# Patient Record
Sex: FEMALE | Race: WHITE | URBAN - METROPOLITAN AREA
[De-identification: names, ages, dates, MRNs, and addresses within clinical notes are randomized per-mention and may not be internally consistent; named-entity substitution may affect disease eponyms.]

---

## 2017-01-16 ENCOUNTER — OP HISTORICAL/CONVERTED ENCOUNTER (OUTPATIENT)
Dept: OTHER | Age: 82
End: 2017-01-16

## 2017-02-13 ENCOUNTER — OP HISTORICAL/CONVERTED ENCOUNTER (OUTPATIENT)
Dept: OTHER | Age: 82
End: 2017-02-13

## 2017-02-20 ENCOUNTER — OP HISTORICAL/CONVERTED ENCOUNTER (OUTPATIENT)
Dept: OTHER | Age: 82
End: 2017-02-20

## 2018-03-14 ENCOUNTER — OP HISTORICAL/CONVERTED ENCOUNTER (OUTPATIENT)
Dept: OTHER | Age: 83
End: 2018-03-14

## 2019-06-18 ENCOUNTER — ED HISTORICAL/CONVERTED ENCOUNTER (OUTPATIENT)
Dept: OTHER | Age: 84
End: 2019-06-18

## 2019-08-07 ENCOUNTER — OP HISTORICAL/CONVERTED ENCOUNTER (OUTPATIENT)
Dept: OTHER | Age: 84
End: 2019-08-07

## 2019-08-22 ENCOUNTER — OP HISTORICAL/CONVERTED ENCOUNTER (OUTPATIENT)
Dept: OTHER | Age: 84
End: 2019-08-22

## 2019-12-11 ENCOUNTER — OP HISTORICAL/CONVERTED ENCOUNTER (OUTPATIENT)
Dept: OTHER | Age: 84
End: 2019-12-11

## 2020-02-14 ENCOUNTER — OP HISTORICAL/CONVERTED ENCOUNTER (OUTPATIENT)
Dept: OTHER | Age: 85
End: 2020-02-14

## 2022-06-08 LAB
CREATININE, EXTERNAL: 1.29
HBA1C MFR BLD HPLC: 6.1 %

## 2022-11-04 ENCOUNTER — TELEPHONE (OUTPATIENT)
Dept: INTERNAL MEDICINE CLINIC | Age: 87
End: 2022-11-04

## 2022-12-01 ENCOUNTER — TELEPHONE (OUTPATIENT)
Dept: INTERNAL MEDICINE CLINIC | Age: 87
End: 2022-12-01

## 2022-12-01 DIAGNOSIS — R82.90 ABNORMAL URINALYSIS: ICD-10-CM

## 2022-12-01 DIAGNOSIS — R73.01 ELEVATED FASTING BLOOD SUGAR: ICD-10-CM

## 2022-12-01 DIAGNOSIS — E78.00 HYPERCHOLESTEREMIA: Primary | ICD-10-CM

## 2022-12-01 NOTE — TELEPHONE ENCOUNTER
12/1/22 called pt lmvm cell to call back to r/s 12/12/22 2pm appt due to provider out of office. Also called home number s/w son-in-law he will have his wife call to r/s.

## 2022-12-02 RX ORDER — VERAPAMIL HYDROCHLORIDE 120 MG/1
120 TABLET, FILM COATED ORAL 2 TIMES DAILY
Qty: 180 TABLET | Refills: 1 | Status: SHIPPED | OUTPATIENT
Start: 2022-12-02

## 2022-12-02 RX ORDER — VERAPAMIL HYDROCHLORIDE 120 MG/1
120 TABLET, FILM COATED ORAL 2 TIMES DAILY
COMMUNITY
End: 2022-12-02 | Stop reason: SDUPTHER

## 2022-12-28 PROBLEM — R73.01 ELEVATED FASTING BLOOD SUGAR: Status: ACTIVE | Noted: 2022-12-28

## 2022-12-28 PROBLEM — I10 BENIGN ESSENTIAL HTN: Status: ACTIVE | Noted: 2022-12-28

## 2022-12-28 PROBLEM — J44.9 COPD, MODERATE (HCC): Status: ACTIVE | Noted: 2022-12-28

## 2022-12-28 PROBLEM — E83.52 HYPERCALCEMIA: Status: ACTIVE | Noted: 2022-12-28

## 2022-12-28 PROBLEM — F32.A DEPRESSION, CONTROLLED: Status: ACTIVE | Noted: 2022-12-28

## 2022-12-28 PROBLEM — H91.93 BILATERAL HEARING LOSS, UNSPECIFIED HEARING LOSS TYPE: Status: ACTIVE | Noted: 2022-12-28

## 2022-12-28 PROBLEM — R79.89 ELEVATED SERUM CREATININE: Status: ACTIVE | Noted: 2022-12-28

## 2022-12-28 PROBLEM — E78.00 HYPERCHOLESTEREMIA: Status: ACTIVE | Noted: 2022-12-28

## 2023-01-03 ENCOUNTER — OFFICE VISIT (OUTPATIENT)
Dept: INTERNAL MEDICINE CLINIC | Age: 88
End: 2023-01-03
Payer: MEDICARE

## 2023-01-03 VITALS
HEIGHT: 61 IN | DIASTOLIC BLOOD PRESSURE: 60 MMHG | BODY MASS INDEX: 29.07 KG/M2 | OXYGEN SATURATION: 95 % | TEMPERATURE: 97 F | SYSTOLIC BLOOD PRESSURE: 114 MMHG | WEIGHT: 154 LBS | HEART RATE: 68 BPM | RESPIRATION RATE: 18 BRPM

## 2023-01-03 DIAGNOSIS — R73.01 ELEVATED FASTING BLOOD SUGAR: ICD-10-CM

## 2023-01-03 DIAGNOSIS — I10 BENIGN ESSENTIAL HTN: Primary | ICD-10-CM

## 2023-01-03 DIAGNOSIS — R20.0 NUMBNESS OF RIGHT HAND: ICD-10-CM

## 2023-01-03 DIAGNOSIS — F32.A DEPRESSION, CONTROLLED: ICD-10-CM

## 2023-01-03 DIAGNOSIS — R79.89 ELEVATED SERUM CREATININE: ICD-10-CM

## 2023-01-03 DIAGNOSIS — E78.00 HYPERCHOLESTEREMIA: ICD-10-CM

## 2023-01-03 DIAGNOSIS — J44.9 COPD, MODERATE (HCC): ICD-10-CM

## 2023-01-03 PROBLEM — N18.30 CHRONIC RENAL DISEASE, STAGE III (HCC): Status: ACTIVE | Noted: 2023-01-03

## 2023-01-03 PROCEDURE — 1123F ACP DISCUSS/DSCN MKR DOCD: CPT | Performed by: INTERNAL MEDICINE

## 2023-01-03 PROCEDURE — 99214 OFFICE O/P EST MOD 30 MIN: CPT | Performed by: INTERNAL MEDICINE

## 2023-01-03 RX ORDER — GABAPENTIN 100 MG/1
100 CAPSULE ORAL
Qty: 30 CAPSULE | Refills: 1 | Status: SHIPPED | OUTPATIENT
Start: 2023-01-03

## 2023-01-03 RX ORDER — SIMVASTATIN 10 MG/1
10 TABLET, FILM COATED ORAL
Qty: 90 TABLET | Refills: 1 | Status: SHIPPED | OUTPATIENT
Start: 2023-01-03

## 2023-01-03 RX ORDER — GABAPENTIN 100 MG/1
100 CAPSULE ORAL 3 TIMES DAILY
Qty: 30 CAPSULE | Refills: 1 | Status: SHIPPED | OUTPATIENT
Start: 2023-01-03 | End: 2023-01-03 | Stop reason: ALTCHOICE

## 2023-01-03 NOTE — PROGRESS NOTES
1. Have you been to the ER, urgent care clinic since your last visit? Hospitalized since your last visit? No    2. Have you seen or consulted any other health care providers outside of the [address] [place] [address] since your last visit? Include any pap smears or colon screening. No    [address] [facility]  [address]  [place], [address]  Phone: [phone]      Arlinda Mcburney (: 1932) is a 80 y.o. female, established patient, here for evaluation of the following chief complaint(s):  Follow-up, Hypertension, COPD, and Cholesterol Problem         SUBJECTIVE/OBJECTIVE:  HPI:  Pt states that she is here for a follow up on HTN, COPD, and Cholesterol. She has an appointment in 2023 with her pulmonologist, Dr Denton Client for a follow up. She has an appointment to see her eye doctor in May 23. Pt denies any pain at this time. She needs a refill on Simvastatin. Patient is with her daughter David Jeffrey and she stated mom got better from [facility] with the medications in 2022. Cindi informed mom takes Linzess  2 times a week and it helps with her constipation. Stated she has some numbness in her right hand and has to shake her hand very often. Prior to Admission medications    Medication Sig Start Date End Date Taking? Authorizing Provider   gabapentin (NEURONTIN) 100 mg capsule Take 1 Capsule by mouth nightly. Max Daily Amount: 100 mg. 1/3/23  Yes Selena Crain MD   simvastatin (ZOCOR) 10 mg tablet Take 1 Tablet by mouth nightly. 1/3/23  Yes Selena Crain MD   albuterol-ipratropium (DUO-NEB) 2.5 mg-0.5 mg/3 ml nebu 3 mL by Nebulization route every six (6) hours as needed for Wheezing. Yes Provider, Historical   clotrimazole-betamethasone (LOTRISONE) topical cream Apply 1 Each to affected area as needed for Skin Irritation. Yes Provider, Historical   mirtazapine (REMERON) 7.5 mg tablet Take 7.5 mg by mouth nightly.    Yes Provider, Historical linaCLOtide (LINZESS) 72 mcg cap capsule Take 72 mcg by mouth daily as needed. Yes Provider, Historical   albuterol (PROVENTIL HFA, VENTOLIN HFA, PROAIR HFA) 90 mcg/actuation inhaler Take 2 Puffs by inhalation as needed for Wheezing. Yes Provider, Historical   aspirin delayed-release 81 mg tablet Take  by mouth daily. Yes Provider, Historical   cyanocobalamin 1,000 mcg tablet Take 1,000 mcg by mouth daily. Yes Provider, Historical   vit C/E/Zn/coppr/lutein/zeaxan (PRESERVISION AREDS-2 PO) Take 1 Tablet by mouth daily. Yes Provider, Historical   cranberry 500 mg capsule Take 500 mg by mouth daily. Yes Provider, Historical   TURMERIC PO Take 500 mg by mouth daily. Yes Provider, Historical   fluticasone-umeclidinium-vilanterol (Trelegy Ellipta) 100-62.5-25 mcg inhaler Take 1 Puff by inhalation daily. Yes Provider, Historical   verapamiL (CALAN) 120 mg tablet Take 1 Tablet by mouth two (2) times a day. Indications: high blood pressure 12/2/22  Yes Brenda Rosado MD        Allergies   Allergen Reactions    Penicillins Unknown (comments)        Past Medical History:   Diagnosis Date    Benign essential HTN     Bilateral hearing loss, unspecified hearing loss type     COPD, moderate (HCC)     Depression, controlled     Elevated fasting blood sugar     Elevated serum creatinine     Hypercalcemia     Hypercholesteremia         Family History   Problem Relation Age of Onset    Stroke Mother     Heart Failure Mother     Heart Failure Father         Past Surgical History:   Procedure Laterality Date    HX CHOLECYSTECTOMY  03/2016    Dr. Lorie Lion TUBAL LIGATION         Review of Systems  Constitutional:  Negative for chills and fever. HENT:  Negative for congestion, ear pain, nosebleeds, sinus pain, sore throat and tinnitus. Eyes:  Negative for redness. Respiratory:  Negative for cough and shortness of breath. Cardiovascular:  Negative for chest pain and palpitations.    Gastrointestinal: Negative for abdominal pain, diarrhea, nausea and vomiting. Endocrine: Negative for cold intolerance and polyuria. Genitourinary:  Negative for dysuria and hematuria. Musculoskeletal:  Negative for back pain and neck pain. Skin:  Negative for rash. Neurological:  Negative for dizziness and headaches. Numbness in her right hand  Psychiatric/Behavioral: Negative. /60 (BP 1 Location: Left upper arm, BP Patient Position: Sitting, BP Cuff Size: Adult)   Pulse 68   Temp 97 °F (36.1 °C) (Temporal)   Resp 18   Ht 5' 1\" (1.549 m)   Wt 154 lb (69.9 kg)   SpO2 95%   BMI 29.10 kg/m²      Physical Exam  Constitutional:       Appearance: Elderly patient with her daughter Brenda Vega. appearance. HENT:      Head: Normocephalic and atraumatic. Right Ear: External ear normal.  Is hard of hearing     Left Ear: External ear normal.      Nose: Nose normal.      Mouth/Throat:      Mouth: Mucous membranes are moist.   Eyes:      Extraocular Movements: Extraocular movements intact. Pupils: Pupils are equal, round, and reactive to light. Cardiovascular:      Rate and Rhythm: Normal rate and regular rhythm. Pulmonary:      Effort: Pulmonary effort is normal.      Breath sounds: Normal breath sounds. Abdominal:      Palpations: Abdomen is soft. Tenderness: There is no abdominal tenderness. Musculoskeletal:      Cervical back: Normal range of motion and neck supple. Right lower leg: No edema. Left lower leg: No edema. Tinel's negative, no weakness of the hand muscles  Skin:     General: Skin is warm and dry. Neurological:      General: No focal deficit present. Mental Status: She is alert and oriented to person, place, and time. Psychiatric:         Mood and Affect: Mood normal.    ASSESSMENT/PLAN:  Below is the assessment and plan developed based on review of pertinent history, physical exam, labs, studies, and medications.     1. Benign essential HTN  Comments:  Blood pressure is well controlled, advised to continue low-sodium diet and verapamil  Orders:  -     METABOLIC PANEL, COMPREHENSIVE; Future  2. Elevated fasting blood sugar  Comments:  Glucose 104, A1c 5.9, sodium 143, potassium 4.7, LFTs normal.  Advised to continue low-carb diet  Orders:  -     HEMOGLOBIN A1C WITH EAG; Future  3. Elevated serum creatinine  Comments:  Tendon is slightly high at 1.27 from previous 1.29 in June 2022, advised to continue fluid intake and to avoid Aleve, naproxen, Advil. 4. COPD, moderate ([address]  Comments:  COPD stable, advised to continue Trelegy,albuterol inhaler and nebulizer and to keep follow-up with   5. Hypercholesteremia  Comments:  Cholesterol is controlled with a total cholesterol of 181, HDL of 80, LDL of 83. Advised to continue low-fat diet and simvastatin. Orders:  -     METABOLIC PANEL, COMPREHENSIVE; Future  -     LIPID PANEL; Future  -     TSH 3RD GENERATION; Future  6. Depression, controlled  Comments:  Stable, advised to continue Remeron. 7. Numbness of right hand  Comments:  Most probably carpal tunnel, will try gabapentin, offered Ortho consult patient prefers to wait  Orders:  -     gabapentin (NEURONTIN) 100 mg capsule; Take 1 Capsule by mouth nightly. Max Daily Amount: 100 mg., Normal, Disp-30 Capsule, R-1    Return in about 6 months (around 7/3/2023), or if symptoms worsen or fail to improve. There are no Patient Instructions on file for this visit. Health Maintenance Due   Topic Date Due    Depression Monitoring  Never done    DTaP/Tdap/Td series (1 - Tdap) Never done    Shingles Vaccine (1 of 2) Never done    Bone Densitometry (Dexa) Screening  Never done    COVID-19 Vaccine (3 - Booster for Moderna series) 05/10/2021    Flu Vaccine (1) 08/01/2022    Medicare Yearly Exam  Never done        Aspects of this note may have been generated using voice recognition software. Despite editing, there may be unrecognized errors.     An electronic signature was used to authenticate this note.   -- Juanpablo Carvalho MD

## 2023-05-26 RX ORDER — MIRTAZAPINE 7.5 MG/1
7.5 TABLET, FILM COATED ORAL NIGHTLY
COMMUNITY

## 2023-05-26 RX ORDER — ALBUTEROL SULFATE 90 UG/1
2 AEROSOL, METERED RESPIRATORY (INHALATION) PRN
COMMUNITY

## 2023-05-26 RX ORDER — SIMVASTATIN 10 MG
1 TABLET ORAL NIGHTLY
COMMUNITY
Start: 2023-01-03 | End: 2023-07-14 | Stop reason: SDUPTHER

## 2023-05-26 RX ORDER — VERAPAMIL HYDROCHLORIDE 120 MG/1
120 TABLET, FILM COATED ORAL 2 TIMES DAILY
COMMUNITY
Start: 2022-12-02 | End: 2023-06-04

## 2023-05-26 RX ORDER — GABAPENTIN 100 MG/1
100 CAPSULE ORAL DAILY PRN
COMMUNITY
Start: 2023-01-03

## 2023-05-26 RX ORDER — ASPIRIN 81 MG/1
TABLET ORAL DAILY
COMMUNITY

## 2023-05-26 RX ORDER — FLUTICASONE FUROATE, UMECLIDINIUM BROMIDE AND VILANTEROL TRIFENATATE 100; 62.5; 25 UG/1; UG/1; UG/1
1 POWDER RESPIRATORY (INHALATION) DAILY
COMMUNITY

## 2023-05-26 RX ORDER — IPRATROPIUM BROMIDE AND ALBUTEROL SULFATE 2.5; .5 MG/3ML; MG/3ML
3 SOLUTION RESPIRATORY (INHALATION) EVERY 6 HOURS PRN
COMMUNITY

## 2023-05-26 RX ORDER — CLOTRIMAZOLE AND BETAMETHASONE DIPROPIONATE 10; .64 MG/G; MG/G
1 CREAM TOPICAL PRN
COMMUNITY

## 2023-06-04 RX ORDER — VERAPAMIL HYDROCHLORIDE 120 MG/1
TABLET, FILM COATED ORAL
Qty: 60 TABLET | Refills: 0 | Status: SHIPPED | OUTPATIENT
Start: 2023-06-04

## 2023-06-26 LAB
CREATININE, EXTERNAL: 1.29
HBA1C MFR BLD HPLC: 6.4 %
LDL CHOLESTEROL, EXTERNAL: 75
TOTAL CHOLESTEROL, EXTERNAL: 159

## 2023-06-27 LAB
ALBUMIN SERPL-MCNC: 4 G/DL (ref 3.5–4.6)
ALBUMIN/GLOB SERPL: 1.7 {RATIO} (ref 1.2–2.2)
ALP SERPL-CCNC: 94 IU/L (ref 44–121)
ALT SERPL-CCNC: 12 IU/L (ref 0–32)
AST SERPL-CCNC: 15 IU/L (ref 0–40)
BILIRUB SERPL-MCNC: 0.4 MG/DL (ref 0–1.2)
BUN SERPL-MCNC: 22 MG/DL (ref 10–36)
BUN/CREAT SERPL: 17 (ref 12–28)
CALCIUM SERPL-MCNC: 10.3 MG/DL (ref 8.7–10.3)
CHLORIDE SERPL-SCNC: 102 MMOL/L (ref 96–106)
CHOLEST SERPL-MCNC: 159 MG/DL (ref 100–199)
CO2 SERPL-SCNC: 25 MMOL/L (ref 20–29)
CREAT SERPL-MCNC: 1.29 MG/DL (ref 0.57–1)
EGFRCR SERPLBLD CKD-EPI 2021: 39 ML/MIN/1.73
EST. AVERAGE GLUCOSE BLD GHB EST-MCNC: 137 MG/DL
GLOBULIN SER CALC-MCNC: 2.3 G/DL (ref 1.5–4.5)
GLUCOSE SERPL-MCNC: 93 MG/DL (ref 70–99)
HBA1C MFR BLD: 6.4 % (ref 4.8–5.6)
HDLC SERPL-MCNC: 64 MG/DL
LDLC SERPL CALC-MCNC: 75 MG/DL (ref 0–99)
POTASSIUM SERPL-SCNC: 4.8 MMOL/L (ref 3.5–5.2)
PROT SERPL-MCNC: 6.3 G/DL (ref 6–8.5)
SODIUM SERPL-SCNC: 142 MMOL/L (ref 134–144)
TRIGL SERPL-MCNC: 114 MG/DL (ref 0–149)
TSH SERPL DL<=0.005 MIU/L-ACNC: 3.06 UIU/ML (ref 0.45–4.5)
VLDLC SERPL CALC-MCNC: 20 MG/DL (ref 5–40)

## 2023-07-05 ENCOUNTER — OFFICE VISIT (OUTPATIENT)
Facility: CLINIC | Age: 88
End: 2023-07-05
Payer: MEDICARE

## 2023-07-05 VITALS
DIASTOLIC BLOOD PRESSURE: 70 MMHG | HEIGHT: 62 IN | WEIGHT: 148.8 LBS | BODY MASS INDEX: 27.38 KG/M2 | SYSTOLIC BLOOD PRESSURE: 156 MMHG | TEMPERATURE: 97.8 F | HEART RATE: 97 BPM

## 2023-07-05 DIAGNOSIS — E78.00 HYPERCHOLESTEROLEMIA: ICD-10-CM

## 2023-07-05 DIAGNOSIS — R73.01 ELEVATED FASTING BLOOD SUGAR: ICD-10-CM

## 2023-07-05 DIAGNOSIS — J44.9 COPD, MODERATE (HCC): ICD-10-CM

## 2023-07-05 DIAGNOSIS — I10 ESSENTIAL HYPERTENSION, BENIGN: Primary | ICD-10-CM

## 2023-07-05 DIAGNOSIS — N18.32 STAGE 3B CHRONIC KIDNEY DISEASE (HCC): ICD-10-CM

## 2023-07-05 PROCEDURE — 1123F ACP DISCUSS/DSCN MKR DOCD: CPT | Performed by: INTERNAL MEDICINE

## 2023-07-05 PROCEDURE — 3023F SPIROM DOC REV: CPT | Performed by: INTERNAL MEDICINE

## 2023-07-05 PROCEDURE — 99214 OFFICE O/P EST MOD 30 MIN: CPT | Performed by: INTERNAL MEDICINE

## 2023-07-05 PROCEDURE — G8419 CALC BMI OUT NRM PARAM NOF/U: HCPCS | Performed by: INTERNAL MEDICINE

## 2023-07-05 PROCEDURE — 1090F PRES/ABSN URINE INCON ASSESS: CPT | Performed by: INTERNAL MEDICINE

## 2023-07-05 PROCEDURE — 1036F TOBACCO NON-USER: CPT | Performed by: INTERNAL MEDICINE

## 2023-07-05 PROCEDURE — G8427 DOCREV CUR MEDS BY ELIG CLIN: HCPCS | Performed by: INTERNAL MEDICINE

## 2023-07-05 RX ORDER — ZOSTER VACCINE RECOMBINANT, ADJUVANTED 50 MCG/0.5
0.5 KIT INTRAMUSCULAR SEE ADMIN INSTRUCTIONS
Qty: 0.5 ML | Refills: 0 | Status: SHIPPED | OUTPATIENT
Start: 2023-07-05 | End: 2024-01-01

## 2023-07-05 SDOH — ECONOMIC STABILITY: FOOD INSECURITY: WITHIN THE PAST 12 MONTHS, YOU WORRIED THAT YOUR FOOD WOULD RUN OUT BEFORE YOU GOT MONEY TO BUY MORE.: NEVER TRUE

## 2023-07-05 SDOH — ECONOMIC STABILITY: INCOME INSECURITY: HOW HARD IS IT FOR YOU TO PAY FOR THE VERY BASICS LIKE FOOD, HOUSING, MEDICAL CARE, AND HEATING?: NOT HARD AT ALL

## 2023-07-05 SDOH — ECONOMIC STABILITY: HOUSING INSECURITY
IN THE LAST 12 MONTHS, WAS THERE A TIME WHEN YOU DID NOT HAVE A STEADY PLACE TO SLEEP OR SLEPT IN A SHELTER (INCLUDING NOW)?: NO

## 2023-07-05 SDOH — ECONOMIC STABILITY: FOOD INSECURITY: WITHIN THE PAST 12 MONTHS, THE FOOD YOU BOUGHT JUST DIDN'T LAST AND YOU DIDN'T HAVE MONEY TO GET MORE.: NEVER TRUE

## 2023-07-05 NOTE — PROGRESS NOTES
[address] [facility]  [address] [facility], [address] [facility]  Phone: [phone]      Kandace Colmenares (: 1932) is a 80 y.o. female, established patient, here for evaluation of the following chief complaint(s):  Follow-up, Hypertension, and Discuss Labs      SUBJECTIVE/OBJECTIVE:  HPI:  Patient is being seen today for a 6 month follow up and she recently had lab work done in . Glu: 93, BUN: 22, Creatin: 1.29, Sod: 142, Potassium: 4.8, Alk: 94, AST:15, ALT: 12, Chol, total: 159, HDL: 64, HDL: 64, LDL: 75, Hemo A1C: 6.4. She is here today with her daughter Em Ospina. Patient has  history of hypertension and COPD. She states that she does check BP at home occasionally and does not have any high BP readings. Patient states that she gets a little light headed sometimes but it does not last long. She states that overall she feels okay and appetite is low and worries about weight. She states that her energy level is low. Has tingling and numbness in her hands at times,especially after she uses her Tablet for a while. Patient does not need any refills at this time.   Informed mom had follow-up with  and advised to continue current medications  Current Outpatient Medications   Medication Sig    zoster recombinant adjuvanted vaccine (SHINGRIX) 50 MCG/0.5ML SUSR injection Inject 0.5 mLs into the muscle See Admin Instructions 1 dose now and repeat in 2-6 months    verapamil (CALAN) 120 MG tablet TAKE 1 TABLET BY MOUTH TWICE DAILY FOR HIGH BLOOD PRESSURE    TURMERIC PO Take 500 mg by mouth daily    albuterol sulfate HFA (PROVENTIL;VENTOLIN;PROAIR) 108 (90 Base) MCG/ACT inhaler Inhale 2 puffs into the lungs as needed    aspirin 81 MG EC tablet Take by mouth daily    clotrimazole-betamethasone (LOTRISONE) 1-0.05 % cream Apply 1 each topically as needed    cyanocobalamin 1000 MCG tablet Take 1 tablet by mouth daily    fluticasone-umeclidin-vilant (TRELEGY ELLIPTA)

## 2023-07-05 NOTE — PROGRESS NOTES
1. \"Have you been to the ER, urgent care clinic since your last visit? Hospitalized since your last visit? \" No    2. \"Have you seen or consulted any other health care providers outside of the [address] [place] [address] since your last visit? \" No     3. For patients aged 43-73: Has the patient had a colonoscopy / FIT/ Cologuard? NA - based on age      If the patient is female:    4. For patients aged 43-66: Has the patient had a mammogram within the past 2 years? NA - based on age or sex      11. For patients aged 21-65: Has the patient had a pap smear?  NA - based on age or sex

## 2023-07-14 RX ORDER — VERAPAMIL HYDROCHLORIDE 120 MG/1
TABLET, FILM COATED ORAL
Qty: 180 TABLET | Refills: 1 | Status: SHIPPED | OUTPATIENT
Start: 2023-07-14

## 2023-07-14 RX ORDER — SIMVASTATIN 10 MG
10 TABLET ORAL NIGHTLY
Qty: 90 TABLET | Refills: 1 | Status: SHIPPED | OUTPATIENT
Start: 2023-07-14

## 2023-09-16 RX ORDER — IPRATROPIUM BROMIDE AND ALBUTEROL SULFATE 2.5; .5 MG/3ML; MG/3ML
SOLUTION RESPIRATORY (INHALATION)
Qty: 180 ML | Refills: 0 | Status: SHIPPED | OUTPATIENT
Start: 2023-09-16

## 2023-10-09 ENCOUNTER — APPOINTMENT (OUTPATIENT)
Facility: HOSPITAL | Age: 88
End: 2023-10-09
Payer: MEDICARE

## 2023-10-09 ENCOUNTER — TELEPHONE (OUTPATIENT)
Facility: CLINIC | Age: 88
End: 2023-10-09

## 2023-10-09 ENCOUNTER — HOSPITAL ENCOUNTER (EMERGENCY)
Facility: HOSPITAL | Age: 88
Discharge: HOME | End: 2023-10-09
Attending: EMERGENCY MEDICINE
Payer: MEDICARE

## 2023-10-09 VITALS
TEMPERATURE: 97.6 F | DIASTOLIC BLOOD PRESSURE: 76 MMHG | HEART RATE: 76 BPM | BODY MASS INDEX: 26.5 KG/M2 | RESPIRATION RATE: 24 BRPM | HEIGHT: 62 IN | SYSTOLIC BLOOD PRESSURE: 193 MMHG | WEIGHT: 144 LBS | OXYGEN SATURATION: 95 %

## 2023-10-09 DIAGNOSIS — J44.1 COPD EXACERBATION (HCC): Primary | ICD-10-CM

## 2023-10-09 LAB
ALBUMIN SERPL-MCNC: 3.1 G/DL (ref 3.5–5)
ALBUMIN/GLOB SERPL: 0.8 (ref 1.1–2.2)
ALP SERPL-CCNC: 108 U/L (ref 45–117)
ALT SERPL-CCNC: 17 U/L (ref 12–78)
ANION GAP SERPL CALC-SCNC: 7 MMOL/L (ref 5–15)
AST SERPL W P-5'-P-CCNC: 16 U/L (ref 15–37)
BASOPHILS # BLD: 0 K/UL (ref 0–0.1)
BASOPHILS NFR BLD: 0 % (ref 0–1)
BILIRUB SERPL-MCNC: 0.3 MG/DL (ref 0.2–1)
BNP SERPL-MCNC: 534 PG/ML
BUN SERPL-MCNC: 27 MG/DL (ref 6–20)
BUN/CREAT SERPL: 20 (ref 12–20)
CA-I BLD-MCNC: 9.7 MG/DL (ref 8.5–10.1)
CHLORIDE SERPL-SCNC: 102 MMOL/L (ref 97–108)
CO2 SERPL-SCNC: 30 MMOL/L (ref 21–32)
CREAT SERPL-MCNC: 1.36 MG/DL (ref 0.55–1.02)
DIFFERENTIAL METHOD BLD: ABNORMAL
EOSINOPHIL # BLD: 0.3 K/UL (ref 0–0.4)
EOSINOPHIL NFR BLD: 3 % (ref 0–7)
ERYTHROCYTE [DISTWIDTH] IN BLOOD BY AUTOMATED COUNT: 13 % (ref 11.5–14.5)
GLOBULIN SER CALC-MCNC: 3.9 G/DL (ref 2–4)
GLUCOSE SERPL-MCNC: 117 MG/DL (ref 65–100)
HCT VFR BLD AUTO: 33.9 % (ref 35–47)
HGB BLD-MCNC: 10.6 G/DL (ref 11.5–16)
IMM GRANULOCYTES # BLD AUTO: 0 K/UL (ref 0–0.04)
IMM GRANULOCYTES NFR BLD AUTO: 0 % (ref 0–0.5)
LYMPHOCYTES # BLD: 1.4 K/UL (ref 0.8–3.5)
LYMPHOCYTES NFR BLD: 12 % (ref 12–49)
MCH RBC QN AUTO: 29.4 PG (ref 26–34)
MCHC RBC AUTO-ENTMCNC: 31.3 G/DL (ref 30–36.5)
MCV RBC AUTO: 93.9 FL (ref 80–99)
MONOCYTES # BLD: 1 K/UL (ref 0–1)
MONOCYTES NFR BLD: 9 % (ref 5–13)
NEUTS SEG # BLD: 9 K/UL (ref 1.8–8)
NEUTS SEG NFR BLD: 76 % (ref 32–75)
PLATELET # BLD AUTO: 270 K/UL (ref 150–400)
PMV BLD AUTO: 10.9 FL (ref 8.9–12.9)
POTASSIUM SERPL-SCNC: 4.7 MMOL/L (ref 3.5–5.1)
PROT SERPL-MCNC: 7 G/DL (ref 6.4–8.2)
RBC # BLD AUTO: 3.61 M/UL (ref 3.8–5.2)
SODIUM SERPL-SCNC: 139 MMOL/L (ref 136–145)
TROPONIN I SERPL HS-MCNC: 10 NG/L (ref 0–51)
WBC # BLD AUTO: 11.7 K/UL (ref 3.6–11)

## 2023-10-09 PROCEDURE — 96366 THER/PROPH/DIAG IV INF ADDON: CPT

## 2023-10-09 PROCEDURE — 93005 ELECTROCARDIOGRAM TRACING: CPT | Performed by: EMERGENCY MEDICINE

## 2023-10-09 PROCEDURE — 71045 X-RAY EXAM CHEST 1 VIEW: CPT

## 2023-10-09 PROCEDURE — 80053 COMPREHEN METABOLIC PANEL: CPT

## 2023-10-09 PROCEDURE — 96365 THER/PROPH/DIAG IV INF INIT: CPT

## 2023-10-09 PROCEDURE — 2580000003 HC RX 258: Performed by: EMERGENCY MEDICINE

## 2023-10-09 PROCEDURE — 84484 ASSAY OF TROPONIN QUANT: CPT

## 2023-10-09 PROCEDURE — 85025 COMPLETE CBC W/AUTO DIFF WBC: CPT

## 2023-10-09 PROCEDURE — 6360000002 HC RX W HCPCS: Performed by: EMERGENCY MEDICINE

## 2023-10-09 PROCEDURE — 6360000002 HC RX W HCPCS

## 2023-10-09 PROCEDURE — 99285 EMERGENCY DEPT VISIT HI MDM: CPT

## 2023-10-09 PROCEDURE — 96375 TX/PRO/DX INJ NEW DRUG ADDON: CPT

## 2023-10-09 PROCEDURE — 83880 ASSAY OF NATRIURETIC PEPTIDE: CPT

## 2023-10-09 PROCEDURE — 6370000000 HC RX 637 (ALT 250 FOR IP): Performed by: EMERGENCY MEDICINE

## 2023-10-09 RX ORDER — IPRATROPIUM BROMIDE AND ALBUTEROL SULFATE 2.5; .5 MG/3ML; MG/3ML
1 SOLUTION RESPIRATORY (INHALATION)
Status: COMPLETED | OUTPATIENT
Start: 2023-10-09 | End: 2023-10-09

## 2023-10-09 RX ORDER — IPRATROPIUM BROMIDE AND ALBUTEROL SULFATE 2.5; .5 MG/3ML; MG/3ML
1 SOLUTION RESPIRATORY (INHALATION) EVERY 6 HOURS PRN
Qty: 60 ML | Refills: 0 | Status: SHIPPED | OUTPATIENT
Start: 2023-10-09 | End: 2023-10-14

## 2023-10-09 RX ORDER — AZITHROMYCIN 250 MG/1
TABLET, FILM COATED ORAL
Qty: 1 PACKET | Refills: 0 | Status: SHIPPED | OUTPATIENT
Start: 2023-10-09

## 2023-10-09 RX ORDER — PREDNISONE 50 MG/1
50 TABLET ORAL DAILY
Qty: 5 TABLET | Refills: 0 | Status: SHIPPED | OUTPATIENT
Start: 2023-10-09 | End: 2023-10-14

## 2023-10-09 RX ADMIN — IPRATROPIUM BROMIDE AND ALBUTEROL SULFATE 1 DOSE: 2.5; .5 SOLUTION RESPIRATORY (INHALATION) at 18:05

## 2023-10-09 RX ADMIN — IPRATROPIUM BROMIDE AND ALBUTEROL SULFATE 1 DOSE: .5; 2.5 SOLUTION RESPIRATORY (INHALATION) at 17:11

## 2023-10-09 RX ADMIN — IPRATROPIUM BROMIDE AND ALBUTEROL SULFATE 1 DOSE: 2.5; .5 SOLUTION RESPIRATORY (INHALATION) at 16:47

## 2023-10-09 RX ADMIN — AZITHROMYCIN MONOHYDRATE 500 MG: 500 INJECTION, POWDER, LYOPHILIZED, FOR SOLUTION INTRAVENOUS at 18:07

## 2023-10-09 RX ADMIN — METHYLPREDNISOLONE SODIUM SUCCINATE 125 MG: 125 INJECTION, POWDER, FOR SOLUTION INTRAMUSCULAR; INTRAVENOUS at 16:52

## 2023-10-09 ASSESSMENT — PAIN - FUNCTIONAL ASSESSMENT: PAIN_FUNCTIONAL_ASSESSMENT: 0-10

## 2023-10-09 ASSESSMENT — PAIN SCALES - GENERAL: PAINLEVEL_OUTOF10: 4

## 2023-10-09 ASSESSMENT — PAIN DESCRIPTION - LOCATION: LOCATION: GENERALIZED

## 2023-10-09 NOTE — DISCHARGE INSTRUCTIONS
Thank you! Thank you for allowing me to care for you in the emergency department. It is my goal to provide you with excellent care. If you have not received excellent quality care, please ask to speak to the nurse manager. Please fill out the survey that will come to you by mail or email since we listen to your feedback! Below you will find a list of your tests from today's visit. Should you have any questions, please do not hesitate to call the emergency department.     Labs  Recent Results (from the past 12 hour(s))   Troponin    Collection Time: 10/09/23  4:30 PM   Result Value Ref Range    Troponin, High Sensitivity 10 0 - 51 ng/L   CBC with Auto Differential    Collection Time: 10/09/23  4:32 PM   Result Value Ref Range    WBC 11.7 (H) 3.6 - 11.0 K/uL    RBC 3.61 (L) 3.80 - 5.20 M/uL    Hemoglobin 10.6 (L) 11.5 - 16.0 g/dL    Hematocrit 33.9 (L) 35.0 - 47.0 %    MCV 93.9 80.0 - 99.0 FL    MCH 29.4 26.0 - 34.0 PG    MCHC 31.3 30.0 - 36.5 g/dL    RDW 13.0 11.5 - 14.5 %    Platelets 819 291 - 303 K/uL    MPV 10.9 8.9 - 12.9 FL    Neutrophils % 76 (H) 32 - 75 %    Lymphocytes % 12 12 - 49 %    Monocytes % 9 5 - 13 %    Eosinophils % 3 0 - 7 %    Basophils % 0 0 - 1 %    Immature Granulocytes 0 0.0 - 0.5 %    Neutrophils Absolute 9.0 (H) 1.8 - 8.0 K/UL    Lymphocytes Absolute 1.4 0.8 - 3.5 K/UL    Monocytes Absolute 1.0 0.0 - 1.0 K/UL    Eosinophils Absolute 0.3 0.0 - 0.4 K/UL    Basophils Absolute 0.0 0.0 - 0.1 K/UL    Absolute Immature Granulocyte 0.0 0.00 - 0.04 K/UL    Differential Type AUTOMATED     Comprehensive Metabolic Panel    Collection Time: 10/09/23  4:32 PM   Result Value Ref Range    Sodium 139 136 - 145 mmol/L    Potassium 4.7 3.5 - 5.1 mmol/L    Chloride 102 97 - 108 mmol/L    CO2 30 21 - 32 mmol/L    Anion Gap 7 5 - 15 mmol/L    Glucose 117 (H) 65 - 100 mg/dL    BUN 27 (H) 6 - 20 mg/dL    Creatinine 1.36 (H) 0.55 - 1.02 mg/dL    Bun/Cre Ratio 20 12 - 20      Est, Glom Filt Rate 37 (L) >60

## 2023-10-09 NOTE — ED PROVIDER NOTES
were completed with voice recognition software. Quite often unanticipated grammatical, syntax, homophones, and other interpretive errors are inadvertently transcribed by the computer software. Please disregards these errors.  Please excuse any errors that have escaped final proofreading.)       Madie Jane DO  10/09/23 1918

## 2023-10-09 NOTE — TELEPHONE ENCOUNTER
Patients daughter called back and I scheduled an appointment for Wednesday but also gave Dr. Evangelist Molina instruction about taking [facility] to [facility]. The daughter understood but only held off because she didn't want to take her to [facility].

## 2023-10-09 NOTE — TELEPHONE ENCOUNTER
Patients daughter called stated she is having some breathing issues and really congested. She tried to get her in with [facility] but he doesn't have anything until November. She wanted to see if Dr. Jermaine Melgar could see her but I advised she was not in the office today. Daughter was concerned and stated she was going to take her to be seen and would let us know where and what they find so Dr. Jermaine Melgar is aware.

## 2023-10-10 RX ORDER — IPRATROPIUM BROMIDE AND ALBUTEROL SULFATE 2.5; .5 MG/3ML; MG/3ML
SOLUTION RESPIRATORY (INHALATION)
Qty: 180 ML | Refills: 0 | OUTPATIENT
Start: 2023-10-10

## 2023-10-11 LAB
EKG ATRIAL RATE: 73 BPM
EKG DIAGNOSIS: NORMAL
EKG P AXIS: 24 DEGREES
EKG P-R INTERVAL: 108 MS
EKG Q-T INTERVAL: 408 MS
EKG QRS DURATION: 94 MS
EKG QTC CALCULATION (BAZETT): 449 MS
EKG R AXIS: 66 DEGREES
EKG T AXIS: 82 DEGREES
EKG VENTRICULAR RATE: 73 BPM

## 2023-10-19 ENCOUNTER — OFFICE VISIT (OUTPATIENT)
Facility: CLINIC | Age: 88
End: 2023-10-19
Payer: MEDICARE

## 2023-10-19 VITALS
OXYGEN SATURATION: 94 % | TEMPERATURE: 97 F | SYSTOLIC BLOOD PRESSURE: 142 MMHG | BODY MASS INDEX: 26.81 KG/M2 | WEIGHT: 142 LBS | HEART RATE: 102 BPM | DIASTOLIC BLOOD PRESSURE: 70 MMHG | HEIGHT: 61 IN

## 2023-10-19 DIAGNOSIS — N18.32 STAGE 3B CHRONIC KIDNEY DISEASE (HCC): ICD-10-CM

## 2023-10-19 DIAGNOSIS — D64.9 MILD ANEMIA: ICD-10-CM

## 2023-10-19 DIAGNOSIS — J44.1 COPD EXACERBATION (HCC): Primary | ICD-10-CM

## 2023-10-19 PROCEDURE — 1090F PRES/ABSN URINE INCON ASSESS: CPT | Performed by: INTERNAL MEDICINE

## 2023-10-19 PROCEDURE — 3023F SPIROM DOC REV: CPT | Performed by: INTERNAL MEDICINE

## 2023-10-19 PROCEDURE — G8419 CALC BMI OUT NRM PARAM NOF/U: HCPCS | Performed by: INTERNAL MEDICINE

## 2023-10-19 PROCEDURE — G8484 FLU IMMUNIZE NO ADMIN: HCPCS | Performed by: INTERNAL MEDICINE

## 2023-10-19 PROCEDURE — 1036F TOBACCO NON-USER: CPT | Performed by: INTERNAL MEDICINE

## 2023-10-19 PROCEDURE — 99213 OFFICE O/P EST LOW 20 MIN: CPT | Performed by: INTERNAL MEDICINE

## 2023-10-19 PROCEDURE — G8427 DOCREV CUR MEDS BY ELIG CLIN: HCPCS | Performed by: INTERNAL MEDICINE

## 2023-10-19 PROCEDURE — 1123F ACP DISCUSS/DSCN MKR DOCD: CPT | Performed by: INTERNAL MEDICINE

## 2023-10-19 RX ORDER — METHYLPREDNISOLONE 4 MG/1
TABLET ORAL
Qty: 1 KIT | Refills: 0 | Status: SHIPPED | OUTPATIENT
Start: 2023-10-19 | End: 2023-10-25

## 2023-10-19 RX ORDER — DOXYCYCLINE HYCLATE 100 MG
100 TABLET ORAL 2 TIMES DAILY
Qty: 20 TABLET | Refills: 0 | Status: SHIPPED | OUTPATIENT
Start: 2023-10-19 | End: 2023-10-29

## 2023-10-19 RX ORDER — OMEPRAZOLE 20 MG/1
20 CAPSULE, DELAYED RELEASE ORAL DAILY
COMMUNITY

## 2023-10-19 SDOH — ECONOMIC STABILITY: INCOME INSECURITY: HOW HARD IS IT FOR YOU TO PAY FOR THE VERY BASICS LIKE FOOD, HOUSING, MEDICAL CARE, AND HEATING?: NOT HARD AT ALL

## 2023-10-19 SDOH — ECONOMIC STABILITY: FOOD INSECURITY: WITHIN THE PAST 12 MONTHS, THE FOOD YOU BOUGHT JUST DIDN'T LAST AND YOU DIDN'T HAVE MONEY TO GET MORE.: NEVER TRUE

## 2023-10-19 SDOH — ECONOMIC STABILITY: FOOD INSECURITY: WITHIN THE PAST 12 MONTHS, YOU WORRIED THAT YOUR FOOD WOULD RUN OUT BEFORE YOU GOT MONEY TO BUY MORE.: NEVER TRUE

## 2023-10-19 NOTE — PROGRESS NOTES
No chief complaint on file. 1. \"Have you been to the ER, urgent care clinic since your last visit? Hospitalized since your last visit? \"  [facility]    2. \"Have you seen or consulted any other health care providers outside of the [address] [place] [address] since your last visit? \" No     3. For patients aged 43-73: Has the patient had a colonoscopy / FIT/ Cologuard? NA - based on age      If the patient is female:    4. For patients aged 43-66: Has the patient had a mammogram within the past 2 years? NA - based on age or sex      11. For patients aged 21-65: Has the patient had a pap smear?  NA - based on age or sex
hospital if she gets more short of breath  2. Stage 3b chronic kidney disease ([address])  Comments:  Creatinine slightly high at 1.36, advised to avoid Aleve, Motrin and will monitor  3. Mild anemia  Comments:  Hemoglobin 10, hematocrit 33, will monitor      No follow-ups on file. There are no Patient Instructions on file for this visit. Health Maintenance Due   Topic Date Due    DTaP/Tdap/Td vaccine (1 - Tdap) Never done    Shingles vaccine (1 of 2) Never done    Pneumococcal 65+ years Vaccine (2 - PCV) 10/07/2015    COVID-19 Vaccine (3 - Migdalia Sill series) 05/10/2021    Annual Wellness Visit (AWV)  Never done    Flu vaccine (1) 08/01/2023        Aspects of this note may have been generated using voice recognition software. Despite editing, there may be unrecognized errors. An electronic signature was used to authenticate this note.   -- Rodo Vogt MD

## 2023-11-14 RX ORDER — IPRATROPIUM BROMIDE AND ALBUTEROL SULFATE 2.5; .5 MG/3ML; MG/3ML
SOLUTION RESPIRATORY (INHALATION)
Qty: 180 ML | Refills: 0 | Status: SHIPPED | OUTPATIENT
Start: 2023-11-14

## 2023-12-30 LAB
ALBUMIN SERPL-MCNC: 3.8 G/DL (ref 3.6–4.6)
ALBUMIN/GLOB SERPL: 1.7 {RATIO} (ref 1.2–2.2)
ALP SERPL-CCNC: 91 IU/L (ref 44–121)
ALT SERPL-CCNC: 7 IU/L (ref 0–32)
AST SERPL-CCNC: 10 IU/L (ref 0–40)
BASOPHILS # BLD AUTO: 0.1 X10E3/UL (ref 0–0.2)
BASOPHILS NFR BLD AUTO: 1 %
BILIRUB SERPL-MCNC: 0.2 MG/DL (ref 0–1.2)
BUN SERPL-MCNC: 26 MG/DL (ref 10–36)
BUN/CREAT SERPL: 25 (ref 12–28)
CALCIUM SERPL-MCNC: 10.3 MG/DL (ref 8.7–10.3)
CHLORIDE SERPL-SCNC: 104 MMOL/L (ref 96–106)
CHOLEST SERPL-MCNC: 160 MG/DL (ref 100–199)
CO2 SERPL-SCNC: 28 MMOL/L (ref 20–29)
CREAT SERPL-MCNC: 1.05 MG/DL (ref 0.57–1)
EGFRCR SERPLBLD CKD-EPI 2021: 50 ML/MIN/1.73
EOSINOPHIL # BLD AUTO: 0.3 X10E3/UL (ref 0–0.4)
EOSINOPHIL NFR BLD AUTO: 3 %
ERYTHROCYTE [DISTWIDTH] IN BLOOD BY AUTOMATED COUNT: 13.4 % (ref 11.7–15.4)
GLOBULIN SER CALC-MCNC: 2.2 G/DL (ref 1.5–4.5)
GLUCOSE SERPL-MCNC: 96 MG/DL (ref 70–99)
HBA1C MFR BLD: 6.1 % (ref 4.8–5.6)
HCT VFR BLD AUTO: 31.7 % (ref 34–46.6)
HDLC SERPL-MCNC: 71 MG/DL
HGB BLD-MCNC: 10.1 G/DL (ref 11.1–15.9)
IMM GRANULOCYTES # BLD AUTO: 0 X10E3/UL (ref 0–0.1)
IMM GRANULOCYTES NFR BLD AUTO: 0 %
LDLC SERPL CALC-MCNC: 75 MG/DL (ref 0–99)
LYMPHOCYTES # BLD AUTO: 1.7 X10E3/UL (ref 0.7–3.1)
LYMPHOCYTES NFR BLD AUTO: 17 %
MCH RBC QN AUTO: 27.4 PG (ref 26.6–33)
MCHC RBC AUTO-ENTMCNC: 31.9 G/DL (ref 31.5–35.7)
MCV RBC AUTO: 86 FL (ref 79–97)
MONOCYTES # BLD AUTO: 0.8 X10E3/UL (ref 0.1–0.9)
MONOCYTES NFR BLD AUTO: 8 %
NEUTROPHILS # BLD AUTO: 7.2 X10E3/UL (ref 1.4–7)
NEUTROPHILS NFR BLD AUTO: 71 %
PLATELET # BLD AUTO: 364 X10E3/UL (ref 150–450)
POTASSIUM SERPL-SCNC: 5 MMOL/L (ref 3.5–5.2)
PROT SERPL-MCNC: 6 G/DL (ref 6–8.5)
RBC # BLD AUTO: 3.68 X10E6/UL (ref 3.77–5.28)
SODIUM SERPL-SCNC: 143 MMOL/L (ref 134–144)
TRIGL SERPL-MCNC: 73 MG/DL (ref 0–149)
VLDLC SERPL CALC-MCNC: 14 MG/DL (ref 5–40)
WBC # BLD AUTO: 10.2 X10E3/UL (ref 3.4–10.8)

## 2024-01-04 ENCOUNTER — OFFICE VISIT (OUTPATIENT)
Facility: CLINIC | Age: 89
End: 2024-01-04
Payer: MEDICARE

## 2024-01-04 VITALS
HEART RATE: 67 BPM | HEIGHT: 61 IN | DIASTOLIC BLOOD PRESSURE: 58 MMHG | SYSTOLIC BLOOD PRESSURE: 130 MMHG | BODY MASS INDEX: 25.86 KG/M2 | WEIGHT: 137 LBS | TEMPERATURE: 97.1 F | OXYGEN SATURATION: 89 %

## 2024-01-04 DIAGNOSIS — R63.4 WEIGHT LOSS: ICD-10-CM

## 2024-01-04 DIAGNOSIS — R73.09 ELEVATED HEMOGLOBIN A1C: ICD-10-CM

## 2024-01-04 DIAGNOSIS — D64.9 MILD ANEMIA: ICD-10-CM

## 2024-01-04 DIAGNOSIS — N18.31 STAGE 3A CHRONIC KIDNEY DISEASE (HCC): ICD-10-CM

## 2024-01-04 DIAGNOSIS — E78.00 HYPERCHOLESTEROLEMIA: ICD-10-CM

## 2024-01-04 DIAGNOSIS — J44.9 COPD, MODERATE (HCC): ICD-10-CM

## 2024-01-04 DIAGNOSIS — Z00.00 MEDICARE ANNUAL WELLNESS VISIT, SUBSEQUENT: Primary | ICD-10-CM

## 2024-01-04 PROCEDURE — G8427 DOCREV CUR MEDS BY ELIG CLIN: HCPCS | Performed by: INTERNAL MEDICINE

## 2024-01-04 PROCEDURE — 99214 OFFICE O/P EST MOD 30 MIN: CPT | Performed by: INTERNAL MEDICINE

## 2024-01-04 PROCEDURE — 1090F PRES/ABSN URINE INCON ASSESS: CPT | Performed by: INTERNAL MEDICINE

## 2024-01-04 PROCEDURE — G8419 CALC BMI OUT NRM PARAM NOF/U: HCPCS | Performed by: INTERNAL MEDICINE

## 2024-01-04 PROCEDURE — 1123F ACP DISCUSS/DSCN MKR DOCD: CPT | Performed by: INTERNAL MEDICINE

## 2024-01-04 PROCEDURE — 3023F SPIROM DOC REV: CPT | Performed by: INTERNAL MEDICINE

## 2024-01-04 PROCEDURE — 1036F TOBACCO NON-USER: CPT | Performed by: INTERNAL MEDICINE

## 2024-01-04 PROCEDURE — G8484 FLU IMMUNIZE NO ADMIN: HCPCS | Performed by: INTERNAL MEDICINE

## 2024-01-04 PROCEDURE — G0439 PPPS, SUBSEQ VISIT: HCPCS | Performed by: INTERNAL MEDICINE

## 2024-01-04 RX ORDER — CLOTRIMAZOLE AND BETAMETHASONE DIPROPIONATE 10; .64 MG/G; MG/G
1 CREAM TOPICAL PRN
Qty: 45 G | Refills: 1 | Status: SHIPPED | OUTPATIENT
Start: 2024-01-04

## 2024-01-04 RX ORDER — OMEPRAZOLE 20 MG/1
20 CAPSULE, DELAYED RELEASE ORAL DAILY
Qty: 90 CAPSULE | Refills: 1 | Status: SHIPPED | OUTPATIENT
Start: 2024-01-04

## 2024-01-04 RX ORDER — SIMVASTATIN 10 MG
10 TABLET ORAL NIGHTLY
Qty: 90 TABLET | Refills: 1 | Status: SHIPPED | OUTPATIENT
Start: 2024-01-04

## 2024-01-04 RX ORDER — IPRATROPIUM BROMIDE AND ALBUTEROL SULFATE 2.5; .5 MG/3ML; MG/3ML
SOLUTION RESPIRATORY (INHALATION)
Qty: 180 ML | Refills: 1 | Status: SHIPPED | OUTPATIENT
Start: 2024-01-04

## 2024-01-04 RX ORDER — VERAPAMIL HYDROCHLORIDE 120 MG/1
TABLET, FILM COATED ORAL
Qty: 180 TABLET | Refills: 1 | Status: SHIPPED | OUTPATIENT
Start: 2024-01-04

## 2024-01-04 ASSESSMENT — PATIENT HEALTH QUESTIONNAIRE - PHQ9
SUM OF ALL RESPONSES TO PHQ QUESTIONS 1-9: 1
SUM OF ALL RESPONSES TO PHQ9 QUESTIONS 1 & 2: 1
1. LITTLE INTEREST OR PLEASURE IN DOING THINGS: 1
9. THOUGHTS THAT YOU WOULD BE BETTER OFF DEAD, OR OF HURTING YOURSELF: 0
6. FEELING BAD ABOUT YOURSELF - OR THAT YOU ARE A FAILURE OR HAVE LET YOURSELF OR YOUR FAMILY DOWN: 0
2. FEELING DOWN, DEPRESSED OR HOPELESS: 0
4. FEELING TIRED OR HAVING LITTLE ENERGY: 0
3. TROUBLE FALLING OR STAYING ASLEEP: 0
5. POOR APPETITE OR OVEREATING: 0
7. TROUBLE CONCENTRATING ON THINGS, SUCH AS READING THE NEWSPAPER OR WATCHING TELEVISION: 0
SUM OF ALL RESPONSES TO PHQ QUESTIONS 1-9: 1
SUM OF ALL RESPONSES TO PHQ QUESTIONS 1-9: 1
10. IF YOU CHECKED OFF ANY PROBLEMS, HOW DIFFICULT HAVE THESE PROBLEMS MADE IT FOR YOU TO DO YOUR WORK, TAKE CARE OF THINGS AT HOME, OR GET ALONG WITH OTHER PEOPLE: 0
SUM OF ALL RESPONSES TO PHQ QUESTIONS 1-9: 1
8. MOVING OR SPEAKING SO SLOWLY THAT OTHER PEOPLE COULD HAVE NOTICED. OR THE OPPOSITE, BEING SO FIGETY OR RESTLESS THAT YOU HAVE BEEN MOVING AROUND A LOT MORE THAN USUAL: 0

## 2024-01-04 ASSESSMENT — LIFESTYLE VARIABLES
HOW OFTEN DO YOU HAVE A DRINK CONTAINING ALCOHOL: NEVER
HOW MANY STANDARD DRINKS CONTAINING ALCOHOL DO YOU HAVE ON A TYPICAL DAY: PATIENT DOES NOT DRINK

## 2024-01-04 NOTE — PATIENT INSTRUCTIONS
10,000 steps per day on a pedometer .  Order or download the FREE \"Exercise & Physical Activity: Your Everyday Guide\" from [employer]. Call 1-158.455.5105 or search [employer] online.  You need 4467-6702 mg of calcium and 5799-2875 IU of vitamin D per day. It is possible to meet your calcium requirement with diet alone, but a vitamin D supplement is usually necessary to meet this goal.  When exposed to the sun, use a sunscreen that protects against both UVA and UVB radiation with an SPF of 30 or greater. Reapply every 2 to 3 hours or after sweating, drying off with a towel, or swimming.  Always wear a seat belt when traveling in a car. Always wear a helmet when riding a bicycle or motorcycle.

## 2024-01-04 NOTE — PROGRESS NOTES
Chief Complaint   Patient presents with    Medicare AWV    Discuss Labs         1. \"Have you been to the ER, urgent care clinic since your last visit?  Hospitalized since your last visit?\" No    2. \"Have you seen or consulted any other health care providers outside of the [facility] since your last visit?\" No     3. For patients aged 45-75: Has the patient had a colonoscopy / FIT/ Cologuard? NA - based on age      If the patient is female:    4. For patients aged 40-74: Has the patient had a mammogram within the past 2 years? NA - based on age or sex      5. For patients aged 21-65: Has the patient had a pap smear? NA - based on age or sex

## 2024-01-04 NOTE — PROGRESS NOTES
Medicare Annual Wellness Visit    Lori Lambert is here for Medicare AWV and Discuss Labs    Assessment & Plan   Medicare annual wellness visit, subsequent  Comments:  Has living will, Aleida her daughter is the HCDM.  Patient prefers not to get any vaccines  Mild anemia  Comments:  HB 10.1,Hct 31.7,advised iron rich food,check Iron panel, advised iron rich food  Orders:  -     CBC with Auto Differential; Future  -     Iron and TIBC; Future  -     Ferritin; Future  Stage 3a chronic kidney disease (HCC)  Comments:  Creatinine is slightly better at 1.05, BUN 26, potassium 5.0, advised to keep fluid intake, avoid Aleve Motrin and try to have a BM daily  Orders:  -     Comprehensive Metabolic Panel; Future  Elevated hemoglobin A1c  Comments:  A1C better at 6.1 from previous 6.4, advised to continue low-carb diet  Orders:  -     Hemoglobin A1C; Future  Hypercholesterolemia  Comments:  Cholesterol 160, triglycerides 73, HDL 71, LDL 75, LFTs normal, advised to continue low-fat diet and simvastatin  Orders:  -     Lipid Panel; Future  -     TSH; Future  -     Comprehensive Metabolic Panel; Future  Weight loss  Comments:  TSH normal at 3 in 2023, hemoglobin 10.1 daughter informed the patient has not been eating all foods due to lack of her teeth and prefers no further workup at this time and would like to monitor.  COPD, moderate (HCC)  Comments:  Clinically stable to continue Trelegy, albuterol inhaler and nebulizer and to keep follow-up with Dr. Carrizales  Recommendations for Preventive Services Due: see orders and patient instructions/AVS.  Recommended screening schedule for the next 5-10 years is provided to the patient in written form: see Patient Instructions/AVS.     Return in about 6 months (around 7/4/2024), or if symptoms worsen or fail to improve, for follow up, labs.     Subjective   Patient is seen today for a Medicare wellness examination and follow-up of her labs done on December 12, 2023.  She is with her

## 2024-02-02 ENCOUNTER — TELEPHONE (OUTPATIENT)
Facility: CLINIC | Age: 89
End: 2024-02-02

## 2024-02-02 NOTE — TELEPHONE ENCOUNTER
Papito with [facility] called to notify that their  dropped off medication to patient but the medication left was for another person.  He states upon finding out the error, he contacted the patient and patient stated she already took one of the pills.  The medication was Januvia 100mg.  He states he advised the patient to monitor her BS and drink plenty of fluids.  He did not notice any drug interactions upon reviewing her medication list.      He advised the patient to seek medical attention if she notices anything out of the ordinary.  He states the  was spoken to and they will be making sure that this does not occur again.

## 2024-05-08 RX ORDER — OMEPRAZOLE 20 MG/1
20 CAPSULE, DELAYED RELEASE ORAL DAILY
Qty: 30 CAPSULE | OUTPATIENT
Start: 2024-05-08

## 2024-05-08 RX ORDER — OMEPRAZOLE 20 MG/1
20 CAPSULE, DELAYED RELEASE ORAL DAILY
Qty: 90 CAPSULE | Refills: 0 | Status: SHIPPED | OUTPATIENT
Start: 2024-05-08

## 2024-06-13 ENCOUNTER — OFFICE VISIT (OUTPATIENT)
Facility: CLINIC | Age: 89
End: 2024-06-13

## 2024-06-13 VITALS
OXYGEN SATURATION: 83 % | HEIGHT: 61 IN | BODY MASS INDEX: 24.73 KG/M2 | HEART RATE: 80 BPM | WEIGHT: 131 LBS | DIASTOLIC BLOOD PRESSURE: 80 MMHG | SYSTOLIC BLOOD PRESSURE: 132 MMHG

## 2024-06-13 DIAGNOSIS — J44.1 COPD EXACERBATION (HCC): Primary | ICD-10-CM

## 2024-06-13 DIAGNOSIS — K59.00 CONSTIPATION, UNSPECIFIED CONSTIPATION TYPE: ICD-10-CM

## 2024-06-13 RX ORDER — DOCUSATE SODIUM 100 MG/1
100 CAPSULE, LIQUID FILLED ORAL 2 TIMES DAILY
Qty: 60 CAPSULE | Refills: 0 | Status: SHIPPED | OUTPATIENT
Start: 2024-06-13 | End: 2024-06-14 | Stop reason: SDUPTHER

## 2024-06-13 RX ORDER — PREDNISONE 10 MG/1
TABLET ORAL
Qty: 15 TABLET | Refills: 0 | Status: SHIPPED | OUTPATIENT
Start: 2024-06-13 | End: 2024-06-14 | Stop reason: SDUPTHER

## 2024-06-13 RX ORDER — DOXYCYCLINE HYCLATE 100 MG/1
100 CAPSULE ORAL 2 TIMES DAILY
Qty: 20 CAPSULE | Refills: 0 | Status: SHIPPED | OUTPATIENT
Start: 2024-06-13 | End: 2024-06-14 | Stop reason: SDUPTHER

## 2024-06-13 ASSESSMENT — ENCOUNTER SYMPTOMS
COUGH: 1
CONSTIPATION: 1
VOMITING: 0
DIARRHEA: 0
ABDOMINAL PAIN: 0
NAUSEA: 1
SHORTNESS OF BREATH: 0

## 2024-06-13 NOTE — PROGRESS NOTES
.  Chief Complaint   Patient presents with    Chest Congestion     Chest congestion, nausea, indigestion and constipation for 4 weeks.  Tried Mucinex with some relief.      .  \"Have you been to the ER, urgent care clinic since your last visit?  Hospitalized since your last visit?\"    NO    “Have you seen or consulted any other health care providers outside of [facility] since your last visit?”    NO    ./80 (Site: Right Upper Arm, Position: Sitting, Cuff Size: Medium Adult)   Pulse 80   Ht 1.549 m (5' 1\")   Wt 59.4 kg (131 lb)   SpO2 (!) 83%   BMI 24.75 kg/m²           Click Here for Release of Records Request

## 2024-06-13 NOTE — PROGRESS NOTES
[facility]  [address], [place] [address]  Phone: [phone]      Lori Lambert (: 1932) is a 89 y.o. female, established patient, here for evaluation of the following chief complaint(s):  Chest Congestion (Chest congestion, nausea, indigestion and constipation for 4 weeks.  Tried Mucinex with some relief. )     SUBJECTIVE/OBJECTIVE:  History of Present Illness  89-year-old female with a past medical history of hypertension, hypercholesterolemia, COPD is seen today for evaluation of chest congestion, nausea, belching and constipation.  Patient is with her daughter Aleida.  Daughter informed mom has been having cough and chest congestion for the last few weeks, tried Mucinex to see whether it will help to bring up the mucus but did not help much.  Has thick mucus and congestion but she is unable to bring it up.  Has oxygen at home and uses as needed during the day and night oxygen, oxygen saturation was good at home in 94-96.  She did not have any fever and did not have a COVID test.  Daughter informed mom has indigestion and is belching a lot patient.  Patient denies any heartburn.  Daughter  Informed mom gets constipation and Linzess helps and she takes it maybe once a week.  Alda informed mom did not want to come to the clinic as she was scared Dr. Coronado might put her in the hospital.        Hemoglobin A1C   Date Value Ref Range Status   2023 6.1 (H) 4.8 - 5.6 % Final     Comment:                 Prediabetes: 5.7 - 6.4           Diabetes: >6.4           Glycemic control for adults with diabetes: <7.0        Hemoglobin   Date Value Ref Range Status   2023 10.1 (L) 11.1 - 15.9 g/dL Final   2022 12.8 NA Final     Hematocrit   Date Value Ref Range Status   2023 31.7 (L) 34.0 - 46.6 % Final     Creatinine   Date Value Ref Range Status   2023 1.05 (H) 0.57 - 1.00 mg/dL Final     Glucose   Date Value Ref Range Status

## 2024-06-14 ENCOUNTER — TELEPHONE (OUTPATIENT)
Facility: CLINIC | Age: 89
End: 2024-06-14

## 2024-06-14 RX ORDER — PREDNISONE 10 MG/1
TABLET ORAL
Qty: 15 TABLET | Refills: 0 | Status: SHIPPED | OUTPATIENT
Start: 2024-06-14

## 2024-06-14 RX ORDER — DOXYCYCLINE HYCLATE 100 MG/1
100 CAPSULE ORAL 2 TIMES DAILY
Qty: 20 CAPSULE | Refills: 0 | Status: SHIPPED | OUTPATIENT
Start: 2024-06-14 | End: 2024-06-24

## 2024-06-14 RX ORDER — DOCUSATE SODIUM 100 MG/1
100 CAPSULE, LIQUID FILLED ORAL 2 TIMES DAILY
Qty: 60 CAPSULE | Refills: 0 | Status: SHIPPED | OUTPATIENT
Start: 2024-06-14 | End: 2024-07-14

## 2024-07-02 LAB
ALBUMIN SERPL-MCNC: 3.8 G/DL (ref 3.6–4.6)
ALP SERPL-CCNC: 96 IU/L (ref 44–121)
ALT SERPL-CCNC: 9 IU/L (ref 0–32)
AST SERPL-CCNC: 16 IU/L (ref 0–40)
BASOPHILS # BLD AUTO: 0 X10E3/UL (ref 0–0.2)
BASOPHILS NFR BLD AUTO: 1 %
BILIRUB SERPL-MCNC: 0.4 MG/DL (ref 0–1.2)
BUN SERPL-MCNC: 17 MG/DL (ref 10–36)
BUN/CREAT SERPL: 14 (ref 12–28)
CALCIUM SERPL-MCNC: 10.1 MG/DL (ref 8.7–10.3)
CHLORIDE SERPL-SCNC: 105 MMOL/L (ref 96–106)
CHOLEST SERPL-MCNC: 172 MG/DL (ref 100–199)
CO2 SERPL-SCNC: 24 MMOL/L (ref 20–29)
CREAT SERPL-MCNC: 1.22 MG/DL (ref 0.57–1)
EGFRCR SERPLBLD CKD-EPI 2021: 42 ML/MIN/1.73
EOSINOPHIL # BLD AUTO: 0.3 X10E3/UL (ref 0–0.4)
EOSINOPHIL NFR BLD AUTO: 3 %
ERYTHROCYTE [DISTWIDTH] IN BLOOD BY AUTOMATED COUNT: 13.5 % (ref 11.7–15.4)
FERRITIN SERPL-MCNC: 78 NG/ML (ref 15–150)
GLOBULIN SER CALC-MCNC: 2 G/DL (ref 1.5–4.5)
GLUCOSE SERPL-MCNC: 100 MG/DL (ref 70–99)
HBA1C MFR BLD: 6.2 % (ref 4.8–5.6)
HCT VFR BLD AUTO: 32 % (ref 34–46.6)
HDLC SERPL-MCNC: 74 MG/DL
HGB BLD-MCNC: 10.4 G/DL (ref 11.1–15.9)
IMM GRANULOCYTES # BLD AUTO: 0 X10E3/UL (ref 0–0.1)
IMM GRANULOCYTES NFR BLD AUTO: 0 %
IRON SATN MFR SERPL: 39 % (ref 15–55)
IRON SERPL-MCNC: 91 UG/DL (ref 27–139)
LDLC SERPL CALC-MCNC: 83 MG/DL (ref 0–99)
LYMPHOCYTES # BLD AUTO: 1.8 X10E3/UL (ref 0.7–3.1)
LYMPHOCYTES NFR BLD AUTO: 21 %
MCH RBC QN AUTO: 28.6 PG (ref 26.6–33)
MCHC RBC AUTO-ENTMCNC: 32.5 G/DL (ref 31.5–35.7)
MCV RBC AUTO: 88 FL (ref 79–97)
MONOCYTES # BLD AUTO: 0.7 X10E3/UL (ref 0.1–0.9)
MONOCYTES NFR BLD AUTO: 9 %
NEUTROPHILS # BLD AUTO: 5.5 X10E3/UL (ref 1.4–7)
NEUTROPHILS NFR BLD AUTO: 66 %
PLATELET # BLD AUTO: 243 X10E3/UL (ref 150–450)
POTASSIUM SERPL-SCNC: 5.3 MMOL/L (ref 3.5–5.2)
PROT SERPL-MCNC: 5.8 G/DL (ref 6–8.5)
RBC # BLD AUTO: 3.64 X10E6/UL (ref 3.77–5.28)
SODIUM SERPL-SCNC: 142 MMOL/L (ref 134–144)
TIBC SERPL-MCNC: 233 UG/DL (ref 250–450)
TRIGL SERPL-MCNC: 83 MG/DL (ref 0–149)
TSH SERPL DL<=0.005 MIU/L-ACNC: 2.02 UIU/ML (ref 0.45–4.5)
UIBC SERPL-MCNC: 142 UG/DL (ref 118–369)
VLDLC SERPL CALC-MCNC: 15 MG/DL (ref 5–40)
WBC # BLD AUTO: 8.3 X10E3/UL (ref 3.4–10.8)

## 2024-07-11 ENCOUNTER — TELEMEDICINE (OUTPATIENT)
Facility: CLINIC | Age: 89
End: 2024-07-11
Payer: MEDICARE

## 2024-07-11 DIAGNOSIS — N18.32 STAGE 3B CHRONIC KIDNEY DISEASE (HCC): ICD-10-CM

## 2024-07-11 DIAGNOSIS — E78.00 HYPERCHOLESTEREMIA: ICD-10-CM

## 2024-07-11 DIAGNOSIS — R79.89 ELEVATED SERUM CREATININE: ICD-10-CM

## 2024-07-11 DIAGNOSIS — J44.9 COPD, MODERATE (HCC): ICD-10-CM

## 2024-07-11 DIAGNOSIS — I10 BENIGN ESSENTIAL HTN: Primary | ICD-10-CM

## 2024-07-11 DIAGNOSIS — R73.01 ELEVATED FASTING BLOOD SUGAR: ICD-10-CM

## 2024-07-11 DIAGNOSIS — I10 BENIGN ESSENTIAL HTN: ICD-10-CM

## 2024-07-11 DIAGNOSIS — D64.9 MILD ANEMIA: ICD-10-CM

## 2024-07-11 DIAGNOSIS — E87.5 HYPERKALEMIA: ICD-10-CM

## 2024-07-11 DIAGNOSIS — K59.00 CONSTIPATION, UNSPECIFIED CONSTIPATION TYPE: ICD-10-CM

## 2024-07-11 DIAGNOSIS — E77.8 HYPOPROTEINEMIA (HCC): ICD-10-CM

## 2024-07-11 PROCEDURE — 1090F PRES/ABSN URINE INCON ASSESS: CPT | Performed by: INTERNAL MEDICINE

## 2024-07-11 PROCEDURE — G8427 DOCREV CUR MEDS BY ELIG CLIN: HCPCS | Performed by: INTERNAL MEDICINE

## 2024-07-11 PROCEDURE — 1123F ACP DISCUSS/DSCN MKR DOCD: CPT | Performed by: INTERNAL MEDICINE

## 2024-07-11 PROCEDURE — 99214 OFFICE O/P EST MOD 30 MIN: CPT | Performed by: INTERNAL MEDICINE

## 2024-07-11 RX ORDER — ALBUTEROL SULFATE 90 UG/1
2 AEROSOL, METERED RESPIRATORY (INHALATION) PRN
Qty: 18 G | Refills: 1 | Status: CANCELLED | OUTPATIENT
Start: 2024-07-11

## 2024-07-11 RX ORDER — IPRATROPIUM BROMIDE AND ALBUTEROL SULFATE 2.5; .5 MG/3ML; MG/3ML
SOLUTION RESPIRATORY (INHALATION)
Qty: 180 ML | Refills: 1 | Status: SHIPPED | OUTPATIENT
Start: 2024-07-11

## 2024-07-11 RX ORDER — CLOTRIMAZOLE AND BETAMETHASONE DIPROPIONATE 10; .64 MG/G; MG/G
1 CREAM TOPICAL PRN
Qty: 45 G | Refills: 1 | Status: SHIPPED | OUTPATIENT
Start: 2024-07-11

## 2024-07-11 RX ORDER — ALBUTEROL SULFATE 90 UG/1
2 AEROSOL, METERED RESPIRATORY (INHALATION) PRN
Qty: 18 G | Refills: 4 | Status: SHIPPED | OUTPATIENT
Start: 2024-07-11

## 2024-07-11 RX ORDER — VERAPAMIL HYDROCHLORIDE 120 MG/1
TABLET, FILM COATED ORAL
Qty: 180 TABLET | Refills: 1 | Status: SHIPPED | OUTPATIENT
Start: 2024-07-11

## 2024-07-11 ASSESSMENT — ENCOUNTER SYMPTOMS
ABDOMINAL PAIN: 0
NAUSEA: 0
DIARRHEA: 0
COUGH: 0
CONSTIPATION: 1
SHORTNESS OF BREATH: 0
VOMITING: 0

## 2024-07-11 NOTE — PROGRESS NOTES
.  Chief Complaint   Patient presents with    Discuss Labs    Follow-up Chronic Condition    COPD     \"Have you been to the ER, urgent care clinic since your last visit?  Hospitalized since your last visit?\"    NO    “Have you seen or consulted any other health care providers outside of [facility] since your last visit?”    NO            Click Here for Release of Records Request   
item  -- DELETE ALL ITEMS NOT EXAMINED]    Constitutional: [] Appears well-developed and well-nourished [x] No apparent distress      [] Abnormal - Elderly  pleasant patient with her daughter Aleida    Mental status: [x] Alert and awake  [x] Oriented to person/place/time [x] Able to follow commands    [] Abnormal -     Eyes:   EOM    [x]  Normal    [] Abnormal -   Sclera  [x]  Normal    [] Abnormal -          Discharge [x]  None visible   [] Abnormal -     HENT: [x] Normocephalic, atraumatic  [] Abnormal -   [x] Mouth/Throat: Mucous membranes are moist    External Ears [x] Normal  [] Abnormal -    Neck: [x] No visualized mass [] Abnormal -     Pulmonary/Chest: [x] Respiratory effort normal   [x] No visualized signs of difficulty breathing or respiratory distress        [] Abnormal -      Musculoskeletal:   [] Normal gait with no signs of ataxia         [x] Normal range of motion of neck        [] Abnormal -     Neurological:        [x] No Facial Asymmetry (Cranial nerve 7 motor function) (limited exam due to video visit)          [x] No gaze palsy        [] Abnormal -          Skin:        [x] No significant exanthematous lesions or discoloration noted on facial skin         [] Abnormal -            Psychiatric:       [x] Normal Affect [] Abnormal -        [] No Hallucinations    Other pertinent observable physical exam findings:-    ASSESSMENT/PLAN:  Below is the assessment and plan developed based on review of pertinent history, labs, studies, and medications.    1. Benign essential HTN  Comments:  Blood pressure at home is reasonably good, advised to continue low-sodium diet current medications and monitor blood pressure at home  Orders:  -     Comprehensive Metabolic Panel; Future  2. Elevated serum creatinine  Comments:  Creatinine is high at 1.22, advised to keep water intake, avoid Aleve, naproxen, Advil  3. Hypercholesteremia  Comments:  LDL 83, LFTs normal, advised to continue low-fat diet and simvastatin

## 2024-08-06 RX ORDER — OMEPRAZOLE 20 MG/1
20 CAPSULE, DELAYED RELEASE ORAL DAILY
Qty: 90 CAPSULE | Refills: 0 | Status: SHIPPED | OUTPATIENT
Start: 2024-08-06

## 2024-08-06 RX ORDER — SIMVASTATIN 10 MG
10 TABLET ORAL NIGHTLY
Qty: 30 TABLET | Refills: 1 | Status: SHIPPED | OUTPATIENT
Start: 2024-08-06

## 2024-08-07 RX ORDER — SIMVASTATIN 10 MG
10 TABLET ORAL NIGHTLY
Qty: 90 TABLET | OUTPATIENT
Start: 2024-08-07

## 2024-08-08 RX ORDER — SIMVASTATIN 10 MG
10 TABLET ORAL NIGHTLY
Qty: 30 TABLET | Refills: 3 | Status: SHIPPED | OUTPATIENT
Start: 2024-08-08

## 2024-09-03 ENCOUNTER — HOSPITAL ENCOUNTER (OUTPATIENT)
Facility: HOSPITAL | Age: 89
Discharge: HOME | End: 2024-09-06
Attending: INTERNAL MEDICINE
Payer: MEDICARE

## 2024-09-03 DIAGNOSIS — R91.8 LUNG MASS: ICD-10-CM

## 2024-09-03 DIAGNOSIS — J82.89 PNEUMONIA ALLERGIC (HCC): ICD-10-CM

## 2024-09-03 DIAGNOSIS — J44.9 CHRONIC OBSTRUCTIVE PULMONARY DISEASE, UNSPECIFIED COPD TYPE (HCC): ICD-10-CM

## 2024-09-03 PROCEDURE — 71250 CT THORAX DX C-: CPT

## 2024-11-09 ENCOUNTER — APPOINTMENT (OUTPATIENT)
Facility: HOSPITAL | Age: 89
End: 2024-11-09
Payer: MEDICARE

## 2024-11-09 ENCOUNTER — HOSPITAL ENCOUNTER (EMERGENCY)
Facility: HOSPITAL | Age: 89
Discharge: HOME | End: 2024-11-09
Attending: FAMILY MEDICINE
Payer: MEDICARE

## 2024-11-09 VITALS
SYSTOLIC BLOOD PRESSURE: 158 MMHG | HEART RATE: 82 BPM | OXYGEN SATURATION: 95 % | WEIGHT: 125 LBS | DIASTOLIC BLOOD PRESSURE: 79 MMHG | RESPIRATION RATE: 17 BRPM | TEMPERATURE: 97.6 F | BODY MASS INDEX: 23 KG/M2 | HEIGHT: 62 IN

## 2024-11-09 DIAGNOSIS — K29.00 ACUTE GASTRITIS WITHOUT HEMORRHAGE, UNSPECIFIED GASTRITIS TYPE: ICD-10-CM

## 2024-11-09 DIAGNOSIS — R10.84 GENERALIZED ABDOMINAL PAIN: Primary | ICD-10-CM

## 2024-11-09 DIAGNOSIS — K59.00 CONSTIPATION, UNSPECIFIED CONSTIPATION TYPE: ICD-10-CM

## 2024-11-09 LAB
ALBUMIN SERPL-MCNC: 3.2 G/DL (ref 3.5–5)
ALBUMIN/GLOB SERPL: 0.8 (ref 1.1–2.2)
ALP SERPL-CCNC: 97 U/L (ref 45–117)
ALT SERPL-CCNC: 19 U/L (ref 12–78)
ANION GAP SERPL CALC-SCNC: 10 MMOL/L (ref 2–12)
AST SERPL W P-5'-P-CCNC: 13 U/L (ref 15–37)
BASOPHILS # BLD: 0 K/UL (ref 0–0.1)
BASOPHILS NFR BLD: 0 % (ref 0–1)
BILIRUB SERPL-MCNC: 0.3 MG/DL (ref 0.2–1)
BUN SERPL-MCNC: 35 MG/DL (ref 6–20)
BUN/CREAT SERPL: 29 (ref 12–20)
CA-I BLD-MCNC: 10.4 MG/DL (ref 8.5–10.1)
CHLORIDE SERPL-SCNC: 104 MMOL/L (ref 97–108)
CO2 SERPL-SCNC: 28 MMOL/L (ref 21–32)
CREAT SERPL-MCNC: 1.19 MG/DL (ref 0.55–1.02)
DIFFERENTIAL METHOD BLD: ABNORMAL
EOSINOPHIL # BLD: 0.1 K/UL (ref 0–0.4)
EOSINOPHIL NFR BLD: 1 % (ref 0–7)
ERYTHROCYTE [DISTWIDTH] IN BLOOD BY AUTOMATED COUNT: 13.8 % (ref 11.5–14.5)
GLOBULIN SER CALC-MCNC: 3.8 G/DL (ref 2–4)
GLUCOSE SERPL-MCNC: 118 MG/DL (ref 65–100)
HCT VFR BLD AUTO: 34 % (ref 35–47)
HGB BLD-MCNC: 10.7 G/DL (ref 11.5–16)
IMM GRANULOCYTES # BLD AUTO: 0 K/UL (ref 0–0.04)
IMM GRANULOCYTES NFR BLD AUTO: 0 % (ref 0–0.5)
LIPASE SERPL-CCNC: 80 U/L (ref 13–75)
LYMPHOCYTES # BLD: 1 K/UL (ref 0.8–3.5)
LYMPHOCYTES NFR BLD: 10 % (ref 12–49)
MCH RBC QN AUTO: 28.8 PG (ref 26–34)
MCHC RBC AUTO-ENTMCNC: 31.5 G/DL (ref 30–36.5)
MCV RBC AUTO: 91.6 FL (ref 80–99)
MONOCYTES # BLD: 0.7 K/UL (ref 0–1)
MONOCYTES NFR BLD: 7 % (ref 5–13)
NEUTS SEG # BLD: 8.3 K/UL (ref 1.8–8)
NEUTS SEG NFR BLD: 82 % (ref 32–75)
PLATELET # BLD AUTO: 266 K/UL (ref 150–400)
PMV BLD AUTO: 10.4 FL (ref 8.9–12.9)
POTASSIUM SERPL-SCNC: 3.9 MMOL/L (ref 3.5–5.1)
PROT SERPL-MCNC: 7 G/DL (ref 6.4–8.2)
RBC # BLD AUTO: 3.71 M/UL (ref 3.8–5.2)
SODIUM SERPL-SCNC: 142 MMOL/L (ref 136–145)
WBC # BLD AUTO: 10.2 K/UL (ref 3.6–11)

## 2024-11-09 PROCEDURE — 80053 COMPREHEN METABOLIC PANEL: CPT

## 2024-11-09 PROCEDURE — 99285 EMERGENCY DEPT VISIT HI MDM: CPT

## 2024-11-09 PROCEDURE — 6360000004 HC RX CONTRAST MEDICATION: Performed by: FAMILY MEDICINE

## 2024-11-09 PROCEDURE — 74177 CT ABD & PELVIS W/CONTRAST: CPT

## 2024-11-09 PROCEDURE — 36415 COLL VENOUS BLD VENIPUNCTURE: CPT

## 2024-11-09 PROCEDURE — 83690 ASSAY OF LIPASE: CPT

## 2024-11-09 PROCEDURE — 85025 COMPLETE CBC W/AUTO DIFF WBC: CPT

## 2024-11-09 RX ORDER — POLYETHYLENE GLYCOL 3350 17 G/17G
17 POWDER, FOR SOLUTION ORAL DAILY
Qty: 1 EACH | Refills: 0 | Status: SHIPPED | OUTPATIENT
Start: 2024-11-09 | End: 2024-11-19

## 2024-11-09 RX ORDER — FAMOTIDINE 20 MG/1
20 TABLET, FILM COATED ORAL 2 TIMES DAILY
Qty: 28 TABLET | Refills: 0 | Status: SHIPPED | OUTPATIENT
Start: 2024-11-09 | End: 2024-11-23

## 2024-11-09 RX ORDER — IOPAMIDOL 755 MG/ML
100 INJECTION, SOLUTION INTRAVASCULAR
Status: COMPLETED | OUTPATIENT
Start: 2024-11-09 | End: 2024-11-09

## 2024-11-09 RX ADMIN — IOPAMIDOL 100 ML: 755 INJECTION, SOLUTION INTRAVENOUS at 13:32

## 2024-11-09 ASSESSMENT — PAIN SCALES - GENERAL
PAINLEVEL_OUTOF10: 0
PAINLEVEL_OUTOF10: 0

## 2024-11-09 ASSESSMENT — PAIN - FUNCTIONAL ASSESSMENT: PAIN_FUNCTIONAL_ASSESSMENT: 0-10

## 2024-11-09 NOTE — ED TRIAGE NOTES
Pt c/o a little bit of everything.  Pt woke up this morning constipated and having sharp pain in lower abd. Family states N&V.

## 2024-11-09 NOTE — DISCHARGE INSTRUCTIONS
Thank you for choosing our Emergency Department for your care.  It is our privilege to care for you in your time of need.  In the next several days, you may receive a survey via email or mailed to your home about your experience with our team.  We would greatly appreciate you taking a few minutes to complete the survey, as we use this information to learn what we have done well and what we could be doing better. Thank you for trusting us with your care!    Below you will find a list of your tests from today's visit.   Labs  Recent Results (from the past 12 hour(s))   CBC with Auto Differential    Collection Time: 11/09/24 12:31 PM   Result Value Ref Range    WBC 10.2 3.6 - 11.0 K/uL    RBC 3.71 (L) 3.80 - 5.20 M/uL    Hemoglobin 10.7 (L) 11.5 - 16.0 g/dL    Hematocrit 34.0 (L) 35.0 - 47.0 %    MCV 91.6 80.0 - 99.0 FL    MCH 28.8 26.0 - 34.0 PG    MCHC 31.5 30.0 - 36.5 g/dL    RDW 13.8 11.5 - 14.5 %    Platelets 266 150 - 400 K/uL    MPV 10.4 8.9 - 12.9 FL    Neutrophils % 82 (H) 32 - 75 %    Lymphocytes % 10 (L) 12 - 49 %    Monocytes % 7 5 - 13 %    Eosinophils % 1 0 - 7 %    Basophils % 0 0 - 1 %    Immature Granulocytes % 0 0.0 - 0.5 %    Neutrophils Absolute 8.3 (H) 1.8 - 8.0 K/UL    Lymphocytes Absolute 1.0 0.8 - 3.5 K/UL    Monocytes Absolute 0.7 0.0 - 1.0 K/UL    Eosinophils Absolute 0.1 0.0 - 0.4 K/UL    Basophils Absolute 0.0 0.0 - 0.1 K/UL    Immature Granulocytes Absolute 0.0 0.00 - 0.04 K/UL    Differential Type AUTOMATED     Comprehensive Metabolic Panel    Collection Time: 11/09/24 12:31 PM   Result Value Ref Range    Sodium 142 136 - 145 mmol/L    Potassium 3.9 3.5 - 5.1 mmol/L    Chloride 104 97 - 108 mmol/L    CO2 28 21 - 32 mmol/L    Anion Gap 10 2 - 12 mmol/L    Glucose 118 (H) 65 - 100 mg/dL    BUN 35 (H) 6 - 20 mg/dL    Creatinine 1.19 (H) 0.55 - 1.02 mg/dL    BUN/Creatinine Ratio 29 (H) 12 - 20      Est, Glom Filt Rate 43 (L) >60 ml/min/1.73m2    Calcium 10.4 (H) 8.5 - 10.1 mg/dL    Total

## 2024-11-11 NOTE — ED PROVIDER NOTES
EMERGENCY DEPARTMENT HISTORY AND PHYSICAL EXAM      Date: 11/9/2024  Patient Name: Lori Lambert    History of Presenting Illness       History Provided By:     HPI: Lori Lambert, is a very pleasant 89 y.o. female presenting to the ED with a chief complaint of abdominal pain, nausea vomiting.  States he woke up this morning feeling constipated.  Also having intermittent sharp pains in the mid to lower abdomen.  Nausea with a couple episodes of vomiting.  No bloody nor bilious vomiting.  No bloody nor black stools.  No dysuria hematuria nor frequency.  No fevers chills rigors    Denies any other symptoms at this time.    PCP: Tiki Coronado MD    No current facility-administered medications on file prior to encounter.     Current Outpatient Medications on File Prior to Encounter   Medication Sig Dispense Refill    omeprazole (PRILOSEC) 20 MG delayed release capsule TAKE 1 CAPSULE BY MOUTH DAILY 90 capsule 0    simvastatin (ZOCOR) 10 MG tablet TAKE 1 TABLET BY MOUTH AT BEDTIME 30 tablet 3    simvastatin (ZOCOR) 10 MG tablet TAKE 1 TABLET BY MOUTH AT BEDTIME 30 tablet 1    linaCLOtide (LINZESS) 72 MCG CAPS capsule Take 1 capsule by mouth daily as needed (constipation) 90 capsule 1    verapamil (CALAN) 120 MG tablet TAKE 1 TABLET BY MOUTH TWICE DAILY FOR HIGH BLOOD PRESSURE 180 tablet 1    clotrimazole-betamethasone (LOTRISONE) 1-0.05 % cream Apply 1 each topically as needed (itching) 45 g 1    ipratropium 0.5 mg-albuterol 2.5 mg (DUONEB) 0.5-2.5 (3) MG/3ML SOLN nebulizer solution INHALE 1 VIAL VIA NEBULIZER EVERY 6 HOURS AS NEEDED. 180 mL 1    albuterol sulfate HFA (PROVENTIL;VENTOLIN;PROAIR) 108 (90 Base) MCG/ACT inhaler Inhale 2 puffs into the lungs as needed for Wheezing 18 g 4    ipratropium 0.5 mg-albuterol 2.5 mg (DUONEB) 0.5-2.5 (3) MG/3ML SOLN nebulizer solution INHALE 1 VIAL VIA NEBULIZER EVERY 6 HOURS AS NEEDED. 180 mL 0    ipratropium 0.5 mg-albuterol 2.5 mg (DUONEB) 0.5-2.5 (3) MG/3ML

## 2024-11-19 ENCOUNTER — TELEPHONE (OUTPATIENT)
Facility: CLINIC | Age: 89
End: 2024-11-19

## 2024-11-19 NOTE — TELEPHONE ENCOUNTER
The patient daughter called stating that the patient has a cold on top of CPD and Depressions and needs to be seen today but when looking at the scheduled I do not see anywhere to scheduled a VV and told her I would send a message to ask and then contact her

## 2024-11-19 NOTE — TELEPHONE ENCOUNTER
Patient daughter called again wanting to be put on the schedule for today  let her know I would have to speak to Dr. Coronado since her schedule is full.

## 2024-11-20 ENCOUNTER — OFFICE VISIT (OUTPATIENT)
Facility: CLINIC | Age: 89
End: 2024-11-20
Payer: MEDICARE

## 2024-11-20 VITALS
WEIGHT: 123 LBS | BODY MASS INDEX: 22.63 KG/M2 | DIASTOLIC BLOOD PRESSURE: 60 MMHG | HEART RATE: 72 BPM | SYSTOLIC BLOOD PRESSURE: 112 MMHG | HEIGHT: 62 IN | OXYGEN SATURATION: 95 % | RESPIRATION RATE: 16 BRPM

## 2024-11-20 DIAGNOSIS — J44.1 COPD EXACERBATION (HCC): Primary | ICD-10-CM

## 2024-11-20 DIAGNOSIS — N18.32 STAGE 3B CHRONIC KIDNEY DISEASE (HCC): ICD-10-CM

## 2024-11-20 DIAGNOSIS — R10.9 ABDOMINAL DISCOMFORT: ICD-10-CM

## 2024-11-20 DIAGNOSIS — R25.1 TREMOR: ICD-10-CM

## 2024-11-20 DIAGNOSIS — I10 BENIGN ESSENTIAL HTN: ICD-10-CM

## 2024-11-20 PROCEDURE — 1090F PRES/ABSN URINE INCON ASSESS: CPT | Performed by: INTERNAL MEDICINE

## 2024-11-20 PROCEDURE — 3023F SPIROM DOC REV: CPT | Performed by: INTERNAL MEDICINE

## 2024-11-20 PROCEDURE — 1123F ACP DISCUSS/DSCN MKR DOCD: CPT | Performed by: INTERNAL MEDICINE

## 2024-11-20 PROCEDURE — G8427 DOCREV CUR MEDS BY ELIG CLIN: HCPCS | Performed by: INTERNAL MEDICINE

## 2024-11-20 PROCEDURE — G8484 FLU IMMUNIZE NO ADMIN: HCPCS | Performed by: INTERNAL MEDICINE

## 2024-11-20 PROCEDURE — 1159F MED LIST DOCD IN RCRD: CPT | Performed by: INTERNAL MEDICINE

## 2024-11-20 PROCEDURE — 1036F TOBACCO NON-USER: CPT | Performed by: INTERNAL MEDICINE

## 2024-11-20 PROCEDURE — G8420 CALC BMI NORM PARAMETERS: HCPCS | Performed by: INTERNAL MEDICINE

## 2024-11-20 PROCEDURE — 1160F RVW MEDS BY RX/DR IN RCRD: CPT | Performed by: INTERNAL MEDICINE

## 2024-11-20 PROCEDURE — 99214 OFFICE O/P EST MOD 30 MIN: CPT | Performed by: INTERNAL MEDICINE

## 2024-11-20 RX ORDER — POLYETHYLENE GLYCOL 3350 17 G/17G
POWDER, FOR SOLUTION ORAL
COMMUNITY
Start: 2024-11-09

## 2024-11-20 RX ORDER — IPRATROPIUM BROMIDE AND ALBUTEROL SULFATE 2.5; .5 MG/3ML; MG/3ML
SOLUTION RESPIRATORY (INHALATION)
Qty: 180 ML | Refills: 1 | Status: SHIPPED | OUTPATIENT
Start: 2024-11-20

## 2024-11-20 RX ORDER — DOXYCYCLINE 100 MG/1
100 CAPSULE ORAL 2 TIMES DAILY
Qty: 20 CAPSULE | Refills: 0 | Status: SHIPPED | OUTPATIENT
Start: 2024-11-20 | End: 2024-11-30

## 2024-11-20 RX ORDER — BUDESONIDE, GLYCOPYRROLATE, AND FORMOTEROL FUMARATE 160; 9; 4.8 UG/1; UG/1; UG/1
AEROSOL, METERED RESPIRATORY (INHALATION)
COMMUNITY
Start: 2024-11-01

## 2024-11-20 SDOH — ECONOMIC STABILITY: INCOME INSECURITY: HOW HARD IS IT FOR YOU TO PAY FOR THE VERY BASICS LIKE FOOD, HOUSING, MEDICAL CARE, AND HEATING?: NOT HARD AT ALL

## 2024-11-20 SDOH — ECONOMIC STABILITY: FOOD INSECURITY: WITHIN THE PAST 12 MONTHS, THE FOOD YOU BOUGHT JUST DIDN'T LAST AND YOU DIDN'T HAVE MONEY TO GET MORE.: NEVER TRUE

## 2024-11-20 SDOH — ECONOMIC STABILITY: FOOD INSECURITY: WITHIN THE PAST 12 MONTHS, YOU WORRIED THAT YOUR FOOD WOULD RUN OUT BEFORE YOU GOT MONEY TO BUY MORE.: NEVER TRUE

## 2024-11-20 ASSESSMENT — ANXIETY QUESTIONNAIRES
4. TROUBLE RELAXING: NOT AT ALL
3. WORRYING TOO MUCH ABOUT DIFFERENT THINGS: MORE THAN HALF THE DAYS
7. FEELING AFRAID AS IF SOMETHING AWFUL MIGHT HAPPEN: MORE THAN HALF THE DAYS
2. NOT BEING ABLE TO STOP OR CONTROL WORRYING: NOT AT ALL
6. BECOMING EASILY ANNOYED OR IRRITABLE: NOT AT ALL
IF YOU CHECKED OFF ANY PROBLEMS ON THIS QUESTIONNAIRE, HOW DIFFICULT HAVE THESE PROBLEMS MADE IT FOR YOU TO DO YOUR WORK, TAKE CARE OF THINGS AT HOME, OR GET ALONG WITH OTHER PEOPLE: SOMEWHAT DIFFICULT
GAD7 TOTAL SCORE: 6
1. FEELING NERVOUS, ANXIOUS, OR ON EDGE: MORE THAN HALF THE DAYS
5. BEING SO RESTLESS THAT IT IS HARD TO SIT STILL: NOT AT ALL

## 2024-11-20 ASSESSMENT — ENCOUNTER SYMPTOMS
ABDOMINAL PAIN: 0
DIARRHEA: 0
SHORTNESS OF BREATH: 0
COUGH: 0
NAUSEA: 0
VOMITING: 0

## 2024-11-20 ASSESSMENT — PATIENT HEALTH QUESTIONNAIRE - PHQ9
6. FEELING BAD ABOUT YOURSELF - OR THAT YOU ARE A FAILURE OR HAVE LET YOURSELF OR YOUR FAMILY DOWN: NOT AT ALL
9. THOUGHTS THAT YOU WOULD BE BETTER OFF DEAD, OR OF HURTING YOURSELF: NOT AT ALL
10. IF YOU CHECKED OFF ANY PROBLEMS, HOW DIFFICULT HAVE THESE PROBLEMS MADE IT FOR YOU TO DO YOUR WORK, TAKE CARE OF THINGS AT HOME, OR GET ALONG WITH OTHER PEOPLE: VERY DIFFICULT
1. LITTLE INTEREST OR PLEASURE IN DOING THINGS: NOT AT ALL
8. MOVING OR SPEAKING SO SLOWLY THAT OTHER PEOPLE COULD HAVE NOTICED. OR THE OPPOSITE, BEING SO FIGETY OR RESTLESS THAT YOU HAVE BEEN MOVING AROUND A LOT MORE THAN USUAL: NOT AT ALL
SUM OF ALL RESPONSES TO PHQ9 QUESTIONS 1 & 2: 3
2. FEELING DOWN, DEPRESSED OR HOPELESS: NEARLY EVERY DAY
SUM OF ALL RESPONSES TO PHQ QUESTIONS 1-9: 15
SUM OF ALL RESPONSES TO PHQ QUESTIONS 1-9: 15
5. POOR APPETITE OR OVEREATING: NEARLY EVERY DAY
7. TROUBLE CONCENTRATING ON THINGS, SUCH AS READING THE NEWSPAPER OR WATCHING TELEVISION: NEARLY EVERY DAY
SUM OF ALL RESPONSES TO PHQ QUESTIONS 1-9: 15
SUM OF ALL RESPONSES TO PHQ QUESTIONS 1-9: 15
3. TROUBLE FALLING OR STAYING ASLEEP: NEARLY EVERY DAY
4. FEELING TIRED OR HAVING LITTLE ENERGY: NEARLY EVERY DAY

## 2024-11-20 NOTE — PROGRESS NOTES
Chief Complaint   Patient presents with    Hypertension    Follow-up     ER vist    Congestion    Depression    Tremors     In both hands     1. Have you been to the ER, urgent care clinic since your last visit?  Hospitalized since your last visit? 11/10/2024 constipated and abdominal pain    2. Have you seen or consulted any other health care providers outside of the [facility] since your last visit?  Include any pap smears or colon screening. No  /60 (Site: Left Upper Arm, Position: Sitting, Cuff Size: Medium Adult)   Pulse 72   Resp 16   Ht 1.575 m (5' 2\")   Wt 55.8 kg (123 lb)   SpO2 95%   BMI 22.50 kg/m²     
deformity,  age indeterminate.     To continue omeprazole, will monitor                         4. Benign essential HTN  Comments:  Blood pressure is controlled, advised to continue low-sodium diet and verapamil  5. Stage 3b chronic kidney disease (HCC)  Comments:  Creatinine stable at 1.19, to keep fluid intake and avoid Aleve and Advil      Orders Placed This Encounter    XR CHEST (2 VW)     Standing Status:   Future     Standing Expiration Date:   11/20/2025    [facility] - Yahir Funez, NP, Neurology, [place]     Referral Priority:   Routine     Referral Type:   Eval and Treat     Referral Reason:   Specialty Services Required     Referred to Provider:   Yahir Funez APRN - NP     Requested Specialty:   Neurology     Number of Visits Requested:   1    BREZTRI AEROSPHERE 160-9-4.8 MCG/ACT AERO     Sig: USE 2 PUFFS BY MOUTH TWICE DAILY    polyethylene glycol (GLYCOLAX) 17 GM/SCOOP powder     Sig: DISSOLVE 17 GRAMS IN LIQUID AND DRINK DAILY FOR 10 DAYS    doxycycline hyclate (VIBRAMYCIN) 100 MG capsule     Sig: Take 1 capsule by mouth 2 times daily for 10 days     Dispense:  20 capsule     Refill:  0        No follow-ups on file.     There are no Patient Instructions on file for this visit.     Health Maintenance Due   Topic Date Due    DTaP/Tdap/Td vaccine (1 - Tdap) Never done    Shingles vaccine (1 of 2) Never done    Respiratory Syncytial Virus (RSV) Pregnant or age 60 yrs+ (1 - 1-dose 75+ series) Never done    Pneumococcal 65+ years Vaccine (2 of 2 - PCV) 10/07/2015    Flu vaccine (1) 08/01/2024    COVID-19 Vaccine (3 - 2023-24 season) 09/01/2024        Aspects of this note may have been generated using voice recognition software. Despite editing, there may be unrecognized errors.    The patient (or guardian, if applicable) and other individuals in attendance with the patient were advised that Artificial Intelligence will be utilized during this visit to record and process the conversation to generate a

## 2024-12-09 ENCOUNTER — APPOINTMENT (OUTPATIENT)
Facility: HOSPITAL | Age: 88
End: 2024-12-09
Payer: MEDICARE

## 2024-12-09 ENCOUNTER — HOSPITAL ENCOUNTER (EMERGENCY)
Facility: HOSPITAL | Age: 88
Discharge: HOME | End: 2024-12-09
Payer: MEDICARE

## 2024-12-09 VITALS
WEIGHT: 120 LBS | HEIGHT: 61 IN | BODY MASS INDEX: 22.66 KG/M2 | RESPIRATION RATE: 18 BRPM | DIASTOLIC BLOOD PRESSURE: 68 MMHG | HEART RATE: 66 BPM | OXYGEN SATURATION: 99 % | SYSTOLIC BLOOD PRESSURE: 182 MMHG | TEMPERATURE: 98 F

## 2024-12-09 DIAGNOSIS — S52.572A OTHER CLOSED INTRA-ARTICULAR FRACTURE OF DISTAL END OF LEFT RADIUS, INITIAL ENCOUNTER: Primary | ICD-10-CM

## 2024-12-09 PROCEDURE — 73060 X-RAY EXAM OF HUMERUS: CPT

## 2024-12-09 PROCEDURE — 73080 X-RAY EXAM OF ELBOW: CPT

## 2024-12-09 PROCEDURE — 73110 X-RAY EXAM OF WRIST: CPT

## 2024-12-09 PROCEDURE — 73090 X-RAY EXAM OF FOREARM: CPT

## 2024-12-09 PROCEDURE — 73502 X-RAY EXAM HIP UNI 2-3 VIEWS: CPT

## 2024-12-09 PROCEDURE — 6370000000 HC RX 637 (ALT 250 FOR IP)

## 2024-12-09 PROCEDURE — 99283 EMERGENCY DEPT VISIT LOW MDM: CPT

## 2024-12-09 RX ORDER — ACETAMINOPHEN 500 MG
1000 TABLET ORAL
Status: COMPLETED | OUTPATIENT
Start: 2024-12-09 | End: 2024-12-09

## 2024-12-09 RX ADMIN — ACETAMINOPHEN 1000 MG: 500 TABLET ORAL at 17:51

## 2024-12-09 ASSESSMENT — PAIN - FUNCTIONAL ASSESSMENT: PAIN_FUNCTIONAL_ASSESSMENT: PREVENTS OR INTERFERES SOME ACTIVE ACTIVITIES AND ADLS

## 2024-12-09 ASSESSMENT — PAIN DESCRIPTION - PAIN TYPE: TYPE: ACUTE PAIN

## 2024-12-09 ASSESSMENT — PAIN DESCRIPTION - DESCRIPTORS: DESCRIPTORS: ACHING

## 2024-12-09 ASSESSMENT — PAIN DESCRIPTION - LOCATION: LOCATION: WRIST

## 2024-12-09 ASSESSMENT — PAIN SCALES - GENERAL: PAINLEVEL_OUTOF10: 8

## 2024-12-09 ASSESSMENT — PAIN DESCRIPTION - ORIENTATION: ORIENTATION: LEFT

## 2024-12-09 NOTE — ED TRIAGE NOTES
Patient ambulatory into ED c/o pain to L wrist and L shoulder after falling today. Daughter reports she was closing the car door and the door caught her arm and caused her to fall. Patient did not hit her head or lose consciousness. Does not take blood thinners.

## 2024-12-09 NOTE — ED PROVIDER NOTES
mg (DUONEB) 0.5-2.5 (3) MG/3ML SOLN nebulizer solution USE 1 VIAL VIA NEBULIZER EVERY 6 HOURS AS NEEDED 180 mL 1    BREZTRI AEROSPHERE 160-9-4.8 MCG/ACT AERO USE 2 PUFFS BY MOUTH TWICE DAILY      polyethylene glycol (GLYCOLAX) 17 GM/SCOOP powder DISSOLVE 17 GRAMS IN LIQUID AND DRINK DAILY FOR 10 DAYS      omeprazole (PRILOSEC) 20 MG delayed release capsule TAKE 1 CAPSULE BY MOUTH DAILY 90 capsule 0    simvastatin (ZOCOR) 10 MG tablet TAKE 1 TABLET BY MOUTH AT BEDTIME 30 tablet 3    verapamil (CALAN) 120 MG tablet TAKE 1 TABLET BY MOUTH TWICE DAILY FOR HIGH BLOOD PRESSURE 180 tablet 1    clotrimazole-betamethasone (LOTRISONE) 1-0.05 % cream Apply 1 each topically as needed (itching) 45 g 1    albuterol sulfate HFA (PROVENTIL;VENTOLIN;PROAIR) 108 (90 Base) MCG/ACT inhaler Inhale 2 puffs into the lungs as needed for Wheezing 18 g 4    TURMERIC PO Take 500 mg by mouth daily      aspirin 81 MG EC tablet Take by mouth daily         Social Determinants of Health:   Social Determinants of Health     Tobacco Use: Low Risk  (11/20/2024)    Patient History     Smoking Tobacco Use: Never     Smokeless Tobacco Use: Never     Passive Exposure: Not on file   Alcohol Use: Not At Risk (1/4/2024)    AUDIT-C     Frequency of Alcohol Consumption: Never     Average Number of Drinks: Patient does not drink     Frequency of Binge Drinking: Never   Financial Resource Strain: Low Risk  (11/20/2024)    Overall Financial Resource Strain (CARDIA)     Difficulty of Paying Living Expenses: Not hard at all   Food Insecurity: No Food Insecurity (11/20/2024)    Hunger Vital Sign     Worried About Running Out of Food in the Last Year: Never true     Ran Out of Food in the Last Year: Never true   Transportation Needs: Unknown (11/20/2024)    PRAPARE - Transportation     Lack of Transportation (Medical): Not on file     Lack of Transportation (Non-Medical): No   Physical Activity: Inactive (1/4/2024)    Exercise Vital Sign     Days of Exercise per

## 2024-12-09 NOTE — DISCHARGE INSTRUCTIONS
Thank you for choosing our Emergency Department for your care.  It is our privilege to care for you in your time of need.  In the next several days, you may receive a survey via email or mailed to your home about your experience with our team.  We would greatly appreciate you taking a few minutes to complete the survey, as we use this information to learn what we have done well and what we could be doing better. Thank you for trusting us with your care!    Below you will find a list of your tests from today's visit.   Labs  No results found for this or any previous visit (from the past 12 hour(s)).    Radiologic Studies  XR WRIST LEFT (MIN 3 VIEWS)   Final Result   Acute, mildly angulated intra-articular distal radial metaphyseal   fracture.      Electronically signed by Mejia Longoria MD      XR HIP 2-3 VW W PELVIS LEFT   Final Result   No acute abnormality.      Electronically signed by Mejia Longoria MD      XR RADIUS ULNA LEFT (2 VIEWS)   Final Result   Acute intra-articular distal radial fracture.      Electronically signed by Mejia Longoria MD      XR ELBOW LEFT (MIN 3 VIEWS)   Final Result   No acute abnormality.      Electronically signed by Mejia Longoria MD      XR HUMERUS LEFT (MIN 2 VIEWS)   Final Result   No acute abnormality.      Electronically signed by Mejia Longoria MD        ------------------------------------------------------------------------------------------------------------  The evaluation and treatment you received in the Emergency Department were for an urgent problem. It is important that you follow-up with a doctor, nurse practitioner, or physician assistant to:  (1) confirm your diagnosis,  (2) re-evaluation of changes in your illness and treatment, and (3) for ongoing care. Please take your discharge instructions with you when you go to your follow-up appointment.     If you have any problem arranging a follow-up appointment, contact us!  If your symptoms become worse or you do not improve as

## 2024-12-17 ENCOUNTER — OFFICE VISIT (OUTPATIENT)
Age: 88
End: 2024-12-17

## 2024-12-17 VITALS
DIASTOLIC BLOOD PRESSURE: 62 MMHG | HEART RATE: 74 BPM | BODY MASS INDEX: 22.67 KG/M2 | OXYGEN SATURATION: 96 % | SYSTOLIC BLOOD PRESSURE: 133 MMHG | HEIGHT: 61 IN

## 2024-12-17 DIAGNOSIS — S52.532A CLOSED COLLES' FRACTURE OF LEFT RADIUS, INITIAL ENCOUNTER: Primary | ICD-10-CM
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
## 2024-12-17 NOTE — PROGRESS NOTES
Patient identified by name and date of birth  Lori Lambert is a 89 y.o. female   Chief Complaint   Patient presents with    New Patient     Left wrist      Vitals:    12/17/24 1300   BP: 133/62   Site: Right Upper Arm   Pulse: 74   SpO2: 96%   Height: 1.549 m (5' 1\")       No LMP recorded.        \"Have you been to the ER, urgent care clinic since your last visit?  Hospitalized since your last visit?\"    NO    “Have you seen or consulted any other health care providers outside of [facility] since your last visit?”    NO      
Hypercalcemia      Hypercholesteremia              Past Surgical History:  Past Surgical History         Past Surgical History:   Procedure Laterality Date    CHOLECYSTECTOMY   03/2016     Dr. Tineo    TUBAL LIGATION                Family History:  Family History         Family History   Problem Relation Age of Onset    Stroke Mother      Heart Failure Father      Heart Failure Mother          Allergies:  Allergies         Allergies   Allergen Reactions    Penicillins         Other reaction(s): Unknown (comments)            PCP: Tiki Coronado MD     Current Meds:   Current Facility-Administered Medications[]Expand by Default   No current facility-administered medications for this encounter.             Current Outpatient Medications   Medication Sig Dispense Refill    ipratropium 0.5 mg-albuterol 2.5 mg (DUONEB) 0.5-2.5 (3) MG/3ML SOLN nebulizer solution USE 1 VIAL VIA NEBULIZER EVERY 6 HOURS AS NEEDED 180 mL 1    BREZTRI AEROSPHERE 160-9-4.8 MCG/ACT AERO USE 2 PUFFS BY MOUTH TWICE DAILY        polyethylene glycol (GLYCOLAX) 17 GM/SCOOP powder DISSOLVE 17 GRAMS IN LIQUID AND DRINK DAILY FOR 10 DAYS        omeprazole (PRILOSEC) 20 MG delayed release capsule TAKE 1 CAPSULE BY MOUTH DAILY 90 capsule 0    simvastatin (ZOCOR) 10 MG tablet TAKE 1 TABLET BY MOUTH AT BEDTIME 30 tablet 3    verapamil (CALAN) 120 MG tablet TAKE 1 TABLET BY MOUTH TWICE DAILY FOR HIGH BLOOD PRESSURE 180 tablet 1         clotrimazole-betamethasone (LOTRISONE) 1-0.05 % cream Apply 1 each topically as needed (itching) 45 g 1    albuterol sulfate HFA (PROVENTIL;VENTOLIN;PROAIR) 108 (90 Base) MCG/ACT inhaler Inhale 2 puffs into the lungs as needed for Wheezing 18 g 4    TURMERIC PO Take 500 mg by mouth daily        aspirin 81 MG EC tablet Take by mouth daily             Review of Systems  Denies any fevers or chills or upper respiratory infections.  Denies any head injury or diplopia or headaches.    Denies any neck pain.    Denies any shortness

## 2024-12-19 ENCOUNTER — TELEPHONE (OUTPATIENT)
Age: 88
End: 2024-12-19

## 2024-12-19 NOTE — TELEPHONE ENCOUNTER
Aleida called stating that patient was given a cast for her fracture and that during the night the cast came off. Aleida is requesting a call back. Call back at [phone]

## 2024-12-20 ENCOUNTER — OFFICE VISIT (OUTPATIENT)
Age: 88
End: 2024-12-20

## 2024-12-20 VITALS
BODY MASS INDEX: 22.66 KG/M2 | TEMPERATURE: 98 F | OXYGEN SATURATION: 95 % | DIASTOLIC BLOOD PRESSURE: 62 MMHG | SYSTOLIC BLOOD PRESSURE: 133 MMHG | HEART RATE: 74 BPM | HEIGHT: 61 IN | WEIGHT: 120 LBS

## 2024-12-20 DIAGNOSIS — S52.532D CLOSED COLLES' FRACTURE OF LEFT RADIUS WITH ROUTINE HEALING, SUBSEQUENT ENCOUNTER: Primary | ICD-10-CM
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
## 2024-12-20 ASSESSMENT — PATIENT HEALTH QUESTIONNAIRE - PHQ9
SUM OF ALL RESPONSES TO PHQ9 QUESTIONS 1 & 2: 0
SUM OF ALL RESPONSES TO PHQ QUESTIONS 1-9: 0
SUM OF ALL RESPONSES TO PHQ QUESTIONS 1-9: 0
1. LITTLE INTEREST OR PLEASURE IN DOING THINGS: NOT AT ALL
SUM OF ALL RESPONSES TO PHQ QUESTIONS 1-9: 0
2. FEELING DOWN, DEPRESSED OR HOPELESS: NOT AT ALL
SUM OF ALL RESPONSES TO PHQ QUESTIONS 1-9: 0

## 2024-12-20 NOTE — PROGRESS NOTES
Identified pt with two pt identifiers (name and ). Reviewed chart in preparation for visit and have obtained necessary documentation.    Lori Lambert is a 89 y.o. female Cast Removal (Re casting due to swelling going down and cast coming off in her sleep )  .    Vitals:    24 1128   BP: 133/62   Pulse: 74   Temp: 98 °F (36.7 °C)   TempSrc: Temporal   SpO2: 95%   Weight: 54.4 kg (120 lb)   Height: 1.549 m (5' 0.98\")          1. Have you been to the ER, urgent care clinic since your last visit?  Hospitalized since your last visit?  no     2. Have you seen or consulted any other health care providers outside of the [facility] since your last visit?  Include any pap smears or colon screening.  no  
each topically as needed (itching) 45 g 1    albuterol sulfate HFA (PROVENTIL;VENTOLIN;PROAIR) 108 (90 Base) MCG/ACT inhaler Inhale 2 puffs into the lungs as needed for Wheezing 18 g 4    TURMERIC PO Take 500 mg by mouth daily        aspirin 81 MG EC tablet Take by mouth daily            Review of Systems  Denies any fevers or chills or upper respiratory infections.  Denies any head injury or diplopia or headaches.     Denies any neck pain.     Denies any shortness of breath or chest pain.     Denies any abdominal pain.     Musculoskeletal positive for left wrist pain and swelling, inability to move the fingers due to the splint.  Positive for mild left shoulder pain from the fall and left elbow pain.  There were no fractures in these areas.  Bruising noted in the elbow forearm and wrist.     Neurological denies any paresthesias or weakness in the left hand.  Objective:   Ortho Exam  Evaluation of the left wrist shows a moderate degree of swelling over the wrist but this has improved since the cast was placed a couple of days ago.   The fingers were quite crowded together from the prior splint producing swelling, but this has since resolved significantly.  Moderate ecchymosis noted.  The elbow is nontender.  Shoulder shows some mild tenderness anteriorly and laterally.  There is good range of motion of the shoulder and elbow.     Neurologic exam left upper extremity is normal.    Assessment:     1. Closed Colles' fracture of left radius with routine healing, subsequent encounter    2. Fall, subsequent encounter          Plan:     A new short arm cast was applied today.  Instructed to leave this on at all times.    Follow-up with the usual scheduled time for repeat x-rays of the wrist after removing the cast.

## 2025-01-01 RX ORDER — VERAPAMIL HYDROCHLORIDE 120 MG/1
TABLET ORAL
Qty: 180 TABLET | Refills: 1 | Status: SHIPPED | OUTPATIENT
Start: 2025-01-01

## 2025-01-10 LAB
ALBUMIN SERPL-MCNC: 3.7 G/DL (ref 3.6–4.6)
ALP SERPL-CCNC: 111 IU/L (ref 44–121)
ALT SERPL-CCNC: 7 IU/L (ref 0–32)
AST SERPL-CCNC: 14 IU/L (ref 0–40)
BASOPHILS # BLD AUTO: 0.1 X10E3/UL (ref 0–0.2)
BASOPHILS NFR BLD AUTO: 1 %
BILIRUB SERPL-MCNC: 0.3 MG/DL (ref 0–1.2)
BUN SERPL-MCNC: 22 MG/DL (ref 10–36)
BUN/CREAT SERPL: 20 (ref 12–28)
CALCIUM SERPL-MCNC: 10.1 MG/DL (ref 8.7–10.3)
CHLORIDE SERPL-SCNC: 100 MMOL/L (ref 96–106)
CHOLEST SERPL-MCNC: 168 MG/DL (ref 100–199)
CO2 SERPL-SCNC: 27 MMOL/L (ref 20–29)
CREAT SERPL-MCNC: 1.1 MG/DL (ref 0.57–1)
EGFRCR SERPLBLD CKD-EPI 2021: 47 ML/MIN/1.73
EOSINOPHIL # BLD AUTO: 0.3 X10E3/UL (ref 0–0.4)
EOSINOPHIL NFR BLD AUTO: 3 %
ERYTHROCYTE [DISTWIDTH] IN BLOOD BY AUTOMATED COUNT: 12.9 % (ref 11.7–15.4)
GLOBULIN SER CALC-MCNC: 2.4 G/DL (ref 1.5–4.5)
GLUCOSE SERPL-MCNC: 100 MG/DL (ref 70–99)
HBA1C MFR BLD: 5.9 % (ref 4.8–5.6)
HCT VFR BLD AUTO: 31.2 % (ref 34–46.6)
HDLC SERPL-MCNC: 72 MG/DL
HGB BLD-MCNC: 9.3 G/DL (ref 11.1–15.9)
IMM GRANULOCYTES # BLD AUTO: 0 X10E3/UL (ref 0–0.1)
IMM GRANULOCYTES NFR BLD AUTO: 0 %
LDLC SERPL CALC-MCNC: 81 MG/DL (ref 0–99)
LYMPHOCYTES # BLD AUTO: 1.6 X10E3/UL (ref 0.7–3.1)
LYMPHOCYTES NFR BLD AUTO: 16 %
MCH RBC QN AUTO: 27.6 PG (ref 26.6–33)
MCHC RBC AUTO-ENTMCNC: 29.8 G/DL (ref 31.5–35.7)
MCV RBC AUTO: 93 FL (ref 79–97)
MONOCYTES # BLD AUTO: 0.8 X10E3/UL (ref 0.1–0.9)
MONOCYTES NFR BLD AUTO: 9 %
NEUTROPHILS # BLD AUTO: 6.8 X10E3/UL (ref 1.4–7)
NEUTROPHILS NFR BLD AUTO: 71 %
PLATELET # BLD AUTO: 317 X10E3/UL (ref 150–450)
POTASSIUM SERPL-SCNC: 4.6 MMOL/L (ref 3.5–5.2)
PROT SERPL-MCNC: 6.1 G/DL (ref 6–8.5)
RBC # BLD AUTO: 3.37 X10E6/UL (ref 3.77–5.28)
SODIUM SERPL-SCNC: 141 MMOL/L (ref 134–144)
TRIGL SERPL-MCNC: 81 MG/DL (ref 0–149)
VLDLC SERPL CALC-MCNC: 15 MG/DL (ref 5–40)
WBC # BLD AUTO: 9.6 X10E3/UL (ref 3.4–10.8)

## 2025-01-14 ENCOUNTER — OFFICE VISIT (OUTPATIENT)
Facility: CLINIC | Age: 89
End: 2025-01-14
Payer: MEDICARE

## 2025-01-14 VITALS
OXYGEN SATURATION: 99 % | HEIGHT: 60 IN | SYSTOLIC BLOOD PRESSURE: 140 MMHG | HEART RATE: 66 BPM | WEIGHT: 118 LBS | DIASTOLIC BLOOD PRESSURE: 64 MMHG | BODY MASS INDEX: 23.16 KG/M2 | TEMPERATURE: 97.5 F

## 2025-01-14 DIAGNOSIS — E78.00 HYPERCHOLESTEREMIA: ICD-10-CM

## 2025-01-14 DIAGNOSIS — D64.9 MILD ANEMIA: ICD-10-CM

## 2025-01-14 DIAGNOSIS — I10 BENIGN ESSENTIAL HTN: ICD-10-CM

## 2025-01-14 DIAGNOSIS — R73.01 ELEVATED FASTING BLOOD SUGAR: ICD-10-CM

## 2025-01-14 DIAGNOSIS — Z78.0 MENOPAUSE: ICD-10-CM

## 2025-01-14 DIAGNOSIS — S52.532A CLOSED COLLES' FRACTURE OF LEFT RADIUS, INITIAL ENCOUNTER: ICD-10-CM

## 2025-01-14 DIAGNOSIS — N18.32 STAGE 3B CHRONIC KIDNEY DISEASE (HCC): ICD-10-CM

## 2025-01-14 DIAGNOSIS — J44.1 COPD EXACERBATION (HCC): Primary | ICD-10-CM

## 2025-01-14 PROCEDURE — 1090F PRES/ABSN URINE INCON ASSESS: CPT | Performed by: INTERNAL MEDICINE

## 2025-01-14 PROCEDURE — 3023F SPIROM DOC REV: CPT | Performed by: INTERNAL MEDICINE

## 2025-01-14 PROCEDURE — 1036F TOBACCO NON-USER: CPT | Performed by: INTERNAL MEDICINE

## 2025-01-14 PROCEDURE — G8420 CALC BMI NORM PARAMETERS: HCPCS | Performed by: INTERNAL MEDICINE

## 2025-01-14 PROCEDURE — 1159F MED LIST DOCD IN RCRD: CPT | Performed by: INTERNAL MEDICINE

## 2025-01-14 PROCEDURE — 1123F ACP DISCUSS/DSCN MKR DOCD: CPT | Performed by: INTERNAL MEDICINE

## 2025-01-14 PROCEDURE — G8427 DOCREV CUR MEDS BY ELIG CLIN: HCPCS | Performed by: INTERNAL MEDICINE

## 2025-01-14 PROCEDURE — 1160F RVW MEDS BY RX/DR IN RCRD: CPT | Performed by: INTERNAL MEDICINE

## 2025-01-14 PROCEDURE — 99214 OFFICE O/P EST MOD 30 MIN: CPT | Performed by: INTERNAL MEDICINE

## 2025-01-14 RX ORDER — IPRATROPIUM BROMIDE AND ALBUTEROL SULFATE 2.5; .5 MG/3ML; MG/3ML
SOLUTION RESPIRATORY (INHALATION)
Qty: 180 ML | Refills: 1 | Status: SHIPPED | OUTPATIENT
Start: 2025-01-14

## 2025-01-14 RX ORDER — BUSPIRONE HYDROCHLORIDE 5 MG/1
5 TABLET ORAL DAILY PRN
Qty: 60 TABLET | Refills: 2 | Status: SHIPPED | OUTPATIENT
Start: 2025-01-14 | End: 2025-07-13

## 2025-01-14 SDOH — ECONOMIC STABILITY: FOOD INSECURITY: WITHIN THE PAST 12 MONTHS, YOU WORRIED THAT YOUR FOOD WOULD RUN OUT BEFORE YOU GOT MONEY TO BUY MORE.: NEVER TRUE

## 2025-01-14 SDOH — ECONOMIC STABILITY: FOOD INSECURITY: WITHIN THE PAST 12 MONTHS, THE FOOD YOU BOUGHT JUST DIDN'T LAST AND YOU DIDN'T HAVE MONEY TO GET MORE.: NEVER TRUE

## 2025-01-14 ASSESSMENT — PATIENT HEALTH QUESTIONNAIRE - PHQ9
SUM OF ALL RESPONSES TO PHQ QUESTIONS 1-9: 0
5. POOR APPETITE OR OVEREATING: NOT AT ALL
SUM OF ALL RESPONSES TO PHQ QUESTIONS 1-9: 0
1. LITTLE INTEREST OR PLEASURE IN DOING THINGS: NOT AT ALL
2. FEELING DOWN, DEPRESSED OR HOPELESS: NOT AT ALL
7. TROUBLE CONCENTRATING ON THINGS, SUCH AS READING THE NEWSPAPER OR WATCHING TELEVISION: NOT AT ALL
9. THOUGHTS THAT YOU WOULD BE BETTER OFF DEAD, OR OF HURTING YOURSELF: NOT AT ALL
8. MOVING OR SPEAKING SO SLOWLY THAT OTHER PEOPLE COULD HAVE NOTICED. OR THE OPPOSITE, BEING SO FIGETY OR RESTLESS THAT YOU HAVE BEEN MOVING AROUND A LOT MORE THAN USUAL: NOT AT ALL
3. TROUBLE FALLING OR STAYING ASLEEP: NOT AT ALL
SUM OF ALL RESPONSES TO PHQ QUESTIONS 1-9: 0
10. IF YOU CHECKED OFF ANY PROBLEMS, HOW DIFFICULT HAVE THESE PROBLEMS MADE IT FOR YOU TO DO YOUR WORK, TAKE CARE OF THINGS AT HOME, OR GET ALONG WITH OTHER PEOPLE: NOT DIFFICULT AT ALL
6. FEELING BAD ABOUT YOURSELF - OR THAT YOU ARE A FAILURE OR HAVE LET YOURSELF OR YOUR FAMILY DOWN: NOT AT ALL
SUM OF ALL RESPONSES TO PHQ QUESTIONS 1-9: 0
SUM OF ALL RESPONSES TO PHQ9 QUESTIONS 1 & 2: 0
4. FEELING TIRED OR HAVING LITTLE ENERGY: NOT AT ALL

## 2025-01-14 ASSESSMENT — ENCOUNTER SYMPTOMS
ABDOMINAL PAIN: 0
VOMITING: 0
DIARRHEA: 0
NAUSEA: 0
COUGH: 1
SHORTNESS OF BREATH: 0

## 2025-01-14 NOTE — PROGRESS NOTES
[facility]  [address], [place] [address]  Phone: [phone]      Lori Lambert (: 1932) is a 89 y.o. female, established patient, here for evaluation of the following chief complaint(s):  Follow-up, Hypertension, and Discuss Labs         SUBJECTIVE/OBJECTIVE:  History of Present Illness  The patient is a 89-year-old female with a past medical history of hypertension, COPD, chronic kidney disease, elevated fasting sugar, and hearing loss. She is seen today for a follow-up of blood pressure and review of her blood test done on 2025. She is accompanied by her daughter,Alda.    She has been monitoring her blood pressure at home, which has consistently been around 140. She recently refilled her prescriptions for verapamil and simvastatin.    Her anxiety symptoms have shown improvement, although they persist. She is not currently on any medication for anxiety, but her daughter is considering initiating a mild treatment regimen. Her sleep pattern is characterized by frequent napping, waking up around 9 AM, then sleeping again until 1 or 2 PM, staying awake until approximately 9 PM, and retiring to bed between 10 and 10:30 PM.    She experienced a fall in 2024, resulting in a left wrist fracture. The incident occurred when she was exiting a vehicle and was struck by the door, causing her to lose balance and fall. She sought immediate medical attention at the emergency room on 2024, where a cast was applied to her wrist. A follow-up appointment with Dr. Coronado is scheduled for Thursday, during which the cast is expected to be removed.    Her cold and cough symptoms have improved following antibiotic treatment. She continues to experience mild congestion and cough, which are more pronounced at night. She has a scheduled appointment with Dr. Rollins within the next 30 days. She is currently on Breztri and albuterol nebulizer and

## 2025-01-16 ENCOUNTER — OFFICE VISIT (OUTPATIENT)
Age: 89
End: 2025-01-16
Payer: MEDICARE

## 2025-01-16 VITALS
SYSTOLIC BLOOD PRESSURE: 151 MMHG | HEIGHT: 60 IN | RESPIRATION RATE: 18 BRPM | TEMPERATURE: 97.6 F | OXYGEN SATURATION: 91 % | DIASTOLIC BLOOD PRESSURE: 60 MMHG | WEIGHT: 122.58 LBS | BODY MASS INDEX: 24.07 KG/M2
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
## 2025-01-16 DIAGNOSIS — S52.532D CLOSED COLLES' FRACTURE OF LEFT RADIUS WITH ROUTINE HEALING, SUBSEQUENT ENCOUNTER: Primary | ICD-10-CM

## 2025-01-16 PROCEDURE — 1160F RVW MEDS BY RX/DR IN RCRD: CPT | Performed by: ORTHOPAEDIC SURGERY

## 2025-01-16 PROCEDURE — G8420 CALC BMI NORM PARAMETERS: HCPCS | Performed by: ORTHOPAEDIC SURGERY

## 2025-01-16 PROCEDURE — 1090F PRES/ABSN URINE INCON ASSESS: CPT | Performed by: ORTHOPAEDIC SURGERY

## 2025-01-16 PROCEDURE — 1123F ACP DISCUSS/DSCN MKR DOCD: CPT | Performed by: ORTHOPAEDIC SURGERY

## 2025-01-16 PROCEDURE — 1125F AMNT PAIN NOTED PAIN PRSNT: CPT | Performed by: ORTHOPAEDIC SURGERY

## 2025-01-16 PROCEDURE — G8427 DOCREV CUR MEDS BY ELIG CLIN: HCPCS | Performed by: ORTHOPAEDIC SURGERY

## 2025-01-16 PROCEDURE — 1159F MED LIST DOCD IN RCRD: CPT | Performed by: ORTHOPAEDIC SURGERY

## 2025-01-16 PROCEDURE — 99213 OFFICE O/P EST LOW 20 MIN: CPT | Performed by: ORTHOPAEDIC SURGERY

## 2025-01-16 PROCEDURE — 1036F TOBACCO NON-USER: CPT | Performed by: ORTHOPAEDIC SURGERY

## 2025-01-16 ASSESSMENT — PATIENT HEALTH QUESTIONNAIRE - PHQ9
SUM OF ALL RESPONSES TO PHQ9 QUESTIONS 1 & 2: 0
SUM OF ALL RESPONSES TO PHQ QUESTIONS 1-9: 0
2. FEELING DOWN, DEPRESSED OR HOPELESS: NOT AT ALL
SUM OF ALL RESPONSES TO PHQ QUESTIONS 1-9: 0
1. LITTLE INTEREST OR PLEASURE IN DOING THINGS: NOT AT ALL

## 2025-01-16 NOTE — PROGRESS NOTES
Identified pt with two pt identifiers (name and ). Reviewed chart in preparation for visit and have obtained necessary documentation.    Lori Lambert is a 89 y.o. female  Chief Complaint   Patient presents with    Follow-up     Left Wrist      BP (!) 151/60 (Site: Right Upper Arm, Position: Sitting, Cuff Size: Small Adult)   Temp 97.6 °F (36.4 °C) (Temporal)   Resp 18   Ht 1.524 m (5')   Wt 55.6 kg (122 lb 9.2 oz)   SpO2 91%   BMI 23.94 kg/m²     1. Have you been to the ER, urgent care clinic since your last visit?  Hospitalized since your last visit?NO    2. Have you seen or consulted any other health care providers outside of the [facility] since your last visit?  Include any pap smears or colon screening. NO

## 2025-01-16 NOTE — PROGRESS NOTES
Subjective:      Patient ID: Lori Lambert is a 89 y.o.  female.    Chief Complaint   Patient presents with    Follow-up     Left Wrist    Patient returns in follow-up of left distal radius fracture.  She has maintained the cast that was placed on 12/20/2024.  She is now a little over 6 weeks from the fracture.  She denies having had much pain.  She is here for repeat x-rays of the wrist out of cast.    HISTORY: The patient is a pleasant 89-year-old female brought in by her daughter on 12/17/2024 for evaluation of an acute left nondominant arm distal radius fracture, sustained on 12/09/2024 when she fell outside while closing a car door.  The car door struck her shoulder and she lost her balance, and she landed directly onto the left wrist.  X-rays in the emergency department at [facility] showed a slightly impacted and minimally displaced distal radius fracture.  She was placed in a sugar-tong splint that included the elbow.  She was quite uncomfortable with the splint on stating that all of her fingers are crowded together and that she has no motion in her fingers.  Her elbow also hurts from the splint.  No other fractures noted.     HPI from [facility] ED on 12/09/2024 by Dr. Ryder: Lori Lambert is a 89 y.o. female who presents with left arm and hip pain status post fall.  Patient reports that she was getting out of the car, tried to close the door and the door bumped into her, knocking her over onto her left side.  Denies head injury.  Not on blood thinners.  Currently reports left wrist and arm pain, as well as left hip pain.  She has been ambulatory after the incident.        Social History     Occupational History    Not on file   Tobacco Use    Smoking status: Never    Smokeless tobacco: Never   Vaping Use    Vaping status: Never Used   Substance and Sexual Activity    Alcohol use: Never    Drug use: Never    Sexual activity: Not Currently     Partners: Male      Past Medical History:

## 2025-02-11 ENCOUNTER — OFFICE VISIT (OUTPATIENT)
Age: 89
End: 2025-02-11
Payer: MEDICARE

## 2025-02-11 VITALS
HEIGHT: 60 IN | DIASTOLIC BLOOD PRESSURE: 63 MMHG | WEIGHT: 122 LBS | HEART RATE: 76 BPM | OXYGEN SATURATION: 95 % | SYSTOLIC BLOOD PRESSURE: 165 MMHG | BODY MASS INDEX: 23.95 KG/M2

## 2025-02-11 DIAGNOSIS — S52.532D CLOSED COLLES' FRACTURE OF LEFT RADIUS WITH ROUTINE HEALING, SUBSEQUENT ENCOUNTER: Primary | ICD-10-CM
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
## 2025-02-11 DIAGNOSIS — S52.532A CLOSED COLLES' FRACTURE OF LEFT RADIUS, INITIAL ENCOUNTER: ICD-10-CM

## 2025-02-11 PROCEDURE — 99213 OFFICE O/P EST LOW 20 MIN: CPT | Performed by: ORTHOPAEDIC SURGERY

## 2025-02-11 PROCEDURE — 1090F PRES/ABSN URINE INCON ASSESS: CPT | Performed by: ORTHOPAEDIC SURGERY

## 2025-02-11 PROCEDURE — 1123F ACP DISCUSS/DSCN MKR DOCD: CPT | Performed by: ORTHOPAEDIC SURGERY

## 2025-02-11 PROCEDURE — 1159F MED LIST DOCD IN RCRD: CPT | Performed by: ORTHOPAEDIC SURGERY

## 2025-02-11 PROCEDURE — 1160F RVW MEDS BY RX/DR IN RCRD: CPT | Performed by: ORTHOPAEDIC SURGERY

## 2025-02-11 PROCEDURE — 1126F AMNT PAIN NOTED NONE PRSNT: CPT | Performed by: ORTHOPAEDIC SURGERY

## 2025-02-11 PROCEDURE — G8420 CALC BMI NORM PARAMETERS: HCPCS | Performed by: ORTHOPAEDIC SURGERY

## 2025-02-11 PROCEDURE — G8427 DOCREV CUR MEDS BY ELIG CLIN: HCPCS | Performed by: ORTHOPAEDIC SURGERY

## 2025-02-11 PROCEDURE — 1036F TOBACCO NON-USER: CPT | Performed by: ORTHOPAEDIC SURGERY

## 2025-02-11 ASSESSMENT — PATIENT HEALTH QUESTIONNAIRE - PHQ9
SUM OF ALL RESPONSES TO PHQ QUESTIONS 1-9: 0
1. LITTLE INTEREST OR PLEASURE IN DOING THINGS: NOT AT ALL
SUM OF ALL RESPONSES TO PHQ QUESTIONS 1-9: 0
2. FEELING DOWN, DEPRESSED OR HOPELESS: NOT AT ALL
SUM OF ALL RESPONSES TO PHQ9 QUESTIONS 1 & 2: 0

## 2025-02-11 NOTE — PROGRESS NOTES
Identified pt with two pt identifiers (name and ). Reviewed chart in preparation for visit and have obtained necessary documentation.    Lori Lambert is a 89 y.o. female Follow-up (Left wrist )  .    Vitals:    25 1351 25 1354   BP: (!) 165/62 (!) 165/63   Site: Right Upper Arm Right Upper Arm   Position: Sitting Sitting   Cuff Size: Medium Adult Medium Adult   Pulse: 72 76   SpO2: 92% 95%   Weight: 55.3 kg (122 lb)    Height: 1.524 m (5')           1. Have you been to the ER, urgent care clinic since your last visit?  Hospitalized since your last visit?  no     2. Have you seen or consulted any other health care providers outside of the [facility] since your last visit?  Include any pap smears or colon screening.  no  
Denies any head injury or diplopia or headaches.     Denies any neck pain.     Denies any shortness of breath or chest pain.     Denies any abdominal pain.     Musculoskeletal positive for left wrist pain and swelling, inability to move the fingers due to the splint.  Positive for mild left shoulder pain from the fall and left elbow pain.  There were no fractures in these areas.  Bruising noted in the elbow forearm and wrist.     Neurological denies any paresthesias or weakness in the left hand.  Objective:   Ortho Exam  Evaluation of the left wrist shows a mild  degree of swelling over the wrist but this has improved since the cast was placed. The fingers were quite crowded together from the prior splint producing swelling, but this has since resolved significantly.  There is now full range of motion to the fingers and she can easily make a fist.  All of the ecchymosis has resolved. The elbow is nontender.  Shoulder shows some mild tenderness anteriorly and laterally.  There is good range of motion of the shoulder and elbow.     Neurologic exam left upper extremity is normal    Assessment:     1. Closed Colles' fracture of left radius with routine healing, subsequent encounter    2. Fall, subsequent encounter    3. Closed Colles' fracture of left radius, initial encounter      X-rays of the left wrist shows complete healing of the fracture and the fracture lines are no longer visible.  The wrist remains anatomically aligned.  Moderate osteopenia is present.  Radiographic results are shared with the patient and family today.    Plan:     I have recommended discontinuation of the splint around the house.  However she should wear this for 1 more month outdoors where she may fall.    No further x-rays needed.  Follow-up as needed.  All questions were answered.

## 2025-03-11 ENCOUNTER — TELEMEDICINE (OUTPATIENT)
Facility: CLINIC | Age: 89
End: 2025-03-11
Payer: MEDICARE

## 2025-03-11 DIAGNOSIS — J06.9 URI, ACUTE: Primary | ICD-10-CM

## 2025-03-11 PROCEDURE — G8427 DOCREV CUR MEDS BY ELIG CLIN: HCPCS | Performed by: INTERNAL MEDICINE

## 2025-03-11 PROCEDURE — 1090F PRES/ABSN URINE INCON ASSESS: CPT | Performed by: INTERNAL MEDICINE

## 2025-03-11 PROCEDURE — 1159F MED LIST DOCD IN RCRD: CPT | Performed by: INTERNAL MEDICINE

## 2025-03-11 PROCEDURE — 99213 OFFICE O/P EST LOW 20 MIN: CPT | Performed by: INTERNAL MEDICINE

## 2025-03-11 PROCEDURE — 1160F RVW MEDS BY RX/DR IN RCRD: CPT | Performed by: INTERNAL MEDICINE

## 2025-03-11 PROCEDURE — 1123F ACP DISCUSS/DSCN MKR DOCD: CPT | Performed by: INTERNAL MEDICINE

## 2025-03-11 RX ORDER — AZITHROMYCIN 250 MG/1
TABLET, FILM COATED ORAL
Qty: 6 TABLET | Refills: 0 | Status: SHIPPED | OUTPATIENT
Start: 2025-03-11

## 2025-03-11 ASSESSMENT — ENCOUNTER SYMPTOMS
COUGH: 1
SHORTNESS OF BREATH: 1
ABDOMINAL PAIN: 0
VOMITING: 0
NAUSEA: 0
SORE THROAT: 1
DIARRHEA: 0

## 2025-03-11 NOTE — PROGRESS NOTES
.  Chief Complaint   Patient presents with    Cold Symptoms     Coughing with \"off white\" phlegm, runny nose and body aches.   Denies any other symptoms. Others in house had upper respiratory infection. Has taken one dose of Delsym.      .  \"Have you been to the ER, urgent care clinic since your last visit?  Hospitalized since your last visit?\"    NO    “Have you seen or consulted any other health care providers outside our system since your last visit?”    NO             
caregiver was present when appropriate.   The patient was located at Home: [address] [facility] [address]  Provider was located at Facility (Appt Dept): [address] [place] [address]  Confirm you are appropriately licensed, registered, or certified to deliver care in the state where the patient is located as indicated above. If you are not or unsure, please re-schedule the visit: Yes, I confirm.      The patient (or guardian, if applicable) and other individuals in attendance with the patient were advised that Artificial Intelligence will be utilized during this visit to record and process the conversation to generate a clinical note. The patient (or guardian, if applicable) and other individuals in attendance at the appointment consented to the use of AI, including the recording.        Aspects of this note may have been generated using voice recognition software. Despite editing, there may be unrecognized errors.    An electronic signature was used to authenticate this note.  -- Tiki Coronado MD

## 2025-03-17 ENCOUNTER — HOSPITAL ENCOUNTER (EMERGENCY)
Facility: HOSPITAL | Age: 89
Discharge: HOME | DRG: 177 | End: 2025-03-17
Attending: EMERGENCY MEDICINE
Payer: MEDICARE

## 2025-03-17 ENCOUNTER — APPOINTMENT (OUTPATIENT)
Facility: HOSPITAL | Age: 89
DRG: 177 | End: 2025-03-17
Payer: MEDICARE

## 2025-03-17 VITALS
RESPIRATION RATE: 17 BRPM | BODY MASS INDEX: 22.66 KG/M2 | DIASTOLIC BLOOD PRESSURE: 56 MMHG | TEMPERATURE: 98 F | SYSTOLIC BLOOD PRESSURE: 149 MMHG | OXYGEN SATURATION: 97 % | HEART RATE: 83 BPM | HEIGHT: 61 IN | WEIGHT: 120 LBS

## 2025-03-17 DIAGNOSIS — J44.1 COPD EXACERBATION (HCC): Primary | ICD-10-CM

## 2025-03-17 LAB
ANION GAP SERPL CALC-SCNC: 6 MMOL/L (ref 2–12)
BASOPHILS # BLD: 0 K/UL (ref 0–0.1)
BASOPHILS NFR BLD: 0 % (ref 0–1)
BUN SERPL-MCNC: 27 MG/DL (ref 6–20)
BUN/CREAT SERPL: 25 (ref 12–20)
CA-I BLD-MCNC: 10.7 MG/DL (ref 8.5–10.1)
CHLORIDE SERPL-SCNC: 105 MMOL/L (ref 97–108)
CO2 SERPL-SCNC: 29 MMOL/L (ref 21–32)
CREAT SERPL-MCNC: 1.1 MG/DL (ref 0.55–1.02)
DIFFERENTIAL METHOD BLD: ABNORMAL
EOSINOPHIL # BLD: 1.16 K/UL (ref 0–0.4)
EOSINOPHIL NFR BLD: 8 % (ref 0–7)
ERYTHROCYTE [DISTWIDTH] IN BLOOD BY AUTOMATED COUNT: 15.8 % (ref 11.5–14.5)
GLUCOSE SERPL-MCNC: 119 MG/DL (ref 65–100)
HCT VFR BLD AUTO: 28 % (ref 35–47)
HGB BLD-MCNC: 8.2 G/DL (ref 11.5–16)
IMM GRANULOCYTES # BLD AUTO: 0 K/UL
IMM GRANULOCYTES NFR BLD AUTO: 0 %
LYMPHOCYTES # BLD: 2.61 K/UL (ref 0.8–3.5)
LYMPHOCYTES NFR BLD: 18 % (ref 12–49)
MCH RBC QN AUTO: 24.9 PG (ref 26–34)
MCHC RBC AUTO-ENTMCNC: 29.3 G/DL (ref 30–36.5)
MCV RBC AUTO: 85.1 FL (ref 80–99)
MONOCYTES # BLD: 1.45 K/UL (ref 0–1)
MONOCYTES NFR BLD: 10 % (ref 5–13)
NEUTS SEG # BLD: 9.28 K/UL (ref 1.8–8)
NEUTS SEG NFR BLD: 64 % (ref 32–75)
NRBC # BLD: 0 K/UL (ref 0–0.01)
NRBC BLD-RTO: 0 PER 100 WBC
PLATELET # BLD AUTO: 530 K/UL (ref 150–400)
PMV BLD AUTO: 10.2 FL (ref 8.9–12.9)
POTASSIUM SERPL-SCNC: 4.4 MMOL/L (ref 3.5–5.1)
RBC # BLD AUTO: 3.29 M/UL (ref 3.8–5.2)
RBC MORPH BLD: ABNORMAL
SODIUM SERPL-SCNC: 140 MMOL/L (ref 136–145)
TROPONIN I SERPL HS-MCNC: 9 NG/L (ref 0–51)
WBC # BLD AUTO: 14.5 K/UL (ref 3.6–11)

## 2025-03-17 PROCEDURE — 80048 BASIC METABOLIC PNL TOTAL CA: CPT

## 2025-03-17 PROCEDURE — 94640 AIRWAY INHALATION TREATMENT: CPT

## 2025-03-17 PROCEDURE — 6360000002 HC RX W HCPCS: Performed by: EMERGENCY MEDICINE

## 2025-03-17 PROCEDURE — 71045 X-RAY EXAM CHEST 1 VIEW: CPT

## 2025-03-17 PROCEDURE — 99284 EMERGENCY DEPT VISIT MOD MDM: CPT

## 2025-03-17 PROCEDURE — 85025 COMPLETE CBC W/AUTO DIFF WBC: CPT

## 2025-03-17 PROCEDURE — 36415 COLL VENOUS BLD VENIPUNCTURE: CPT

## 2025-03-17 PROCEDURE — 96374 THER/PROPH/DIAG INJ IV PUSH: CPT

## 2025-03-17 PROCEDURE — 6370000000 HC RX 637 (ALT 250 FOR IP): Performed by: EMERGENCY MEDICINE

## 2025-03-17 PROCEDURE — 84484 ASSAY OF TROPONIN QUANT: CPT

## 2025-03-17 RX ORDER — IPRATROPIUM BROMIDE AND ALBUTEROL SULFATE 2.5; .5 MG/3ML; MG/3ML
1 SOLUTION RESPIRATORY (INHALATION)
Status: DISPENSED | OUTPATIENT
Start: 2025-03-17 | End: 2025-03-17

## 2025-03-17 RX ORDER — DEXAMETHASONE SODIUM PHOSPHATE 10 MG/ML
10 INJECTION, SOLUTION INTRAMUSCULAR; INTRAVENOUS
Status: COMPLETED | OUTPATIENT
Start: 2025-03-17 | End: 2025-03-17

## 2025-03-17 RX ORDER — LEVOFLOXACIN 500 MG/1
500 TABLET, FILM COATED ORAL DAILY
Qty: 5 TABLET | Refills: 0 | Status: ON HOLD | OUTPATIENT
Start: 2025-03-17 | End: 2025-03-22

## 2025-03-17 RX ADMIN — IPRATROPIUM BROMIDE AND ALBUTEROL SULFATE 1 DOSE: 2.5; .5 SOLUTION RESPIRATORY (INHALATION) at 17:45

## 2025-03-17 RX ADMIN — DEXAMETHASONE SODIUM PHOSPHATE 10 MG: 10 INJECTION INTRAMUSCULAR; INTRAVENOUS at 19:15

## 2025-03-17 ASSESSMENT — LIFESTYLE VARIABLES
HOW MANY STANDARD DRINKS CONTAINING ALCOHOL DO YOU HAVE ON A TYPICAL DAY: PATIENT DOES NOT DRINK
HOW OFTEN DO YOU HAVE A DRINK CONTAINING ALCOHOL: NEVER

## 2025-03-17 ASSESSMENT — PAIN - FUNCTIONAL ASSESSMENT: PAIN_FUNCTIONAL_ASSESSMENT: 0-10

## 2025-03-17 ASSESSMENT — PAIN SCALES - GENERAL: PAINLEVEL_OUTOF10: 0

## 2025-03-17 NOTE — ED TRIAGE NOTES
Bib ems from home for SOB. Has home oxygen concentrator and was on 3L with increased WOB upon ems arrival.     Placed on EMS oxygen at 1L and WOB improved, SOB decreased.   Possible issue with oxygen concentrator at home- EMS spoke with daughter about that.     Daughter endorses patient hasn't been taking her medications due to not feeling well lately.     Upon arrival patient's RR is unlabored and controled in low 20's, denies CP.     HX COPD 2L NC baseline- per records PCP told daughter to increase to 3L for patient's SOB recently.

## 2025-03-17 NOTE — DISCHARGE INSTRUCTIONS
Thank you for choosing our Emergency Department for your care.  It is our privilege to care for you in your time of need.  In the next several days, you may receive a survey via email or mailed to your home about your experience with our team.  We would greatly appreciate you taking a few minutes to complete the survey, as we use this information to learn what we have done well and what we could be doing better. Thank you for trusting us with your care!    Below you will find a list of your tests from today's visit.   Labs and Radiology Studies  Recent Results (from the past 12 hours)   CBC with Auto Differential    Collection Time: 03/17/25  4:27 PM   Result Value Ref Range    WBC 14.5 (H) 3.6 - 11.0 K/uL    RBC 3.29 (L) 3.80 - 5.20 M/uL    Hemoglobin 8.2 (L) 11.5 - 16.0 g/dL    Hematocrit 28.0 (L) 35.0 - 47.0 %    MCV 85.1 80.0 - 99.0 FL    MCH 24.9 (L) 26.0 - 34.0 PG    MCHC 29.3 (L) 30.0 - 36.5 g/dL    RDW 15.8 (H) 11.5 - 14.5 %    Platelets 530 (H) 150 - 400 K/uL    MPV 10.2 8.9 - 12.9 FL    Nucleated RBCs 0.0 0.0  WBC    nRBC 0.00 0.00 - 0.01 K/uL    Neutrophils % 64.0 32 - 75 %    Lymphocytes % 18.0 12 - 49 %    Monocytes % 10.0 5 - 13 %    Eosinophils % 8.0 (H) 0 - 7 %    Basophils % 0.0 0 - 1 %    Immature Granulocytes % 0.0 %    Neutrophils Absolute 9.28 (H) 1.8 - 8.0 K/UL    Lymphocytes Absolute 2.61 0.8 - 3.5 K/UL    Monocytes Absolute 1.45 (H) 0.0 - 1.0 K/UL    Eosinophils Absolute 1.16 (H) 0.0 - 0.4 K/UL    Basophils Absolute 0.00 0.0 - 0.1 K/UL    Immature Granulocytes Absolute 0.00 K/UL    Differential Type Manual      RBC Comment Normocytic, Normochromic     Basic Metabolic Panel    Collection Time: 03/17/25  4:27 PM   Result Value Ref Range    Sodium 140 136 - 145 mmol/L    Potassium 4.4 3.5 - 5.1 mmol/L    Chloride 105 97 - 108 mmol/L    CO2 29 21 - 32 mmol/L    Anion Gap 6 2 - 12 mmol/L    Glucose 119 (H) 65 - 100 mg/dL    BUN 27 (H) 6 - 20 mg/dL    Creatinine 1.10 (H) 0.55 - 1.02  mg/dL    BUN/Creatinine Ratio 25 (H) 12 - 20      Est, Glom Filt Rate 47 (L) >60 ml/min/1.73m2    Calcium 10.7 (H) 8.5 - 10.1 mg/dL   Troponin    Collection Time: 03/17/25  4:27 PM   Result Value Ref Range    Troponin, High Sensitivity 9 0 - 51 ng/L     XR CHEST PORTABLE  Result Date: 3/17/2025  INDICATION:   sob COMPARISON: 9/3/2024 FINDINGS: AP portable upright view of the chest demonstrates a stable prominent cardiopericardial silhouette.There is no pleural effusion.There is no focal consolidation.There is no pneumothorax.     No acute intrathoracic process is identified. Electronically signed by NATHAN OWENS    ------------------------------------------------------------------------------------------------------------  The evaluation and treatment you received in the Emergency Department were for an urgent problem. It is important that you follow-up with a doctor, nurse practitioner, or physician assistant to:  (1) confirm your diagnosis,  (2) re-evaluation of changes in your illness and treatment, and (3) for ongoing care. Please take your discharge instructions with you when you go to your follow-up appointment.     If you have any problem arranging a follow-up appointment, contact us!  If your symptoms become worse or you do not improve as expected, please return to us. We are available 24 hours a day.     If a prescription has been provided, please fill it as soon as possible to prevent a delay in treatment. If you have any questions or reservations about taking the medication due to side effects or interactions with other medications, please call your primary care provider or contact us directly.  Again, THANK YOU for choosing us to care for YOU!

## 2025-03-17 NOTE — ED PROVIDER NOTES
[facility] EMERGENCY DEPT  EMERGENCY DEPARTMENT HISTORY AND PHYSICAL EXAM      Date: 3/17/2025  Patient Name: Lori Lambert  MRN: [id number]  YOB: 1932  Date of evaluation: 3/17/2025  Provider: Ray Weir MD   Note Started: 5:42 PM EDT 3/17/25    HISTORY OF PRESENT ILLNESS     Chief Complaint   Patient presents with    Shortness of Breath       History Provided By: Patient    HPI: Lori Lambert is a 89 y.o. female with history of hypertension, hyperlipidemia, COPD presenting with shortness of breath.  This been going on for the past 2 weeks.  Daughter states family has been dealing with URI symptoms which they have all been getting over.  The patient finished a course of Z-Nik about 1 week ago which continues to feel short of breath.  However she states she does not appear short of breath more than usual and has not been coughing more than usual.  She has been refusing to take her Breztri primarily because she has to walk to the bathroom and spent a lot of time gargling after the medications    PAST MEDICAL HISTORY   Past Medical History:  Past Medical History:   Diagnosis Date    Benign essential HTN     Bilateral hearing loss, unspecified hearing loss type     COPD, moderate (HCC)     Depression, controlled     Elevated fasting blood sugar     Elevated serum creatinine     Hypercalcemia     Hypercholesteremia        Past Surgical History:  Past Surgical History:   Procedure Laterality Date    CHOLECYSTECTOMY  03/2016    Dr. Tineo    TUBAL LIGATION         Family History:  Family History   Problem Relation Age of Onset    Stroke Mother     Heart Failure Father     Heart Failure Mother        Social History:  Social History     Tobacco Use    Smoking status: Never    Smokeless tobacco: Never   Vaping Use    Vaping status: Never Used   Substance Use Topics    Alcohol use: Never    Drug use: Never       Allergies:  Allergies   Allergen Reactions    Penicillins      Other reaction(s): Unknown  (comments)       PCP: Tiki Coronado MD    Current Meds:   No current facility-administered medications for this encounter.     Current Outpatient Medications   Medication Sig Dispense Refill    levoFLOXacin (LEVAQUIN) 500 MG tablet Take 1 tablet by mouth daily for 5 days 5 tablet 0    azithromycin (ZITHROMAX Z-LALA) 250 MG tablet 2 tabs first day, then 1 tab daily for 4 days 6 tablet 0    busPIRone (BUSPAR) 5 MG tablet Take 1 tablet by mouth daily as needed (anxiety) 60 tablet 2    ipratropium 0.5 mg-albuterol 2.5 mg (DUONEB) 0.5-2.5 (3) MG/3ML SOLN nebulizer solution USE 1 VIAL VIA NEBULIZER EVERY 6 HOURS AS NEEDED 180 mL 1    omeprazole (PRILOSEC) 20 MG delayed release capsule Take 1 capsule by mouth daily 90 capsule 2    verapamil (CALAN) 120 MG tablet TAKE 1 TABLET BY MOUTH TWICE DAILY FOR HIGH BLOOD PRESSURE 180 tablet 1    BREZTRI AEROSPHERE 160-9-4.8 MCG/ACT AERO USE 2 PUFFS BY MOUTH TWICE DAILY      polyethylene glycol (GLYCOLAX) 17 GM/SCOOP powder Using PRN      simvastatin (ZOCOR) 10 MG tablet TAKE 1 TABLET BY MOUTH AT BEDTIME 30 tablet 3    clotrimazole-betamethasone (LOTRISONE) 1-0.05 % cream Apply 1 each topically as needed (itching) 45 g 1    albuterol sulfate HFA (PROVENTIL;VENTOLIN;PROAIR) 108 (90 Base) MCG/ACT inhaler Inhale 2 puffs into the lungs as needed for Wheezing 18 g 4    TURMERIC PO Take 500 mg by mouth daily      aspirin 81 MG EC tablet Take by mouth daily         Social Determinants of Health:   Social Drivers of Health     Tobacco Use: Low Risk  (3/11/2025)    Patient History     Smoking Tobacco Use: Never     Smokeless Tobacco Use: Never     Passive Exposure: Not on file   Alcohol Use: Not At Risk (3/17/2025)    AUDIT-C     Frequency of Alcohol Consumption: Never     Average Number of Drinks: Patient does not drink     Frequency of Binge Drinking: Never   Financial Resource Strain: Low Risk  (11/20/2024)    Overall Financial Resource Strain (CARDIA)     Difficulty of Paying Living

## 2025-03-20 ENCOUNTER — APPOINTMENT (OUTPATIENT)
Facility: HOSPITAL | Age: 89
DRG: 177 | End: 2025-03-20
Payer: MEDICARE

## 2025-03-20 ENCOUNTER — HOSPITAL ENCOUNTER (INPATIENT)
Facility: HOSPITAL | Age: 89
LOS: 7 days | Discharge: HOSPICE | DRG: 177 | End: 2025-03-27
Attending: EMERGENCY MEDICINE | Admitting: INTERNAL MEDICINE
Payer: MEDICARE

## 2025-03-20 DIAGNOSIS — R41.82 ALTERED MENTAL STATUS, UNSPECIFIED ALTERED MENTAL STATUS TYPE: ICD-10-CM

## 2025-03-20 DIAGNOSIS — R91.1 LEFT UPPER LOBE PULMONARY NODULE: ICD-10-CM

## 2025-03-20 DIAGNOSIS — E78.00 HYPERCHOLESTEREMIA: Primary | ICD-10-CM

## 2025-03-20 PROBLEM — G93.41 ACUTE METABOLIC ENCEPHALOPATHY: Status: ACTIVE | Noted: 2025-03-20

## 2025-03-20 LAB
ALBUMIN SERPL-MCNC: 2.9 G/DL (ref 3.5–5)
ALBUMIN/GLOB SERPL: 0.9 (ref 1.1–2.2)
ALP SERPL-CCNC: 96 U/L (ref 45–117)
ALT SERPL-CCNC: 17 U/L (ref 12–78)
ANION GAP SERPL CALC-SCNC: 4 MMOL/L (ref 2–12)
APPEARANCE UR: CLEAR
AST SERPL W P-5'-P-CCNC: 19 U/L (ref 15–37)
BACTERIA URNS QL MICRO: NEGATIVE /HPF
BASE EXCESS BLDV CALC-SCNC: 4.7 MMOL/L
BASOPHILS # BLD: 0.04 K/UL (ref 0–0.1)
BASOPHILS NFR BLD: 0.3 % (ref 0–1)
BILIRUB SERPL-MCNC: 0.3 MG/DL (ref 0.2–1)
BILIRUB UR QL: NEGATIVE
BUN SERPL-MCNC: 48 MG/DL (ref 6–20)
BUN/CREAT SERPL: 38 (ref 12–20)
CA-I BLD-MCNC: 10.2 MG/DL (ref 8.5–10.1)
CHLORIDE SERPL-SCNC: 107 MMOL/L (ref 97–108)
CO2 SERPL-SCNC: 30 MMOL/L (ref 21–32)
COLOR UR: NORMAL
CREAT SERPL-MCNC: 1.27 MG/DL (ref 0.55–1.02)
DIFFERENTIAL METHOD BLD: ABNORMAL
EKG ATRIAL RATE: 73 BPM
EKG DIAGNOSIS: NORMAL
EKG P AXIS: 79 DEGREES
EKG P-R INTERVAL: 144 MS
EKG Q-T INTERVAL: 386 MS
EKG QRS DURATION: 78 MS
EKG QTC CALCULATION (BAZETT): 425 MS
EKG R AXIS: 82 DEGREES
EKG T AXIS: 75 DEGREES
EKG VENTRICULAR RATE: 73 BPM
EOSINOPHIL # BLD: 0.82 K/UL (ref 0–0.4)
EOSINOPHIL NFR BLD: 6.3 % (ref 0–7)
EPITH CASTS URNS QL MICRO: NORMAL /LPF
ERYTHROCYTE [DISTWIDTH] IN BLOOD BY AUTOMATED COUNT: 16 % (ref 11.5–14.5)
GLOBULIN SER CALC-MCNC: 3.4 G/DL (ref 2–4)
GLUCOSE BLD STRIP.AUTO-MCNC: 91 MG/DL (ref 65–100)
GLUCOSE SERPL-MCNC: 86 MG/DL (ref 65–100)
GLUCOSE UR STRIP.AUTO-MCNC: NEGATIVE MG/DL
HCO3 BLDV-SCNC: 30.1 MMOL/L (ref 22–26)
HCT VFR BLD AUTO: 26.6 % (ref 35–47)
HGB BLD-MCNC: 7.9 G/DL (ref 11.5–16)
HGB UR QL STRIP: NEGATIVE
IMM GRANULOCYTES # BLD AUTO: 0.07 K/UL (ref 0–0.04)
IMM GRANULOCYTES NFR BLD AUTO: 0.5 % (ref 0–0.5)
INR PPP: 1.1 (ref 0.9–1.1)
KETONES UR QL STRIP.AUTO: NEGATIVE MG/DL
LEUKOCYTE ESTERASE UR QL STRIP.AUTO: NEGATIVE
LYMPHOCYTES # BLD: 1.66 K/UL (ref 0.8–3.5)
LYMPHOCYTES NFR BLD: 12.7 % (ref 12–49)
MAGNESIUM SERPL-MCNC: 1.9 MG/DL (ref 1.6–2.4)
MCH RBC QN AUTO: 24.8 PG (ref 26–34)
MCHC RBC AUTO-ENTMCNC: 29.7 G/DL (ref 30–36.5)
MCV RBC AUTO: 83.6 FL (ref 80–99)
MONOCYTES # BLD: 1.26 K/UL (ref 0–1)
MONOCYTES NFR BLD: 9.6 % (ref 5–13)
NEUTS SEG # BLD: 9.21 K/UL (ref 1.8–8)
NEUTS SEG NFR BLD: 70.6 % (ref 32–75)
NITRITE UR QL STRIP.AUTO: NEGATIVE
NRBC # BLD: 0 K/UL (ref 0–0.01)
NRBC BLD-RTO: 0 PER 100 WBC
PCO2 BLDV: 47.9 MMHG (ref 41–52)
PERFORMED BY:: ABNORMAL
PERFORMED BY:: NORMAL
PH BLDV: 7.41 (ref 7.23–7.44)
PH UR STRIP: 6 (ref 5–8)
PLATELET # BLD AUTO: 472 K/UL (ref 150–400)
PMV BLD AUTO: 9.7 FL (ref 8.9–12.9)
PO2 BLDV: <27 MMHG (ref 25–40)
POTASSIUM SERPL-SCNC: 4.3 MMOL/L (ref 3.5–5.1)
PROT SERPL-MCNC: 6.3 G/DL (ref 6.4–8.2)
PROT UR STRIP-MCNC: NEGATIVE MG/DL
PROTHROMBIN TIME: 14.2 SEC (ref 11.9–14.6)
RBC # BLD AUTO: 3.18 M/UL (ref 3.8–5.2)
RBC #/AREA URNS HPF: NORMAL /HPF (ref 0–5)
SERVICE CMNT-IMP: ABNORMAL
SODIUM SERPL-SCNC: 141 MMOL/L (ref 136–145)
SP GR UR REFRACTOMETRY: 1.01 (ref 1–1.03)
SPECIMEN TYPE: ABNORMAL
TROPONIN I SERPL HS-MCNC: 14 NG/L (ref 0–51)
UROBILINOGEN UR QL STRIP.AUTO: 0.1 EU/DL (ref 0.1–1)
WBC # BLD AUTO: 13.1 K/UL (ref 3.6–11)
WBC URNS QL MICRO: NORMAL /HPF (ref 0–4)

## 2025-03-20 PROCEDURE — 87086 URINE CULTURE/COLONY COUNT: CPT

## 2025-03-20 PROCEDURE — 83735 ASSAY OF MAGNESIUM: CPT

## 2025-03-20 PROCEDURE — 2580000003 HC RX 258: Performed by: EMERGENCY MEDICINE

## 2025-03-20 PROCEDURE — 71045 X-RAY EXAM CHEST 1 VIEW: CPT

## 2025-03-20 PROCEDURE — 36415 COLL VENOUS BLD VENIPUNCTURE: CPT

## 2025-03-20 PROCEDURE — 6370000000 HC RX 637 (ALT 250 FOR IP): Performed by: EMERGENCY MEDICINE

## 2025-03-20 PROCEDURE — 6370000000 HC RX 637 (ALT 250 FOR IP): Performed by: INTERNAL MEDICINE

## 2025-03-20 PROCEDURE — 71250 CT THORAX DX C-: CPT

## 2025-03-20 PROCEDURE — 2500000003 HC RX 250 WO HCPCS: Performed by: INTERNAL MEDICINE

## 2025-03-20 PROCEDURE — 6360000002 HC RX W HCPCS: Performed by: STUDENT IN AN ORGANIZED HEALTH CARE EDUCATION/TRAINING PROGRAM

## 2025-03-20 PROCEDURE — 6370000000 HC RX 637 (ALT 250 FOR IP): Performed by: PHYSICIAN ASSISTANT

## 2025-03-20 PROCEDURE — 85610 PROTHROMBIN TIME: CPT

## 2025-03-20 PROCEDURE — 6360000002 HC RX W HCPCS: Performed by: INTERNAL MEDICINE

## 2025-03-20 PROCEDURE — 80053 COMPREHEN METABOLIC PANEL: CPT

## 2025-03-20 PROCEDURE — 84484 ASSAY OF TROPONIN QUANT: CPT

## 2025-03-20 PROCEDURE — 93005 ELECTROCARDIOGRAM TRACING: CPT | Performed by: EMERGENCY MEDICINE

## 2025-03-20 PROCEDURE — 82962 GLUCOSE BLOOD TEST: CPT

## 2025-03-20 PROCEDURE — 99285 EMERGENCY DEPT VISIT HI MDM: CPT

## 2025-03-20 PROCEDURE — 94640 AIRWAY INHALATION TREATMENT: CPT

## 2025-03-20 PROCEDURE — 85025 COMPLETE CBC W/AUTO DIFF WBC: CPT

## 2025-03-20 PROCEDURE — 96374 THER/PROPH/DIAG INJ IV PUSH: CPT

## 2025-03-20 PROCEDURE — 81001 URINALYSIS AUTO W/SCOPE: CPT

## 2025-03-20 PROCEDURE — 70450 CT HEAD/BRAIN W/O DYE: CPT

## 2025-03-20 PROCEDURE — 1100000000 HC RM PRIVATE

## 2025-03-20 RX ORDER — SODIUM CHLORIDE 0.9 % (FLUSH) 0.9 %
5-40 SYRINGE (ML) INJECTION EVERY 12 HOURS SCHEDULED
Status: DISCONTINUED | OUTPATIENT
Start: 2025-03-20 | End: 2025-03-27 | Stop reason: HOSPADM

## 2025-03-20 RX ORDER — LORAZEPAM 0.5 MG/1
0.5 TABLET ORAL
Status: COMPLETED | OUTPATIENT
Start: 2025-03-20 | End: 2025-03-20

## 2025-03-20 RX ORDER — SODIUM CHLORIDE 9 MG/ML
INJECTION, SOLUTION INTRAVENOUS PRN
Status: DISCONTINUED | OUTPATIENT
Start: 2025-03-20 | End: 2025-03-27 | Stop reason: HOSPADM

## 2025-03-20 RX ORDER — ACETAMINOPHEN 325 MG/1
650 TABLET ORAL EVERY 6 HOURS PRN
Status: DISCONTINUED | OUTPATIENT
Start: 2025-03-20 | End: 2025-03-27 | Stop reason: HOSPADM

## 2025-03-20 RX ORDER — VERAPAMIL HYDROCHLORIDE 120 MG/1
120 TABLET ORAL EVERY 12 HOURS SCHEDULED
Status: DISCONTINUED | OUTPATIENT
Start: 2025-03-20 | End: 2025-03-27 | Stop reason: HOSPADM

## 2025-03-20 RX ORDER — ONDANSETRON 2 MG/ML
4 INJECTION INTRAMUSCULAR; INTRAVENOUS EVERY 6 HOURS PRN
Status: DISCONTINUED | OUTPATIENT
Start: 2025-03-20 | End: 2025-03-21

## 2025-03-20 RX ORDER — 0.9 % SODIUM CHLORIDE 0.9 %
500 INTRAVENOUS SOLUTION INTRAVENOUS ONCE
Status: COMPLETED | OUTPATIENT
Start: 2025-03-20 | End: 2025-03-20

## 2025-03-20 RX ORDER — PANTOPRAZOLE SODIUM 40 MG/1
40 TABLET, DELAYED RELEASE ORAL
Status: DISCONTINUED | OUTPATIENT
Start: 2025-03-21 | End: 2025-03-27 | Stop reason: HOSPADM

## 2025-03-20 RX ORDER — ENOXAPARIN SODIUM 100 MG/ML
30 INJECTION SUBCUTANEOUS DAILY
Status: DISCONTINUED | OUTPATIENT
Start: 2025-03-20 | End: 2025-03-27 | Stop reason: HOSPADM

## 2025-03-20 RX ORDER — BUSPIRONE HYDROCHLORIDE 5 MG/1
5 TABLET ORAL DAILY PRN
Status: DISCONTINUED | OUTPATIENT
Start: 2025-03-20 | End: 2025-03-27 | Stop reason: HOSPADM

## 2025-03-20 RX ORDER — HYDRALAZINE HYDROCHLORIDE 20 MG/ML
10 INJECTION INTRAMUSCULAR; INTRAVENOUS ONCE
Status: COMPLETED | OUTPATIENT
Start: 2025-03-20 | End: 2025-03-20

## 2025-03-20 RX ORDER — ACETAMINOPHEN 650 MG/1
650 SUPPOSITORY RECTAL EVERY 6 HOURS PRN
Status: DISCONTINUED | OUTPATIENT
Start: 2025-03-20 | End: 2025-03-27 | Stop reason: HOSPADM

## 2025-03-20 RX ORDER — ASPIRIN 81 MG/1
81 TABLET ORAL DAILY
Status: DISCONTINUED | OUTPATIENT
Start: 2025-03-21 | End: 2025-03-27 | Stop reason: HOSPADM

## 2025-03-20 RX ORDER — IPRATROPIUM BROMIDE AND ALBUTEROL SULFATE 2.5; .5 MG/3ML; MG/3ML
1 SOLUTION RESPIRATORY (INHALATION) EVERY 4 HOURS PRN
Status: DISCONTINUED | OUTPATIENT
Start: 2025-03-20 | End: 2025-03-27 | Stop reason: HOSPADM

## 2025-03-20 RX ORDER — HYDRALAZINE HYDROCHLORIDE 20 MG/ML
20 INJECTION INTRAMUSCULAR; INTRAVENOUS EVERY 6 HOURS PRN
Status: DISCONTINUED | OUTPATIENT
Start: 2025-03-20 | End: 2025-03-27 | Stop reason: HOSPADM

## 2025-03-20 RX ORDER — ONDANSETRON 4 MG/1
4 TABLET, ORALLY DISINTEGRATING ORAL EVERY 8 HOURS PRN
Status: DISCONTINUED | OUTPATIENT
Start: 2025-03-20 | End: 2025-03-21

## 2025-03-20 RX ORDER — SODIUM CHLORIDE 0.9 % (FLUSH) 0.9 %
5-40 SYRINGE (ML) INJECTION PRN
Status: DISCONTINUED | OUTPATIENT
Start: 2025-03-20 | End: 2025-03-27 | Stop reason: HOSPADM

## 2025-03-20 RX ORDER — BUDESONIDE AND FORMOTEROL FUMARATE DIHYDRATE 160; 4.5 UG/1; UG/1
2 AEROSOL RESPIRATORY (INHALATION)
Status: DISCONTINUED | OUTPATIENT
Start: 2025-03-20 | End: 2025-03-27 | Stop reason: HOSPADM

## 2025-03-20 RX ORDER — POLYETHYLENE GLYCOL 3350 17 G/17G
17 POWDER, FOR SOLUTION ORAL DAILY PRN
Status: DISCONTINUED | OUTPATIENT
Start: 2025-03-20 | End: 2025-03-21

## 2025-03-20 RX ADMIN — CEFTRIAXONE SODIUM 1000 MG: 1 INJECTION, POWDER, FOR SOLUTION INTRAMUSCULAR; INTRAVENOUS at 19:39

## 2025-03-20 RX ADMIN — IPRATROPIUM BROMIDE AND ALBUTEROL SULFATE 1 DOSE: 2.5; .5 SOLUTION RESPIRATORY (INHALATION) at 22:14

## 2025-03-20 RX ADMIN — VERAPAMIL HYDROCHLORIDE 120 MG: 120 TABLET ORAL at 21:48

## 2025-03-20 RX ADMIN — HYDRALAZINE HYDROCHLORIDE 10 MG: 20 INJECTION INTRAMUSCULAR; INTRAVENOUS at 17:46

## 2025-03-20 RX ADMIN — ENOXAPARIN SODIUM 30 MG: 100 INJECTION SUBCUTANEOUS at 21:48

## 2025-03-20 RX ADMIN — SODIUM CHLORIDE 500 ML: 9 INJECTION, SOLUTION INTRAVENOUS at 14:01

## 2025-03-20 RX ADMIN — LORAZEPAM 0.5 MG: 0.5 TABLET ORAL at 15:02

## 2025-03-20 ASSESSMENT — PAIN SCALES - GENERAL: PAINLEVEL_OUTOF10: 0

## 2025-03-20 NOTE — H&P
History & Physical    Primary Care Provider: Tiki Coronado MD  Source of Information: Patient/family     Chief complaint:   Chief Complaint   Patient presents with    Altered Mental Status        History of Presenting Illness:   Lori Lambert is a 89 y.o. female with a past medical history as noted below who presents for confusion.    Patient was last seen in the ED on 3/17/2025.  At that time she had been experiencing some URI symptoms for several weeks.  She had finished a Z-Nik course 1 week prior but was still feeling somewhat short of breath.  Patient has been refusing to take her Breztri citing that she does not like to walk to the bathroom afterward to gargle her mouth.  Chest x-ray was performed and showed no pneumonia.  Patient was discharged with levofloxacin 500 mg daily for 5 days.  Patient has been at her baseline mental status until this morning when the daughter and granddaughter noted she seemed confused so they brought her in for further evaluation.  Patient knows what month it is but not her age.  She is following commands but needs to be redirected.  She is pleasant and does not seem to be in any distress.  WBC 13.1 slightly down from 14.53 days prior.  BUN/creatinine 48/1.27 slightly up from 27/1.1.  Urinalysis is bland.      ED COURSE  97.9 °F RR 20 HR 96 /76 SpO2 100% on 4 L nasal cannula  WBC 13.1  Platelets 472  BUN/creatinine 48/1.27 estimated GFR 40  Calcium 10.2  Albumin 2.9  VBG pH 7.41/pCO2 47.9  Urinalysis pending urine culture pending  Chest x-ray left upper lobe nodular opacity 22 mm  CT head no acute intracranial abnormality  Ativan 0.5 mg p.o. x 1  NS bolus 500 mL IV x 1      Review of Systems:  A comprehensive review of systems was negative except for that written in the History of Present Illness.     Past Medical History:   Diagnosis Date    Benign essential HTN     Bilateral hearing loss, unspecified hearing loss type     COPD, moderate (HCC)     Depression,  Unknown (comments)        Family History   Problem Relation Age of Onset    Stroke Mother     Heart Failure Father     Heart Failure Mother         Social History     Socioeconomic History    Marital status:    Tobacco Use    Smoking status: Never    Smokeless tobacco: Never   Vaping Use    Vaping status: Never Used   Substance and Sexual Activity    Alcohol use: Never    Drug use: Never    Sexual activity: Not Currently     Partners: Male     Social Drivers of Health     Financial Resource Strain: Low Risk  (11/20/2024)    Overall Financial Resource Strain (CARDIA)     Difficulty of Paying Living Expenses: Not hard at all   Food Insecurity: No Food Insecurity (1/14/2025)    Hunger Vital Sign     Worried About Running Out of Food in the Last Year: Never true     Ran Out of Food in the Last Year: Never true   Transportation Needs: No Transportation Needs (1/14/2025)    PRAPARE - Transportation     Lack of Transportation (Medical): No     Lack of Transportation (Non-Medical): No   Physical Activity: Inactive (1/4/2024)    Exercise Vital Sign     Days of Exercise per Week: 0 days     Minutes of Exercise per Session: 0 min   Housing Stability: Low Risk  (1/14/2025)    Housing Stability Vital Sign     Unable to Pay for Housing in the Last Year: No     Number of Times Moved in the Last Year: 0     Homeless in the Last Year: No            CODE STATUS:  DNR x   Full    Other      Objective:     Physical Exam:     BP (!) 205/79   Pulse 87   Temp 97.9 °F (36.6 °C) (Oral)   Resp 22   Ht 1.549 m (5' 1\")   Wt 56.2 kg (124 lb)   SpO2 100%   BMI 23.43 kg/m²  O2 Flow Rate (L/min): 4 L/min O2 Device: Nasal cannula    General:  Alert, cooperative, no distress, appears stated age.   Head:  Normocephalic, without obvious abnormality, atraumatic.   Eyes:  Conjunctivae/corneas clear. PERRL, EOMs intact.   Nose: Nares normal.  No drainage or sinus tenderness.   Throat: Lips, mucosa, and tongue normal.    Neck: Supple,  levofloxacin 500 mg daily for 5 days.  Patient has been at her baseline mental status until this morning when the daughter and granddaughter noted she seemed confused so they brought her in for further evaluation.  Patient knows what month it is but not her age.  She is following commands but needs to be redirected.  She is pleasant and does not seem to be in any distress.  WBC 13.1 slightly down from 14.53 days prior.  BUN/creatinine 48/1.27 slightly up from 27/1.1.  Urinalysis is bland.      ED COURSE  97.9 °F RR 20 HR 96 /76 SpO2 100% on 4 L nasal cannula  WBC 13.1  Platelets 472  BUN/creatinine 48/1.27 estimated GFR 40  Calcium 10.2  Albumin 2.9  VBG pH 7.41/pCO2 47.9  Urinalysis bland  Chest x-ray left upper lobe nodular opacity 22 mm  CT head no acute intracranial abnormality  Ativan 0.5 mg p.o. x 1  NS bolus 500 mL IV x 1    Acute metabolic encephalopathy  Leukocytosis  Chronic hypoxic respiratory failure  Hypertension  Hyperlipidemia  COPD  CKD 3  Mood disorder    NIHSS 1 (did not know age).  Not suspecting stroke at this time.  Acute metabolic encephalopathy more likely due to untreated underlying infection and levofloxacin side effect.  Discontinue levofloxacin.  Start ceftriaxone.  Obtain CT chest to evaluate for occult pneumonia.  CRP PCT pending.  Of note, patient's 3rd dose of levofloxacin was this morning.  If CT chest unrevealing and pt does not improve off of levofloxacin would pursue MRI brain.     Scheduled Meds:   sodium chloride flush  5-40 mL IntraVENous 2 times per day    enoxaparin  30 mg SubCUTAneous Daily    [START ON 3/21/2025] aspirin  81 mg Oral Daily    budesonide-formoterol  2 puff Inhalation BID RT    And    tiotropium  2 puff Inhalation Daily RT    [START ON 3/21/2025] pantoprazole  40 mg Oral QAM AC    verapamil  120 mg Oral 2 times per day    cefTRIAXone (ROCEPHIN) IV  1,000 mg IntraVENous Q24H     Continuous Infusions:   sodium chloride       PRN Meds:.hydrALAZINE, sodium

## 2025-03-20 NOTE — ED NOTES
Portable telemetry pack applied at this time; Spoke to Tashia from Tele.   Box: 7   From: ED 20   To: 578

## 2025-03-20 NOTE — ED NOTES
ED TO INPATIENT SBAR HANDOFF    Patient Name: Lori Lambert   Preferred Name: Lori  : 1932  89 y.o.   Family/Caregiver Present: no   Code Status Order: DNR  PO Status: NPO:No  Telemetry Order:   C-SSRS: Risk of Suicide: No Risk  Sitter no  na  Restraints:     Sepsis Risk Score      Situation  Chief Complaint   Patient presents with    Altered Mental Status     Brief Description of Patient's Condition: Acute confusion: urine is clean. Ambulatory with assistance at baseline. Aox4 baseline---today Aox1.    Mental Status: disoriented and alert  Arrived from:Home  Imaging:   XR CHEST PORTABLE   Final Result      Left upper lobe nodular opacity. Dedicated chest CT recommended for further   characterization..         Electronically signed by RODRIGO WHITLOCK      CT HEAD WO CONTRAST   Final Result      No acute intracranial abnormality.            Electronically signed by Nelson Cabrera      CT CHEST WO CONTRAST    (Results Pending)     Abnormal labs:   Abnormal Labs Reviewed   CBC WITH AUTO DIFFERENTIAL - Abnormal; Notable for the following components:       Result Value    WBC 13.1 (*)     RBC 3.18 (*)     Hemoglobin 7.9 (*)     Hematocrit 26.6 (*)     MCH 24.8 (*)     MCHC 29.7 (*)     RDW 16.0 (*)     Platelets 472 (*)     Neutrophils Absolute 9.21 (*)     Monocytes Absolute 1.26 (*)     Eosinophils Absolute 0.82 (*)     Immature Granulocytes Absolute 0.07 (*)     All other components within normal limits   COMPREHENSIVE METABOLIC PANEL - Abnormal; Notable for the following components:    BUN 48 (*)     Creatinine 1.27 (*)     BUN/Creatinine Ratio 38 (*)     Est, Glom Filt Rate 40 (*)     Calcium 10.2 (*)     Total Protein 6.3 (*)     Albumin 2.9 (*)     Albumin/Globulin Ratio 0.9 (*)     All other components within normal limits   VENOUS BLOOD GAS, POINT OF CARE - Abnormal; Notable for the following components:    HCO3, Venous 30.1 (*)     All other components within normal limits  time range)   sodium chloride flush 0.9 % injection 5-40 mL (has no administration in time range)   0.9 % sodium chloride infusion (has no administration in time range)   enoxaparin Sodium (LOVENOX) injection 30 mg (has no administration in time range)   ondansetron (ZOFRAN-ODT) disintegrating tablet 4 mg (has no administration in time range)     Or   ondansetron (ZOFRAN) injection 4 mg (has no administration in time range)   polyethylene glycol (GLYCOLAX) packet 17 g (has no administration in time range)   acetaminophen (TYLENOL) tablet 650 mg (has no administration in time range)     Or   acetaminophen (TYLENOL) suppository 650 mg (has no administration in time range)   aspirin EC tablet 81 mg (has no administration in time range)   budesonide-formoterol (SYMBICORT) 160-4.5 MCG/ACT inhaler 2 puff (has no administration in time range)     And   tiotropium (SPIRIVA RESPIMAT) 2.5 MCG/ACT inhaler 2 puff (has no administration in time range)   busPIRone (BUSPAR) tablet 5 mg (has no administration in time range)   pantoprazole (PROTONIX) tablet 40 mg (has no administration in time range)   verapamil (CALAN) tablet 120 mg (has no administration in time range)   cefTRIAXone (ROCEPHIN) 1,000 mg in sterile water 10 mL IV syringe (has no administration in time range)   sodium chloride 0.9 % bolus 500 mL (0 mLs IntraVENous Stopped 3/20/25 1428)   LORazepam (ATIVAN) tablet 0.5 mg (0.5 mg Oral Given 3/20/25 1502)   hydrALAZINE (APRESOLINE) injection 10 mg (10 mg IntraVENous Given 3/20/25 1746)     Last documented pain medication administration: na  Pertinent or High Risk Medications/Drips: no   If Yes, please provide details: na  Blood Product Administration: no  If Yes, please provide details: na  Process Protocols/Bundles: N/A    Recommendation  Incomplete STAT orders: na  Overdue Medications: na  Patient Belongings:    Additional Comments: na  If any further questions, please call Sending RN at [phone]      Admitting Unit  Notification  Name of person notified and time: clerk Ulises      Electronically signed by: Electronically signed by Froy Camacho RN on 3/20/2025 at 7:08 PM

## 2025-03-20 NOTE — ED TRIAGE NOTES
Pt arrives via EMS as level two stroke alert. Per EMS pt's daughter reported pt was altered this morning, pt only alert to self, LKW 2230 yesterday. Pt's daughter also reported to EMS that pt wears 4L nasal cannula at home and her oxygen machine turned off during the night.  en route. Dr Love at [facility] during triage, stroke alert cancelled at this time 1216.

## 2025-03-20 NOTE — ED PROVIDER NOTES
[facility] EMERGENCY DEPT  EMERGENCY DEPARTMENT HISTORY AND PHYSICAL EXAM      Date: 3/20/2025  Patient Name: Lori Lambert  MRN: [id number]  YOB: 1932  Date of evaluation: 3/20/2025  Provider: Dl Love DO   Note Started: 12:25 PM EDT 3/20/25    HISTORY OF PRESENT ILLNESS     Chief Complaint   Patient presents with    Altered Mental Status       History was provided by: Patient, EMS, and Adult Child    HPI:Patient is a 89-year-old female present with chief complaint of confusion.  Reportedly last night at bedtime at around 1030 she was her baseline, during the night daughter reportedly got up because the oxygen concentrator was not spine oxygen, and was alarming.  Reportedly this morning was not acting at her baseline only oriented to self.  No noted focal deficits.  The daughter noted to EMS that her balance actually seems better than normal.  Nobody noted any facial droop or extremity weakness.  EMS had raised concern for possible stroke however stroke alert was canceled on arrival.  There was described slurred speech this morning however that seems to have resolved.  Patient does not know why she is here    Her daughter later arrived at bedside for further history.  Patient was here a few days ago for similar symptoms and was discharged with Levaquin for possible pneumonia although x-ray was clear.  Daughter says she seemed to get back to normal until beginning late last night or this morning when symptoms seem to recur.          PAST MEDICAL HISTORY   Past Medical History:  Past Medical History:   Diagnosis Date    Benign essential HTN     Bilateral hearing loss, unspecified hearing loss type     COPD, moderate (HCC)     Depression, controlled     Elevated fasting blood sugar     Elevated serum creatinine     Hypercalcemia     Hypercholesteremia        Past Surgical History:  Past Surgical History:   Procedure Laterality Date    CHOLECYSTECTOMY  03/2016    Dr. Tineo    TUBAL LIGATION         Family  Absolute 1.26 (H) 0.00 - 1.00 K/UL    Eosinophils Absolute 0.82 (H) 0.00 - 0.40 K/UL    Basophils Absolute 0.04 0.00 - 0.10 K/UL    Immature Granulocytes Absolute 0.07 (H) 0.00 - 0.04 K/UL    Differential Type AUTOMATED     Comprehensive Metabolic Panel    Collection Time: 03/20/25 12:49 PM   Result Value Ref Range    Sodium 141 136 - 145 mmol/L    Potassium 4.3 3.5 - 5.1 mmol/L    Chloride 107 97 - 108 mmol/L    CO2 30 21 - 32 mmol/L    Anion Gap 4 2 - 12 mmol/L    Glucose 86 65 - 100 mg/dL    BUN 48 (H) 6 - 20 mg/dL    Creatinine 1.27 (H) 0.55 - 1.02 mg/dL    BUN/Creatinine Ratio 38 (H) 12 - 20      Est, Glom Filt Rate 40 (L) >60 ml/min/1.73m2    Calcium 10.2 (H) 8.5 - 10.1 mg/dL    Total Bilirubin 0.3 0.2 - 1.0 mg/dL    AST 19 15 - 37 U/L    ALT 17 12 - 78 U/L    Alk Phosphatase 96 45 - 117 U/L    Total Protein 6.3 (L) 6.4 - 8.2 g/dL    Albumin 2.9 (L) 3.5 - 5.0 g/dL    Globulin 3.4 2.0 - 4.0 g/dL    Albumin/Globulin Ratio 0.9 (L) 1.1 - 2.2     Protime-INR    Collection Time: 03/20/25 12:49 PM   Result Value Ref Range    Protime 14.2 11.9 - 14.6 sec    INR 1.1 0.9 - 1.1     Troponin    Collection Time: 03/20/25 12:49 PM   Result Value Ref Range    Troponin, High Sensitivity 14 0 - 51 ng/L   Magnesium    Collection Time: 03/20/25 12:49 PM   Result Value Ref Range    Magnesium 1.9 1.6 - 2.4 mg/dL   POCT Glucose    Collection Time: 03/20/25 12:54 PM   Result Value Ref Range    POC Glucose 91 65 - 100 mg/dL    Performed by: Angeles Garcia    Venous Blood Gas, POC    Collection Time: 03/20/25 12:54 PM   Result Value Ref Range    PH, VENOUS (POC) 7.41 7.23 - 7.44      PCO2, Mariam, POC 47.9 41.0 - 52.0 mmHg    PO2, VENOUS (POC) <27 25 - 40 mmHg    HCO3, Venous 30.1 (H) 22.0 - 26.0 mmol/L    BASE EXCESS, VENOUS (POC) 4.7 mmol/L    Specimen type: Venous      Performed by: Angeles Garcia     Critical Value Read Back MIKE    EKG 12 Lead    Collection Time: 03/20/25  2:13 PM   Result Value Ref Range    Ventricular Rate 73 BPM    busPIRone (BUSPAR) tablet 5 mg (has no administration in time range)   pantoprazole (PROTONIX) tablet 40 mg (40 mg Oral Given 3/21/25 5715)   verapamil (CALAN) tablet 120 mg (120 mg Oral Given 3/20/25 2148)   cefTRIAXone (ROCEPHIN) 1,000 mg in sterile water 10 mL IV syringe (1,000 mg IntraVENous Given 3/20/25 1939)   ipratropium 0.5 mg-albuterol 2.5 mg (DUONEB) nebulizer solution 1 Dose (1 Dose Inhalation Given 3/20/25 2214)   sodium chloride 0.9 % bolus 500 mL (0 mLs IntraVENous Stopped 3/20/25 1428)   LORazepam (ATIVAN) tablet 0.5 mg (0.5 mg Oral Given 3/20/25 1502)   hydrALAZINE (APRESOLINE) injection 10 mg (10 mg IntraVENous Given 3/20/25 1746)       CONSULTS: See ED Course/MDM for further details.  None     Social Determinants affecting Diagnosis/Treatment: None    Smoking Cessation: Not Applicable    PROCEDURES   Unless otherwise noted above, none.  Procedures      CRITICAL CARE TIME   There was a high probability of life-threatening clinical deterioration in the patient's condition requiring my urgent intervention.  I personally saw the patient and independently provided 31 minutes of non-concurrent critical care out of the total shared critical care time provided, excluding separately reportable procedures.     ED IMPRESSION     1. Altered mental status, unspecified altered mental status type    2. Left upper lobe pulmonary nodule          DISPOSITION/PLAN   DISPOSITION Admitted 03/20/2025 06:00:09 PM             Admit Note: Pt is being admitted by Hospitalist . The results of their tests and reason(s) for their admission have been discussed with pt and/or available family. They convey agreement and understanding for the need to be admitted and for the admission diagnosis.     PATIENT REFERRED TO:  No follow-up provider specified.      DISCHARGE MEDICATIONS:     Medication List        ASK your doctor about these medications      albuterol sulfate  (90 Base) MCG/ACT inhaler  Commonly known as:

## 2025-03-21 ENCOUNTER — TELEPHONE (OUTPATIENT)
Facility: CLINIC | Age: 89
End: 2025-03-21

## 2025-03-21 ENCOUNTER — APPOINTMENT (OUTPATIENT)
Facility: HOSPITAL | Age: 89
DRG: 177 | End: 2025-03-21
Payer: MEDICARE

## 2025-03-21 ENCOUNTER — HOSPITAL ENCOUNTER (INPATIENT)
Facility: HOSPITAL | Age: 89
Discharge: HOME | DRG: 177 | End: 2025-03-24
Payer: MEDICARE

## 2025-03-21 LAB
ALBUMIN SERPL-MCNC: 2.8 G/DL (ref 3.5–5)
ALBUMIN/GLOB SERPL: 0.8 (ref 1.1–2.2)
ALP SERPL-CCNC: 93 U/L (ref 45–117)
ALT SERPL-CCNC: 14 U/L (ref 12–78)
ANION GAP SERPL CALC-SCNC: 5 MMOL/L (ref 2–12)
AST SERPL W P-5'-P-CCNC: 16 U/L (ref 15–37)
BASOPHILS # BLD: 0.04 K/UL (ref 0–0.1)
BASOPHILS NFR BLD: 0.4 % (ref 0–1)
BILIRUB SERPL-MCNC: 0.3 MG/DL (ref 0.2–1)
BUN SERPL-MCNC: 35 MG/DL (ref 6–20)
BUN/CREAT SERPL: 30 (ref 12–20)
CA-I BLD-MCNC: 10.2 MG/DL (ref 8.5–10.1)
CHLORIDE SERPL-SCNC: 105 MMOL/L (ref 97–108)
CO2 SERPL-SCNC: 28 MMOL/L (ref 21–32)
CREAT SERPL-MCNC: 1.15 MG/DL (ref 0.55–1.02)
CRP SERPL-MCNC: 1.15 MG/DL (ref 0–0.3)
DIFFERENTIAL METHOD BLD: ABNORMAL
EOSINOPHIL # BLD: 0.62 K/UL (ref 0–0.4)
EOSINOPHIL NFR BLD: 6.1 % (ref 0–7)
ERYTHROCYTE [DISTWIDTH] IN BLOOD BY AUTOMATED COUNT: 16.2 % (ref 11.5–14.5)
GLOBULIN SER CALC-MCNC: 3.7 G/DL (ref 2–4)
GLUCOSE SERPL-MCNC: 122 MG/DL (ref 65–100)
HCT VFR BLD AUTO: 25.9 % (ref 35–47)
HGB BLD-MCNC: 7.9 G/DL (ref 11.5–16)
IMM GRANULOCYTES # BLD AUTO: 0.07 K/UL (ref 0–0.04)
IMM GRANULOCYTES NFR BLD AUTO: 0.7 % (ref 0–0.5)
LYMPHOCYTES # BLD: 1.15 K/UL (ref 0.8–3.5)
LYMPHOCYTES NFR BLD: 11.2 % (ref 12–49)
MAGNESIUM SERPL-MCNC: 1.9 MG/DL (ref 1.6–2.4)
MCH RBC QN AUTO: 24.8 PG (ref 26–34)
MCHC RBC AUTO-ENTMCNC: 30.5 G/DL (ref 30–36.5)
MCV RBC AUTO: 81.2 FL (ref 80–99)
MONOCYTES # BLD: 1.11 K/UL (ref 0–1)
MONOCYTES NFR BLD: 10.9 % (ref 5–13)
NEUTS SEG # BLD: 7.24 K/UL (ref 1.8–8)
NEUTS SEG NFR BLD: 70.7 % (ref 32–75)
NRBC # BLD: 0 K/UL (ref 0–0.01)
NRBC BLD-RTO: 0 PER 100 WBC
PLATELET # BLD AUTO: 459 K/UL (ref 150–400)
PMV BLD AUTO: 10.4 FL (ref 8.9–12.9)
POTASSIUM SERPL-SCNC: 4.1 MMOL/L (ref 3.5–5.1)
PROCALCITONIN SERPL-MCNC: <0.05 NG/ML
PROT SERPL-MCNC: 6.5 G/DL (ref 6.4–8.2)
RBC # BLD AUTO: 3.19 M/UL (ref 3.8–5.2)
SODIUM SERPL-SCNC: 138 MMOL/L (ref 136–145)
WBC # BLD AUTO: 10.2 K/UL (ref 3.6–11)

## 2025-03-21 PROCEDURE — 6370000000 HC RX 637 (ALT 250 FOR IP): Performed by: INTERNAL MEDICINE

## 2025-03-21 PROCEDURE — 1100000000 HC RM PRIVATE

## 2025-03-21 PROCEDURE — 2580000003 HC RX 258: Performed by: INTERNAL MEDICINE

## 2025-03-21 PROCEDURE — 2500000003 HC RX 250 WO HCPCS: Performed by: INTERNAL MEDICINE

## 2025-03-21 PROCEDURE — 97530 THERAPEUTIC ACTIVITIES: CPT

## 2025-03-21 PROCEDURE — 36415 COLL VENOUS BLD VENIPUNCTURE: CPT

## 2025-03-21 PROCEDURE — 92611 MOTION FLUOROSCOPY/SWALLOW: CPT

## 2025-03-21 PROCEDURE — 86140 C-REACTIVE PROTEIN: CPT

## 2025-03-21 PROCEDURE — 94761 N-INVAS EAR/PLS OXIMETRY MLT: CPT

## 2025-03-21 PROCEDURE — 6360000002 HC RX W HCPCS: Performed by: INTERNAL MEDICINE

## 2025-03-21 PROCEDURE — 97163 PT EVAL HIGH COMPLEX 45 MIN: CPT

## 2025-03-21 PROCEDURE — 94640 AIRWAY INHALATION TREATMENT: CPT

## 2025-03-21 PROCEDURE — 74230 X-RAY XM SWLNG FUNCJ C+: CPT

## 2025-03-21 PROCEDURE — 83735 ASSAY OF MAGNESIUM: CPT

## 2025-03-21 PROCEDURE — 84145 PROCALCITONIN (PCT): CPT

## 2025-03-21 PROCEDURE — 80053 COMPREHEN METABOLIC PANEL: CPT

## 2025-03-21 PROCEDURE — 92610 EVALUATE SWALLOWING FUNCTION: CPT

## 2025-03-21 PROCEDURE — 2700000000 HC OXYGEN THERAPY PER DAY

## 2025-03-21 PROCEDURE — 85025 COMPLETE CBC W/AUTO DIFF WBC: CPT

## 2025-03-21 RX ORDER — LORAZEPAM 1 MG/1
1 TABLET ORAL ONCE
Status: COMPLETED | OUTPATIENT
Start: 2025-03-21 | End: 2025-03-21

## 2025-03-21 RX ORDER — ATORVASTATIN CALCIUM 20 MG/1
10 TABLET, FILM COATED ORAL DAILY
Status: DISCONTINUED | OUTPATIENT
Start: 2025-03-21 | End: 2025-03-27 | Stop reason: HOSPADM

## 2025-03-21 RX ORDER — SODIUM CHLORIDE 0.9 % (FLUSH) 0.9 %
5-40 SYRINGE (ML) INJECTION EVERY 12 HOURS SCHEDULED
Status: DISCONTINUED | OUTPATIENT
Start: 2025-03-21 | End: 2025-03-27 | Stop reason: HOSPADM

## 2025-03-21 RX ORDER — ONDANSETRON 4 MG/1
4 TABLET, ORALLY DISINTEGRATING ORAL EVERY 8 HOURS PRN
Status: DISCONTINUED | OUTPATIENT
Start: 2025-03-21 | End: 2025-03-27 | Stop reason: HOSPADM

## 2025-03-21 RX ORDER — POLYETHYLENE GLYCOL 3350 17 G/17G
17 POWDER, FOR SOLUTION ORAL DAILY PRN
Status: DISCONTINUED | OUTPATIENT
Start: 2025-03-21 | End: 2025-03-27 | Stop reason: HOSPADM

## 2025-03-21 RX ORDER — SODIUM CHLORIDE 9 MG/ML
INJECTION, SOLUTION INTRAVENOUS PRN
Status: DISCONTINUED | OUTPATIENT
Start: 2025-03-21 | End: 2025-03-27 | Stop reason: HOSPADM

## 2025-03-21 RX ORDER — SODIUM CHLORIDE 0.9 % (FLUSH) 0.9 %
5-40 SYRINGE (ML) INJECTION PRN
Status: DISCONTINUED | OUTPATIENT
Start: 2025-03-21 | End: 2025-03-27 | Stop reason: HOSPADM

## 2025-03-21 RX ORDER — ONDANSETRON 2 MG/ML
4 INJECTION INTRAMUSCULAR; INTRAVENOUS EVERY 6 HOURS PRN
Status: DISCONTINUED | OUTPATIENT
Start: 2025-03-21 | End: 2025-03-27 | Stop reason: HOSPADM

## 2025-03-21 RX ORDER — IPRATROPIUM BROMIDE AND ALBUTEROL SULFATE 2.5; .5 MG/3ML; MG/3ML
1 SOLUTION RESPIRATORY (INHALATION)
Status: DISCONTINUED | OUTPATIENT
Start: 2025-03-21 | End: 2025-03-27 | Stop reason: HOSPADM

## 2025-03-21 RX ORDER — ACETYLCYSTEINE 200 MG/ML
600 SOLUTION ORAL; RESPIRATORY (INHALATION)
Status: DISCONTINUED | OUTPATIENT
Start: 2025-03-21 | End: 2025-03-27 | Stop reason: HOSPADM

## 2025-03-21 RX ORDER — CLOTRIMAZOLE AND BETAMETHASONE DIPROPIONATE 10; .64 MG/G; MG/G
1 CREAM TOPICAL 2 TIMES DAILY PRN
Status: DISCONTINUED | OUTPATIENT
Start: 2025-03-21 | End: 2025-03-27 | Stop reason: HOSPADM

## 2025-03-21 RX ADMIN — LORAZEPAM 1 MG: 1 TABLET ORAL at 13:06

## 2025-03-21 RX ADMIN — TIOTROPIUM BROMIDE INHALATION SPRAY 2 PUFF: 3.12 SPRAY, METERED RESPIRATORY (INHALATION) at 09:57

## 2025-03-21 RX ADMIN — SODIUM CHLORIDE, PRESERVATIVE FREE 10 ML: 5 INJECTION INTRAVENOUS at 21:41

## 2025-03-21 RX ADMIN — Medication 2 PUFF: at 09:57

## 2025-03-21 RX ADMIN — Medication 2 PUFF: at 20:25

## 2025-03-21 RX ADMIN — ENOXAPARIN SODIUM 30 MG: 100 INJECTION SUBCUTANEOUS at 10:18

## 2025-03-21 RX ADMIN — MEROPENEM 1000 MG: 1 INJECTION INTRAVENOUS at 14:48

## 2025-03-21 RX ADMIN — PANTOPRAZOLE SODIUM 40 MG: 40 TABLET, DELAYED RELEASE ORAL at 05:55

## 2025-03-21 RX ADMIN — VERAPAMIL HYDROCHLORIDE 120 MG: 120 TABLET ORAL at 21:40

## 2025-03-21 RX ADMIN — ASPIRIN 81 MG: 81 TABLET, COATED ORAL at 10:19

## 2025-03-21 RX ADMIN — VERAPAMIL HYDROCHLORIDE 120 MG: 120 TABLET ORAL at 10:19

## 2025-03-21 RX ADMIN — ACETYLCYSTEINE 600 MG: 200 SOLUTION ORAL; RESPIRATORY (INHALATION) at 20:17

## 2025-03-21 RX ADMIN — SODIUM CHLORIDE, PRESERVATIVE FREE 10 ML: 5 INJECTION INTRAVENOUS at 10:42

## 2025-03-21 RX ADMIN — AZITHROMYCIN MONOHYDRATE 500 MG: 500 INJECTION, POWDER, LYOPHILIZED, FOR SOLUTION INTRAVENOUS at 10:40

## 2025-03-21 RX ADMIN — IPRATROPIUM BROMIDE AND ALBUTEROL SULFATE 1 DOSE: 2.5; .5 SOLUTION RESPIRATORY (INHALATION) at 14:58

## 2025-03-21 RX ADMIN — BARIUM SULFATE 5 ML: 400 SUSPENSION ORAL at 14:20

## 2025-03-21 RX ADMIN — IPRATROPIUM BROMIDE AND ALBUTEROL SULFATE 1 DOSE: 2.5; .5 SOLUTION RESPIRATORY (INHALATION) at 20:17

## 2025-03-21 RX ADMIN — BARIUM SULFATE 50 ML: 0.81 POWDER, FOR SUSPENSION ORAL at 14:20

## 2025-03-21 RX ADMIN — BARIUM SULFATE 10 ML: 400 PASTE ORAL at 14:20

## 2025-03-21 RX ADMIN — ATORVASTATIN CALCIUM 10 MG: 20 TABLET, FILM COATED ORAL at 21:40

## 2025-03-21 ASSESSMENT — PAIN SCALES - GENERAL
PAINLEVEL_OUTOF10: 0

## 2025-03-21 NOTE — PROGRESS NOTES
Comprehensive Nutrition Assessment    Type and Reason for Visit:  Initial, Positive nutrition screen    Nutrition Recommendations/Plan:   Continue Minced and Moist diet or per SLP recs  Noted [place] Thick Liquids modification  Added Frozen ONS BID to supplement kcal/protein intake      Malnutrition Assessment:  Malnutrition Status:  At risk for malnutrition (03/21/25 1711)    Context:  Acute Illness     Findings of the 6 clinical characteristics of malnutrition:  Energy Intake:  Mild decrease in energy intake  Weight Loss:  No weight loss (Comp weight hx endorses stable weight within ~3 months; Significant weight loss >12mo not significant for malnutrition)     Body Fat Loss:  Unable to assess (NFPE deferred to followup)     Muscle Mass Loss:  Unable to assess    Fluid Accumulation:  Unable to assess     Strength:  Not Performed    Nutrition Assessment:    Pt admitted for acute metabolic encephalopathy; RD screened for MST 2 (weight loss 2-13lb and decrease appetite); inpatient assessment for AMS; aphasia; pneumonia; bilateral lung masses; Hx CKD III - baseline creat. Ordered Nepro BID as above to supplement kcal protein intake.    Nutrition Related Findings:    AAOx0; GCS 14; + glasses; deaf; + 1 edema to Lowe extremities; + weakness; Labs: BUN 35, Creat 1.15, glucose 120, Hba1c 5.9; Meds: Zithromax, symbicort, lovenox, Ativan, protonix Wound Type: None       Current Nutrition Intake & Therapies:    Average Meal Intake: Unable to assess  Average Supplements Intake: None Ordered  ADULT DIET; Dysphagia - Minced and Moist; Mildly Thick (Nectar)    Anthropometric Measures:  Height: 154.9 cm (5' 0.98\")  Ideal Body Weight (IBW): 105 lbs (48 kg)    Admission Body Weight: 56.2 kg (123 lb 14.4 oz)  Current Body Weight: 56.2 kg (123 lb 14.4 oz), 118 % IBW. Weight Source: Stated  Current BMI (kg/m2): 23.4  Usual Body Weight: 56 kg (123 lb 7.3 oz)  % Weight Change (Calculated): 0.4  Weight Adjustment For: No  Adjustment  BMI Categories: Normal Weight (BMI 18.5-24.9)    Estimated Daily Nutrient Needs:  Energy Requirements Based On: Formula  Weight Used for Energy Requirements: Current  Energy (kcal/day): MSJ x 1.2 = 2208-0622 kcal/day  Weight Used for Protein Requirements: Current  Protein (g/day): 0.8-1.2 g pro/kg = 40-70  Method Used for Fluid Requirements: 1 ml/kcal  Fluid (ml/day): 1000ml/day or MD res    Nutrition Diagnosis:   Inadequate oral intake related to cognitive or neurological impairment as evidenced by poor intake prior to admission    Nutrition Interventions:   Food and/or Nutrient Delivery: Continue Current Diet, Start Oral Nutrition Supplement  Nutrition Education/Counseling: Education/Counseling not indicated  Coordination of Nutrition Care: Continue to monitor while inpatient       Goals:  Goals: Meet at least 75% of estimated needs, within 7 days, by next RD assessment  Type of Goal: New goal  Previous Goal Met: New Goal    Nutrition Monitoring and Evaluation:   Behavioral-Environmental Outcomes: Beliefs and Attitudes  Food/Nutrient Intake Outcomes: Food and Nutrient Intake, Supplement Intake  Physical Signs/Symptoms Outcomes: Biochemical Data, Nutrition Focused Physical Findings, Skin, Weight, GI Status, Fluid Status or Edema    Discharge Planning:    Continue Oral Nutrition Supplement, Continue current diet     Shantal Esquivel RD  Contact: [phone]

## 2025-03-21 NOTE — PLAN OF CARE
Problem: Discharge Planning  Goal: Discharge to home or other facility with appropriate resources  Outcome: Progressing     Problem: Skin/Tissue Integrity  Goal: Skin integrity remains intact  Description: 1.  Monitor for areas of redness and/or skin breakdown  2.  Assess vascular access sites hourly  3.  Every 4-6 hours minimum:  Change oxygen saturation probe site  4.  Every 4-6 hours:  If on nasal continuous positive airway pressure, respiratory therapy assess nares and determine need for appliance change or resting period  Outcome: Progressing  Flowsheets (Taken 3/21/2025 1541)  Skin Integrity Remains Intact: Monitor for areas of redness and/or skin breakdown     Problem: Safety - Adult  Goal: Free from fall injury  Outcome: Progressing     Problem: ABCDS Injury Assessment  Goal: Absence of physical injury  Outcome: Progressing     Problem: SLP Adult - Impaired Swallowing  Goal: By Discharge: Advance to least restrictive diet without signs or symptoms of aspiration for planned discharge setting.  See evaluation for individualized goals.  Description: Speech Therapy Swallow Goals  Initiated 3/21/25  -Patient will tolerate MM diet with mildly thick liquids without clinical indicators of aspiration given mod cues within 7 day(s).        -Patient will tolerate PO trials without clinical indicators of aspiration given mod cues within 7 day(s).      -Patient will participate in modified barium swallow study within 7 day(s).      -Patient will demonstrate understanding of swallow safety precautions and aspiration precautions, diet recs with mod cues within 7day(s).    -Patient/caregiver goal: to swallow safely    3/21/2025 1435 by Samantha Greco, SLP  Outcome: Progressing  3/21/2025 1137 by Samantha Greco, SLP  Outcome: Progressing

## 2025-03-21 NOTE — PROGRESS NOTES
4 Eyes Skin Assessment     NAME:  Lori Lambert  YOB: 1932  MEDICAL RECORD NUMBER:  [id number]    The patient is being assessed for  Admission    I agree that at least one RN has performed a thorough Head to Toe Skin Assessment on the patient. ALL assessment sites listed below have been assessed.      Areas assessed by both nurses:    Head, Face, Ears, Shoulders, Back, Chest, Arms, Elbows, Hands, Sacrum. Buttock, Coccyx, Ischium, Legs. Feet and Heels, and Under Medical Devices         Does the Patient have a Wound? No noted wound(s) Patient has some redness to her buttocks and sacrum       Jermaine Prevention initiated by RN: Yes  Wound Care Orders initiated by RN: No    Pressure Injury (Stage 3,4, Unstageable, DTI, NWPT, and Complex wounds) if present, place Wound referral order by RN under : No    New Ostomies, if present place, Ostomy referral order under : No     Nurse 1 eSignature: Electronically signed by Monique Mendoza RN on 3/21/25 at 2:12 AM EDT    **SHARE this note so that the co-signing nurse can place an eSignature**    Nurse 2 eSignature: {Esignature:[id number]}

## 2025-03-21 NOTE — PLAN OF CARE
SPEECH PATHOLOGY MODIFIED BARIUM SWALLOW STUDY  Patient: Lori Lambert (89 y.o. female)  Date: 3/21/2025  Primary Diagnosis: Altered mental status, unspecified altered mental status type [R41.82]  Acute metabolic encephalopathy [G93.41]  Left upper lobe pulmonary nodule [R91.1]       Precautions: aspiration; GERD                     DIET RECOMMENDATIONS:Minced and moist and mildly thick liquids    SWALLOW SAFETY PRECAUTIONS: 1:1 assistance with ALL PO intake, STRICT aspiration and GERD precautions, monitor pt closely for s/s aspiration, meds crushed if able in applesauce or pudding, FEED ONLY IF AWAKE AND ALERT.      ASSESSMENT :  Based on the objective data described below, the patient presents with mild oropharyngeal dysphagia negatively impacted by alertness and bolus control w/ underlying esophageal dysphagia. Penetration w/o aspiration of thin observed. Esophageal retention w/ retrograde flow. Patient at risk for aspiration and reverse aspiration.     Oral phase: Reduced bolus awareness and acceptance. Multiple verbal cues needed to alert patient to presentation. Increased time in oral transit w/ thicker consistencies. Bolus holding w/ solid and pudding requiring verbal cues to swallow. Premature spillage over base of tongue to the pyriforms where swallow is initiated. Delay in the initiation of swallow. Reduced tongue base retraction and lingual pumping.     Pharyngeal phase: once swallow initiation, hyolaryngeal excursion and protraction adequate. Epiglottic inversion intact.   No penetration or aspiration observed w/ tsp thin.   Penetration present w/ consecutive swallows via straw. Penetration appears to clear the laryngeal vestibule w/ increased pyriform residue.   Trace penetration during the swallow of mildly thick.   No penetration or aspiration observed w/ pudding or soft solid.     UES: decreased duration and relaxation of this segment. There is a large osteophyte present C5. Cricopharyngeal

## 2025-03-21 NOTE — PROGRESS NOTES
Herbal and Nutritional Product Restrictions      The following herbal, alternative, and/or nutritional/dietary supplement product(s) has been discontinued per [facility] approved policy:    turmeric    Please reorder upon discharge if appropriate.    Thank you,  Zackery Franklin [facility]  3/21/2025 9:59 AM

## 2025-03-21 NOTE — CONSULTS
Gastroenterology Consult Note        Patient: Lori Lambert MRN: [id number]  SSN: [id number]    YOB: 1932  Age: 89 y.o.  Sex: female      Subjective:      Lori Lambert is a 89 y.o. female who is being seen for difficult swallow, malnutrition.    History from medical records and from the daughter.  89 years old lady report from the family home to the emergency room due to the weakness, patient had shortness of breath has been treated for the pneumonia last couple days now the patient had mental status change with agitation is with the poor p.o. intake and patient brought emergency room evaluation where the patient was found to have leukocytosis with respiratory failure patient was admitted to the hospital for evaluation, report the patient had poor p.o. intake and, has been evaluated by the speech therapy.  She was determined to be high risk for aspiration with GERD precautions, the chest CT showed the possibility of pulmonary primary malignancy versus trach esophageal debris, had modified barium studies, which showed retained.  Esophagus, were about the possibility of esophagus obstruction, therefore GI consultation placed.  Patient now  is sleeping, with poor appetite, not eating the whole day.  Past Medical History:   Diagnosis Date    Benign essential HTN     Bilateral hearing loss, unspecified hearing loss type     COPD, moderate (HCC)     Depression, controlled     Elevated fasting blood sugar     Elevated serum creatinine     Hypercalcemia     Hypercholesteremia      Past Surgical History:   Procedure Laterality Date    CHOLECYSTECTOMY  03/2016    Dr. Tineo    TUBAL LIGATION        Family History   Problem Relation Age of Onset    Stroke Mother     Heart Failure Father     Heart Failure Mother      Social History     Tobacco Use    Smoking status: Never    Smokeless tobacco: Never   Substance Use Topics    Alcohol use: Never      Current Facility-Administered  (from the past 24 hours)   Urinalysis with Microscopic    Collection Time: 03/20/25  5:39 PM   Result Value Ref Range    Color, UA Yellow/Straw      Appearance Clear Clear      Specific Gravity, UA 1.013 1.003 - 1.030      pH, Urine 6.0 5.0 - 8.0      Protein, UA Negative Negative mg/dL    Glucose, Ur Negative Negative mg/dL    Ketones, Urine Negative Negative mg/dL    Bilirubin, Urine Negative Negative      Blood, Urine Negative Negative      Urobilinogen, Urine 0.1 0.1 - 1.0 EU/dL    Nitrite, Urine Negative Negative      Leukocyte Esterase, Urine Negative Negative      WBC, UA 0-4 0 - 4 /hpf    RBC, UA 0-5 0 - 5 /hpf    Epithelial Cells, UA Few Few /lpf    BACTERIA, URINE Negative Negative /hpf   CBC with Auto Differential    Collection Time: 03/21/25  5:39 AM   Result Value Ref Range    WBC 10.2 3.6 - 11.0 K/uL    RBC 3.19 (L) 3.80 - 5.20 M/uL    Hemoglobin 7.9 (L) 11.5 - 16.0 g/dL    Hematocrit 25.9 (L) 35.0 - 47.0 %    MCV 81.2 80.0 - 99.0 FL    MCH 24.8 (L) 26.0 - 34.0 PG    MCHC 30.5 30.0 - 36.5 g/dL    RDW 16.2 (H) 11.5 - 14.5 %    Platelets 459 (H) 150 - 400 K/uL    MPV 10.4 8.9 - 12.9 FL    Nucleated RBCs 0.0 0.0  WBC    nRBC 0.00 0.00 - 0.01 K/uL    Neutrophils % 70.7 32.0 - 75.0 %    Lymphocytes % 11.2 (L) 12.0 - 49.0 %    Monocytes % 10.9 5.0 - 13.0 %    Eosinophils % 6.1 0.0 - 7.0 %    Basophils % 0.4 0.0 - 1.0 %    Immature Granulocytes % 0.7 (H) 0 - 0.5 %    Neutrophils Absolute 7.24 1.80 - 8.00 K/UL    Lymphocytes Absolute 1.15 0.80 - 3.50 K/UL    Monocytes Absolute 1.11 (H) 0.00 - 1.00 K/UL    Eosinophils Absolute 0.62 (H) 0.00 - 0.40 K/UL    Basophils Absolute 0.04 0.00 - 0.10 K/UL    Immature Granulocytes Absolute 0.07 (H) 0.00 - 0.04 K/UL    Differential Type AUTOMATED     Comprehensive Metabolic Panel    Collection Time: 03/21/25  5:39 AM   Result Value Ref Range    Sodium 138 136 - 145 mmol/L    Potassium 4.1 3.5 - 5.1 mmol/L    Chloride 105 97 - 108 mmol/L    CO2 28 21 - 32 mmol/L     physician and the primary gastroenterologist regarding further follow-up plan after patient be discharged.    note to patient:  The 21 st century Cures Act make the medical notes like this available to patient in the interest of transparency, however please be advised this is a medical document.  It is intended  as peer to peer communication. It is written in the medical language and may contain abbreviation or verbiage that are unfamiliar. It may appear blunt or direct.  Medical documents are intended to carry relevant information, facts as evident.  And the clinic opinions of the practitioner.  This note maybe transcribed  using a voice dictation system, voice-recognition errors may occur, this should not be taken to alter the contents or meaning for this note.      Cc Referring Physician

## 2025-03-21 NOTE — PLAN OF CARE
Speech LAnguage Pathology Dysphagia EVALUATION    Patient: Lori Lambert (89 y.o. female)  Date: 3/21/2025  Primary Diagnosis: Altered mental status, unspecified altered mental status type [R41.82]  Acute metabolic encephalopathy [G93.41]  Left upper lobe pulmonary nodule [R91.1]       Precautions: aspiration                    Time In: 1039  Time Out: 1049    DIET RECOMMENDATIONS: Minced and moist and mildly thick liquids    SWALLOW SAFETY PRECAUTIONS: 1:1 assistance with ALL PO intake, STRICT aspiration and GERD precautions, monitor pt closely for s/s aspiration, meds crushed if able in applesauce or pudding, FEED ONLY IF AWAKE AND ALERT.      ASSESSMENT :  Based on the objective data described below, the patient presents with mild-moderate oropharyngeal dysphagia w/ concerns for aspiration.     Daughter present in room and reports AMS. Patient consumed a puree/ mech soft diet and thin liquids at baseline. Daughter does report coughing w/ eating and drinking at home.   Chest CT results :   LUNGS: Left upper lobe nodule measuring 2.8 x 2.4 cm with adjacent satellite  micronodules. Nodules at the left base measure up to 9 mm. Right apical  consolidation along with lingular groundglass opacities. Emphysematous changes  of the lungs.   AIRWAYS: Tracheal debris versus nodule.    Patient w/ audible wheeze on exhalation. She is on O2 via NC. She is [place]. Can follow simple oral motor commands and answer simple questions. Patient observed w/ breakfast sandwich cut up into bite size w/ thin liquids. Patient is edentulous. Prolonged mastication w/ intermittent bolus holding. Pharyngeal response adequate for solids. No overt s/sx of aspiration. Administered thin liquids via straw and patient ingested w/ consecutive swallows. Swallow initiated w/ mild delay, pharyngeal response adequate to digital palpation and no overt s/sx of aspiration observed.   Patient did exhibit a strong cough response following medications whole  understanding.    The patient's plan of care including recommendations, planned interventions, and recommended diet changes were discussed with: Registered nurse and Physician.    Patient/family have participated as able in goal setting and plan of care    Thank you,  Samantha Greco, SLP  Minutes: 10

## 2025-03-21 NOTE — CONSULTS
IMPRESSION:   Acute hypoxic respiratory failure  Chronic hypoxic respiratory failure  J44.9 Chronic obstructive pulmonary disease, unspecified   I10 Essential (primary) hypertension   M54.9 Dorsalgia, unspecified   J06.9 Acute upper respiratory infection, unspecified   J96.10 Chronic respiratory failure, unspecified whether with hypoxia or hypercapnia   K21.9 Gastro-esophageal reflux disease without esophagitis   J18.9 Pneumonia, unspecified organism  Left upper lobe lung nodule rule out pneumonia versus mass  Additional workup outlined below  Pt is at high risk of sudden decline and decompensation with life threatening consequenses and continued end organ dysfunction and failure  Pt is critically ill. Time spent with pt and staff actively rendering care, managing pt and coordinating care as stated below;   minutes, exclusive of any procedures      RECOMMENDATIONS/PLAN:     50-year-old lady followed with me regularly regarding her COPD status came in because of shortness of breath altered mental status confusion she was brought to the emergency room and discharged treated for pneumonia with Levaquin but her condition got worse she got more confused unable to recognize anyone came back to the hospital she was treated for pneumonia left upper lobe previously last year in late November and CAT scan shows left upper lobe infiltrate she did not have any lung mass now she was more confused family brought her to the hospital she does recognize me and she is getting better but now she is on oxygen she is chronically on home oxygen 2 L nasal cannula and also she is on [employer] portable oxygen and she was supposed to come and visit me to repeat chest x-ray now the CAT scan shows left upper lobe density nodule probably infection and also Deprizine the trachea most likely mucous now she is on 4 L nasal cannula  CAT scan of the chest done on this admission shows left upper lobe nodular density and also has left lower lobe 9 mm  bronchodilators  Pt needs IV fluids with additives and Drug therapy requiring intensive monitoring for toxicity  Prescription drug management with home med reconciliation reviewed  DVT, SUP prophylaxis     [x] High complexity decision making was performed  [x] See my orders for details    PMH:  has a past medical history of Benign essential HTN, Bilateral hearing loss, unspecified hearing loss type, COPD, moderate (HCC), Depression, controlled, Elevated fasting blood sugar, Elevated serum creatinine, Hypercalcemia, and Hypercholesteremia.    PSH:   has a past surgical history that includes Cholecystectomy (03/2016) and Tubal ligation.     FHX: family history includes Heart Failure in her father and mother; Stroke in her mother.     SHX:  reports that she has never smoked. She has never used smokeless tobacco. She reports that she does not drink alcohol and does not use drugs.    ALL:   Allergies   Allergen Reactions    Penicillins      Other reaction(s): Unknown (comments)        MEDS:   [x] Reviewed - As Below   [] Not reviewed    Current Facility-Administered Medications   Medication Dose Route Frequency Provider Last Rate Last Admin    clotrimazole-betamethasone (LOTRISONE) cream 1 each  1 each Topical BID PRN Ольга Ingram MD        atorvastatin (LIPITOR) tablet 10 mg  10 mg Oral Daily Ольга Ingram MD        azithromycin (ZITHROMAX) 500 mg in sodium chloride 0.9 % 250 mL IVPB ([id number])  500 mg IntraVENous Q24H Ольга Ingram  mL/hr at 03/21/25 1040 500 mg at 03/21/25 1040    sodium chloride flush 0.9 % injection 5-40 mL  5-40 mL IntraVENous 2 times per day Ольга Ingram MD        sodium chloride flush 0.9 % injection 5-40 mL  5-40 mL IntraVENous PRN Ольга Ingram MD        0.9 % sodium chloride infusion   IntraVENous PRN Ольга Ingram MD        ondansetron (ZOFRAN-ODT) disintegrating tablet 4 mg  4 mg Oral Q8H PRN Nataly,  as needed    MEDS:   Current Facility-Administered Medications   Medication Dose Route Frequency Provider Last Rate Last Admin    clotrimazole-betamethasone (LOTRISONE) cream 1 each  1 each Topical BID PRN Ольга Ingram MD        atorvastatin (LIPITOR) tablet 10 mg  10 mg Oral Daily Ольга Ingram MD        azithromycin (ZITHROMAX) 500 mg in sodium chloride 0.9 % 250 mL IVPB ([id number])  500 mg IntraVENous Q24H Ольга Ingram  mL/hr at 03/21/25 1040 500 mg at 03/21/25 1040    sodium chloride flush 0.9 % injection 5-40 mL  5-40 mL IntraVENous 2 times per day Ольга Ingram MD        sodium chloride flush 0.9 % injection 5-40 mL  5-40 mL IntraVENous PRN Ольга Ingram MD        0.9 % sodium chloride infusion   IntraVENous PRN Ольга Ingram MD        ondansetron (ZOFRAN-ODT) disintegrating tablet 4 mg  4 mg Oral Q8H PRN Ольга Ingram MD        Or    ondansetron (ZOFRAN) injection 4 mg  4 mg IntraVENous Q6H PRN Ольга Ingram MD        polyethylene glycol (GLYCOLAX) packet 17 g  17 g Oral Daily PRN Ольга Ingram MD        meropenem (MERREM) 1,000 mg in sodium chloride 0.9 % 100 mL IVPB (addEASE)  1,000 mg IntraVENous Once Josiah Martin MD        Followed by    meropenem (MERREM) 500 mg in sodium chloride 0.9 % 100 mL IVPB (addEASE)  500 mg IntraVENous Q12H Josiah Martin MD        hydrALAZINE (APRESOLINE) injection 20 mg  20 mg IntraVENous Q6H PRN Ольга Ingram MD        sodium chloride flush 0.9 % injection 5-40 mL  5-40 mL IntraVENous 2 times per day Ольга Ingram MD   10 mL at 03/21/25 1042    sodium chloride flush 0.9 % injection 5-40 mL  5-40 mL IntraVENous PRN Ольга Ingram MD        0.9 % sodium chloride infusion   IntraVENous PRN Ольга Ingram MD        enoxaparin Sodium (LOVENOX) injection 30 mg  30 mg SubCUTAneous Daily  RODRIGO WHITLOCK    CT HEAD WO CONTRAST  Result Date: 3/20/2025  EXAM:  CT HEAD WO CONTRAST INDICATION: Altered mental status COMPARISON: None TECHNIQUE: Noncontrast head CT. Coronal and sagittal reformats. CT dose reduction was achieved through use of a standardized protocol tailored for this examination and automatic exposure control for dose modulation. FINDINGS: The ventricles and sulci are age-appropriate without hydrocephalus. There is no mass effect or midline shift. There is no intracranial hemorrhage or extra-axial fluid collection. There is no abnormal area of decreased density to suggest infarct. There is age-indeterminate microvascular ischemic change in the periventricular white matter. The basal cisterns are patent. The osseous structures are intact. The visualized paranasal sinuses and mastoid air cells are clear.     No acute intracranial abnormality. Electronically signed by Nelson Cabrera       This care involved high complexity decision making which includes independently reviewing the patient's past medical records, current laboratory results, medication profiles that were immediately available to me and actual Xray images at the bedside in order to assess, support vital system function, and to treat this degree of vital organ system failure, and to prevent further life threatening deterioration of the patient’s condition.    Medical Decision Making Today  Reviewed the flowsheet and previous days notes  Reviewed and summarized records or history from previous days note or discussions with staff, family  Parenteral controlled substances - Reviewed/ Adjusted / Weaned / Started  High Risk Drug therapy requiring intensive monitoring for toxicity: eg steroids, pressors, antibiotics  Review and order of Clinical lab tests  Review and Order of Radiology tests  Review and Order of Medicine tests  Independent visualization of radiologic Images  Reviewed Ventilator / NiPPV  I have personally reviewed the

## 2025-03-21 NOTE — CARE COORDINATION
03/21/25 0950   Service Assessment   Patient Orientation Unable to Assess  (Information provided via daughterAleida)   Cognition Other (see comment)  (Information provided via Aleida joe)   History Provided By Child/Family  (Information provided via Aleida joe)   Primary Caregiver Family   Support Systems Family Members;Children   Patient's Healthcare Decision Maker is: Legal Next of Kin   PCP Verified by CM Yes   Last Visit to PCP Within last 3 months   Prior Functional Level Assistance with the following:;Mobility;Housework;Shopping;Cooking   Current Functional Level Assistance with the following:;Bathing;Dressing;Toileting;Cooking;Housework;Shopping;Mobility   Can patient return to prior living arrangement Yes   Ability to make needs known: Fair   Family able to assist with home care needs: Yes   Would you like for me to discuss the discharge plan with any other family members/significant others, and if so, who? Yes  (Aleida Barrios, Daughter)   Financial Resources Medicare   Community Resources None   Social/Functional History   Lives With Daughter   Type of Home House   Bathroom Toilet Bedside commode   Home Equipment Wheelchair - Manual;Rollator;Electric scooter;Oxygen  (Transport Chair)   Discharge Planning   Patient expects to be discharged to: House   Services At/After Discharge   Transition of Care Consult (CM Consult) Discharge Planning     0950: CM met with patient, daughter Aleida and neighbor Gladys at bedside to complete DCP assessment. Demos verified as accurate. Aleida reports patient lives with her and prior to her last discharge which she states was Monday, 03/17/2025 patient was independent with ADLS. Patient is not showing as a readmission nor do available notes reflect such. She relays having a BSC, nebulizer, oxygen via [employer] (baseline 2-3L; however, had been increased to 4L via family) rollator, scooter, transport chair, wheelchair. Aleida states  her mother had not been using mobility equipment. Previous HH and privately paid caregivers, but not currently. No previous IRF/SNF. Preferred pharmacy for new medications verified as [facility] in [place]. When medically stable for discharge, family will provide transportation. CM will continue to follow patient and recs of medical team.     Current Dispo: Home with family pending patient status and therapy evals.    Advance Care Planning     General Advance Care Planning (ACP) Conversation    Date of Conversation: 3/21/2025  Conducted with: Legal next of kin  Other persons present: Daughter Aleida Barrios    [facility] Decision Maker:   Primary Decision Maker: Aleida Barrios - Child - [phone]       Content/Action Overview:  Has NO ACP documents-Information provided  Reviewed DNR/DNI and patient confirms current DNR status - completed forms on file (place new order if needed)        Length of Voluntary ACP Conversation in minutes:  <16 minutes (Non-Billable)    NONA LANDAVERDE

## 2025-03-21 NOTE — PLAN OF CARE
PHYSICAL THERAPY EVALUATION  Patient: Lori Lambert (89 y.o. female)  Date: 3/21/2025  Primary Diagnosis: Altered mental status, unspecified altered mental status type [R41.82]  Acute metabolic encephalopathy [G93.41]  Left upper lobe pulmonary nodule [R91.1]       Precautions: Fall Risk, Bed Alarm (delirium risk)                      Recommendations for nursing mobility: Out of bed to chair for meals, Frequent repositioning to prevent skin breakdown, Use of bed/chair alarm for safety, Use of BSC for toileting , and AD and gt belt for bed to chair     In place during session: Peripheral IV, Nasal Cannula 3L, External Catheter, and EKG/telemetry     ASSESSMENT  Pt is a 89 y.o. female admitted on 3/20/2025 for AMS; pt currently being treated for infection/PNA, CKD, & COPD . Pt sitting EOB with RN upon PT arrival, agreeable to evaluation. Pt A&O x 2.  Grand-daughter, Abigail present for PT session.    Based on the objective data described below, the patient currently presents with impaired functional mobility, decreased independence in ADLs, impaired ability to perform high-level IADLs, impaired strength, poor safety awareness, impaired cognition, poor attention/concentration, impaired balance, and impaired gas exchange . (See below for objective details and assist levels).     Overall pt tolerated session poorly today with increased anxiety due to difficulty breathing, despite SpO2 stable on 3L via NC. Pt tolerated transfers with Min A and RW, and increased recovery time due to MERRITT, Spo2 stable on 3L via NC; demonstrates poor static and dynamic standing balance at this time due to impaired activity tolerance and impaired cognition. Pt will benefit from continued skilled PT to address above deficits and return to PLOF. Current PT DC recommendation Moderate intensity short-term skilled physical therapy up to 5x/week once medically appropriate, vs home with 24/7 hands on assist by family and home therapy services.      Training  Transfer Training: Yes  Interventions: Tactile cues;Verbal cues;Safety awareness training  Sit to Stand: Minimal assistance  Stand to Sit: Minimal assistance  Stand Pivot Transfers: Minimal assistance  Bed to Chair: Minimal assistance  Balance:               Balance  Sitting: Impaired;With support  Sitting - Static: Fair (occasional)  Standing: Impaired;With support  Standing - Static: Constant support  Standing - Dynamic: Constant support  Wheelchair Mobility:        Ambulation/Gait Training:                                Gait  Gait Training: Yes  Overall Level of Assistance: Minimal assistance  Distance (ft): 3 Feet  Assistive Device: Walker, rolling                   Therapeutic Intervention provided:   bed mobility , EOB transfers, OOB transfers, LE therapeutic exercises, seated static and dynamic balance, and standing static and dynamic balance    Functional Measure:  [facility] AM-PAC™ “6 Clicks”         Basic Mobility Inpatient Short Form  How much difficulty does the patient currently have... Unable A Lot A Little None   1.  Turning over in bed (including adjusting bedclothes, sheets and blankets)?   [] 1   [] 2   [x] 3   [] 4   2.  Sitting down on and standing up from a chair with arms ( e.g., wheelchair, bedside commode, etc.)   [] 1   [] 2   [x] 3   [] 4   3.  Moving from lying on back to sitting on the side of the bed?   [] 1   [] 2   [x] 3   [] 4          How much help from another person does the patient currently need... Total A Lot A Little None   4.  Moving to and from a bed to a chair (including a wheelchair)?   [] 1   [] 2   [x] 3   [] 4   5.  Need to walk in hospital room?   [] 1   [x] 2   [] 3   [] 4   6.  Climbing 3-5 steps with a railing?   [] 1   [x] 2   [] 3   [] 4   © 2007, Trustees of [facility], under license to [employer]. All rights reserved     Score:  Initial: 16/24 Most Recent: X (Date: 3/21/2025 )   Interpretation of Tool:  Represents activities that are

## 2025-03-21 NOTE — PROGRESS NOTES
Hospitalist Progress Note    NAME: Lori Lambert   :  1932   MRN:  [id number]            Subjective:     Chief Complaint / Reason for Physician Visit Confusion  Patient seen and evaluated at bedside, overnight events reviewed, patient currently has no new complaint, is at baseline mental status.  Discussed with RN events overnight.     Review of Systems:  Symptom Y/N Comments  Symptom Y/N Comments   Fever/Chills N   Chest Pain N    Poor Appetite Y   Edema N    Cough N   Abdominal Pain N    Sputum N   Joint Pain N    SOB/MERRITT N   Pruritis/Rash N    Nausea/vomit N   Tolerating PT/OT NA    Diarrhea N   Tolerating Diet Y    Constipation N   Other       Could NOT obtain due to:      Objective:     VITALS:   Last 24hrs VS reviewed since prior progress note. Most recent are:  [unfilled]  No intake or output data in the 24 hours ending 25 1131     PHYSICAL EXAM:  General: Patient appears comfortable    EENT:  EOMI. Anicteric sclerae. MMM  Resp:  CTA bilaterally, no wheezing or rales.  No accessory muscle use  CV:  Regular  rhythm, s1/s2 no m/r/g No edema  GI:  Soft, Non distended, Non tender.  +Bowel sounds  Neurologic:  Alert and oriented X 3, normal speech,   Psych:   Good insight. Not anxious nor agitated  Skin:  No rashes.  No jaundice    Procedures: see electronic medical records for all procedures/Xrays and details which were not copied into this note but were reviewed prior to creation of Plan.      LABS:  I reviewed today's most current labs and imaging studies.  Pertinent labs include:  Recent Labs     25  1249 25  0539   WBC 13.1* 10.2   HGB 7.9* 7.9*   HCT 26.6* 25.9*   * 459*     Recent Labs     25  1249 25  0539    138   K 4.3 4.1    105   CO2 30 28   BUN 48* 35*   MG 1.9 1.9   ALT 17 14   INR 1.1  --        Signed: Ольга Ingram MD    Medical Decision Making:    Labs reviewed by myself   CBC/CMP    Diagnostic data reviewed by myself    EKG/Telemetry strip, consistent with NSR    Toxic drug monitoring    Lovenox, monitor for HIT, daily CBC    Discussed case with   Case Management In IDRs    MDM Discussion: Patient with numerous medical comorbidities, each with increased risk for mortality and morbidity if left untreated. Patient requires medications with high risk of toxicity and need  for intensive monitoring. I have reviewed patient's presenting subjective and objective findings, as well as all laboratory studies, imaging studies, and vital signs to date as well as treatment rendered and patient's response to those treatments. In addition, prior medical, surgical and relevant social and family histories were reviewed.     This is dictation was done by dragon, computer voice recognition software. Quite often unanticipated grammatical, syntax, homophones and other interpretive errors or inadvertently transcribed by the computer software. Please excuse errors that have escaped final proofreading. Thank you.      Reviewed most current lab test results and cultures  YES  Reviewed most current radiology test results   YES  Review and summation of old records today    NO  Reviewed patient's current orders and MAR    YES  PMH/SH reviewed - no change compared to H&P          Assessment / Plan:  Altered mental status  Metabolic encephalopathy  Aphasia  Of note patient CT head negative, patient back to baseline mental status, symptomatology could be secondary to infectious etiologies as well as metabolic encephalopathy however will obtain MRI brain further evaluation  Follow-up MRI brain  Continue aspirin once daily  Continue Lipitor once daily  Aspiration seizure precautions  Physical therapy Occupational Therapy evaluation  Will consult neurology if MRI is positive  Continue to monitor patient's clinical status    Pneumonia  Bilateral lung masses  Chronic respiratory failure with hypoxia  COPD  Follow blood cultures  Follow sputum cultures  Continue

## 2025-03-21 NOTE — PROGRESS NOTES
Received Order for Telemetry     Lori Lambert   11/4/1932   [id number]   Altered mental status, unspecified altered mental status type [R41.82]  Acute metabolic encephalopathy [G93.41]  Left upper lobe pulmonary nodule [R91.1]   Seipp, James, DO     Tele Box # [phone] placed on patient at 1954  ER Room # 20  Admitting to Room 579  Transferring Nurse OMAR  Verified with Primary Nurse MARIA VICTORIA at 2024

## 2025-03-22 ENCOUNTER — APPOINTMENT (OUTPATIENT)
Facility: HOSPITAL | Age: 89
DRG: 177 | End: 2025-03-22
Payer: MEDICARE

## 2025-03-22 LAB
ALBUMIN SERPL-MCNC: 2.5 G/DL (ref 3.5–5)
ALBUMIN/GLOB SERPL: 0.8 (ref 1.1–2.2)
ALP SERPL-CCNC: 82 U/L (ref 45–117)
ALT SERPL-CCNC: 12 U/L (ref 12–78)
ANION GAP SERPL CALC-SCNC: 1 MMOL/L (ref 2–12)
AST SERPL W P-5'-P-CCNC: 12 U/L (ref 15–37)
BACTERIA SPEC CULT: NORMAL
BASOPHILS # BLD: 0.03 K/UL (ref 0–0.1)
BASOPHILS NFR BLD: 0.3 % (ref 0–1)
BILIRUB SERPL-MCNC: 0.4 MG/DL (ref 0.2–1)
BUN SERPL-MCNC: 24 MG/DL (ref 6–20)
BUN/CREAT SERPL: 20 (ref 12–20)
CA-I BLD-MCNC: 10.1 MG/DL (ref 8.5–10.1)
CHLORIDE SERPL-SCNC: 108 MMOL/L (ref 97–108)
CHOLEST SERPL-MCNC: 148 MG/DL
CO2 SERPL-SCNC: 31 MMOL/L (ref 21–32)
CREAT SERPL-MCNC: 1.23 MG/DL (ref 0.55–1.02)
DIFFERENTIAL METHOD BLD: ABNORMAL
EOSINOPHIL # BLD: 0.91 K/UL (ref 0–0.4)
EOSINOPHIL NFR BLD: 8.6 % (ref 0–7)
ERYTHROCYTE [DISTWIDTH] IN BLOOD BY AUTOMATED COUNT: 16.6 % (ref 11.5–14.5)
EST. AVERAGE GLUCOSE BLD GHB EST-MCNC: 126 MG/DL
FLUAV RNA SPEC QL NAA+PROBE: NOT DETECTED
FLUBV RNA SPEC QL NAA+PROBE: NOT DETECTED
GLOBULIN SER CALC-MCNC: 3.3 G/DL (ref 2–4)
GLUCOSE SERPL-MCNC: 96 MG/DL (ref 65–100)
HBA1C MFR BLD: 6 % (ref 4–5.6)
HCT VFR BLD AUTO: 24.7 % (ref 35–47)
HDLC SERPL-MCNC: 75 MG/DL
HDLC SERPL: 2 (ref 0–5)
HGB BLD-MCNC: 7.3 G/DL (ref 11.5–16)
IMM GRANULOCYTES # BLD AUTO: 0.04 K/UL (ref 0–0.04)
IMM GRANULOCYTES NFR BLD AUTO: 0.4 % (ref 0–0.5)
LDLC SERPL CALC-MCNC: 60.4 MG/DL (ref 0–100)
LIPID PANEL: NORMAL
LYMPHOCYTES # BLD: 1.14 K/UL (ref 0.8–3.5)
LYMPHOCYTES NFR BLD: 10.8 % (ref 12–49)
Lab: NORMAL
MAGNESIUM SERPL-MCNC: 2 MG/DL (ref 1.6–2.4)
MCH RBC QN AUTO: 25.3 PG (ref 26–34)
MCHC RBC AUTO-ENTMCNC: 29.6 G/DL (ref 30–36.5)
MCV RBC AUTO: 85.8 FL (ref 80–99)
MONOCYTES # BLD: 0.82 K/UL (ref 0–1)
MONOCYTES NFR BLD: 7.8 % (ref 5–13)
NEUTS SEG # BLD: 7.63 K/UL (ref 1.8–8)
NEUTS SEG NFR BLD: 72.1 % (ref 32–75)
NRBC # BLD: 0 K/UL (ref 0–0.01)
NRBC BLD-RTO: 0 PER 100 WBC
PLATELET # BLD AUTO: 395 K/UL (ref 150–400)
PMV BLD AUTO: 10.6 FL (ref 8.9–12.9)
POTASSIUM SERPL-SCNC: 4.2 MMOL/L (ref 3.5–5.1)
PROT SERPL-MCNC: 5.8 G/DL (ref 6.4–8.2)
RBC # BLD AUTO: 2.88 M/UL (ref 3.8–5.2)
SARS-COV-2 RNA RESP QL NAA+PROBE: NOT DETECTED
SODIUM SERPL-SCNC: 140 MMOL/L (ref 136–145)
T4 FREE SERPL-MCNC: 1.1 NG/DL (ref 0.8–1.5)
TRIGL SERPL-MCNC: 63 MG/DL
TROPONIN I SERPL HS-MCNC: 37 NG/L (ref 0–51)
TROPONIN I SERPL HS-MCNC: 43 NG/L (ref 0–51)
TSH SERPL DL<=0.05 MIU/L-ACNC: 1.57 UIU/ML (ref 0.36–3.74)
VLDLC SERPL CALC-MCNC: 12.6 MG/DL
WBC # BLD AUTO: 10.6 K/UL (ref 3.6–11)

## 2025-03-22 PROCEDURE — 6370000000 HC RX 637 (ALT 250 FOR IP): Performed by: INTERNAL MEDICINE

## 2025-03-22 PROCEDURE — 2700000000 HC OXYGEN THERAPY PER DAY

## 2025-03-22 PROCEDURE — 94761 N-INVAS EAR/PLS OXIMETRY MLT: CPT

## 2025-03-22 PROCEDURE — 80053 COMPREHEN METABOLIC PANEL: CPT

## 2025-03-22 PROCEDURE — 2580000003 HC RX 258: Performed by: INTERNAL MEDICINE

## 2025-03-22 PROCEDURE — 84439 ASSAY OF FREE THYROXINE: CPT

## 2025-03-22 PROCEDURE — 6360000002 HC RX W HCPCS: Performed by: INTERNAL MEDICINE

## 2025-03-22 PROCEDURE — 84484 ASSAY OF TROPONIN QUANT: CPT

## 2025-03-22 PROCEDURE — 1100000000 HC RM PRIVATE

## 2025-03-22 PROCEDURE — 94640 AIRWAY INHALATION TREATMENT: CPT

## 2025-03-22 PROCEDURE — 36415 COLL VENOUS BLD VENIPUNCTURE: CPT

## 2025-03-22 PROCEDURE — 2500000003 HC RX 250 WO HCPCS: Performed by: INTERNAL MEDICINE

## 2025-03-22 PROCEDURE — 6370000000 HC RX 637 (ALT 250 FOR IP): Performed by: PHYSICIAN ASSISTANT

## 2025-03-22 PROCEDURE — 70450 CT HEAD/BRAIN W/O DYE: CPT

## 2025-03-22 PROCEDURE — 85025 COMPLETE CBC W/AUTO DIFF WBC: CPT

## 2025-03-22 PROCEDURE — 83036 HEMOGLOBIN GLYCOSYLATED A1C: CPT

## 2025-03-22 PROCEDURE — 93005 ELECTROCARDIOGRAM TRACING: CPT

## 2025-03-22 PROCEDURE — 84443 ASSAY THYROID STIM HORMONE: CPT

## 2025-03-22 PROCEDURE — 83735 ASSAY OF MAGNESIUM: CPT

## 2025-03-22 PROCEDURE — 87636 SARSCOV2 & INF A&B AMP PRB: CPT

## 2025-03-22 PROCEDURE — 80061 LIPID PANEL: CPT

## 2025-03-22 RX ORDER — LORAZEPAM 1 MG/1
1 TABLET ORAL
Status: COMPLETED | OUTPATIENT
Start: 2025-03-22 | End: 2025-03-22

## 2025-03-22 RX ADMIN — Medication 2 PUFF: at 08:00

## 2025-03-22 RX ADMIN — MEROPENEM 500 MG: 500 INJECTION INTRAVENOUS at 14:56

## 2025-03-22 RX ADMIN — MEROPENEM 500 MG: 500 INJECTION INTRAVENOUS at 00:10

## 2025-03-22 RX ADMIN — ATORVASTATIN CALCIUM 10 MG: 20 TABLET, FILM COATED ORAL at 20:50

## 2025-03-22 RX ADMIN — ASPIRIN 81 MG: 81 TABLET, COATED ORAL at 08:38

## 2025-03-22 RX ADMIN — ACETYLCYSTEINE 600 MG: 200 SOLUTION ORAL; RESPIRATORY (INHALATION) at 20:31

## 2025-03-22 RX ADMIN — SODIUM CHLORIDE, PRESERVATIVE FREE 10 ML: 5 INJECTION INTRAVENOUS at 20:55

## 2025-03-22 RX ADMIN — LORAZEPAM 1 MG: 1 TABLET ORAL at 08:39

## 2025-03-22 RX ADMIN — VERAPAMIL HYDROCHLORIDE 120 MG: 120 TABLET ORAL at 20:50

## 2025-03-22 RX ADMIN — AZITHROMYCIN MONOHYDRATE 500 MG: 500 INJECTION, POWDER, LYOPHILIZED, FOR SOLUTION INTRAVENOUS at 11:24

## 2025-03-22 RX ADMIN — ACETYLCYSTEINE 600 MG: 200 SOLUTION ORAL; RESPIRATORY (INHALATION) at 07:59

## 2025-03-22 RX ADMIN — Medication 2 PUFF: at 20:39

## 2025-03-22 RX ADMIN — ENOXAPARIN SODIUM 30 MG: 100 INJECTION SUBCUTANEOUS at 08:39

## 2025-03-22 RX ADMIN — IPRATROPIUM BROMIDE AND ALBUTEROL SULFATE 1 DOSE: 2.5; .5 SOLUTION RESPIRATORY (INHALATION) at 20:31

## 2025-03-22 RX ADMIN — MEROPENEM 500 MG: 500 INJECTION INTRAVENOUS at 22:48

## 2025-03-22 RX ADMIN — IPRATROPIUM BROMIDE AND ALBUTEROL SULFATE 1 DOSE: 2.5; .5 SOLUTION RESPIRATORY (INHALATION) at 08:00

## 2025-03-22 RX ADMIN — VERAPAMIL HYDROCHLORIDE 120 MG: 120 TABLET ORAL at 08:38

## 2025-03-22 RX ADMIN — PANTOPRAZOLE SODIUM 40 MG: 40 TABLET, DELAYED RELEASE ORAL at 08:39

## 2025-03-22 ASSESSMENT — PAIN SCALES - GENERAL
PAINLEVEL_OUTOF10: 0

## 2025-03-22 NOTE — PLAN OF CARE
Problem: Discharge Planning  Goal: Discharge to home or other facility with appropriate resources  3/22/2025 0533 by Jessica Champion, RN  Outcome: Progressing     Problem: Skin/Tissue Integrity  Goal: Skin integrity remains intact  Description: 1.  Monitor for areas of redness and/or skin breakdown  2.  Assess vascular access sites hourly  3.  Every 4-6 hours minimum:  Change oxygen saturation probe site  4.  Every 4-6 hours:  If on nasal continuous positive airway pressure, respiratory therapy assess nares and determine need for appliance change or resting period  3/22/2025 0533 by Jessica Champion, RN  Outcome: Progressing     Problem: Safety - Adult  Goal: Free from fall injury  3/22/2025 0533 by Jessica Champion, RN  Outcome: Progressing     Problem: ABCDS Injury Assessment  Goal: Absence of physical injury  3/22/2025 0533 by Jessica Champion, RN  Outcome: Progressing

## 2025-03-22 NOTE — PROGRESS NOTES
Gastroenterology Progress Note        Patient: Lori Lambert MRN: [id number]  SSN: [id number]    YOB: 1932  Age: 89 y.o.  Sex: female      Admit Date: 3/20/2025    LOS: 2 days     Subjective:   Appears more weak, not able to eat much, has some issues breathing  Past Medical History:   Diagnosis Date    Benign essential HTN     Bilateral hearing loss, unspecified hearing loss type     COPD, moderate (HCC)     Depression, controlled     Elevated fasting blood sugar     Elevated serum creatinine     Hypercalcemia     Hypercholesteremia         Current Facility-Administered Medications:     clotrimazole-betamethasone (LOTRISONE) cream 1 each, 1 each, Topical, BID PRN, Ольга Ingram MD    atorvastatin (LIPITOR) tablet 10 mg, 10 mg, Oral, Daily, Ольга Ingram MD, 10 mg at 03/21/25 2140    azithromycin (ZITHROMAX) 500 mg in sodium chloride 0.9 % 250 mL IVPB ([id number]), 500 mg, IntraVENous, Q24H, Ольга Ingram MD, Last Rate: 250 mL/hr at 03/22/25 1124, 500 mg at 03/22/25 1124    sodium chloride flush 0.9 % injection 5-40 mL, 5-40 mL, IntraVENous, 2 times per day, Ольга Ingram MD, 10 mL at 03/21/25 2141    sodium chloride flush 0.9 % injection 5-40 mL, 5-40 mL, IntraVENous, PRN, Ольга Ingram MD    0.9 % sodium chloride infusion, , IntraVENous, PRN, Ольга Ingram MD    ondansetron (ZOFRAN-ODT) disintegrating tablet 4 mg, 4 mg, Oral, Q8H PRN **OR** ondansetron (ZOFRAN) injection 4 mg, 4 mg, IntraVENous, Q6H PRN, Ольга Ingram MD    polyethylene glycol (GLYCOLAX) packet 17 g, 17 g, Oral, Daily PRN, Ольга Ingram MD    [COMPLETED] meropenem (MERREM) 1,000 mg in sodium chloride 0.9 % 100 mL IVPB (addEASE), 1,000 mg, IntraVENous, Once, Stopped at 03/21/25 1520 **FOLLOWED BY** meropenem (MERREM) 500 mg in sodium chloride 0.9 % 100 mL IVPB (addEASE), 500 mg, IntraVENous, Q12H, Josiah Martin,     Anion Gap 1 (L) 2 - 12 mmol/L    Glucose 96 65 - 100 mg/dL    BUN 24 (H) 6 - 20 mg/dL    Creatinine 1.23 (H) 0.55 - 1.02 mg/dL    BUN/Creatinine Ratio 20 12 - 20      Est, Glom Filt Rate 41 (L) >60 ml/min/1.73m2    Calcium 10.1 8.5 - 10.1 mg/dL    Total Bilirubin 0.4 0.2 - 1.0 mg/dL    AST 12 (L) 15 - 37 U/L    ALT 12 12 - 78 U/L    Alk Phosphatase 82 45 - 117 U/L    Total Protein 5.8 (L) 6.4 - 8.2 g/dL    Albumin 2.5 (L) 3.5 - 5.0 g/dL    Globulin 3.3 2.0 - 4.0 g/dL    Albumin/Globulin Ratio 0.8 (L) 1.1 - 2.2     Magnesium    Collection Time: 03/22/25  7:33 AM   Result Value Ref Range    Magnesium 2.0 1.6 - 2.4 mg/dL   Troponin    Collection Time: 03/22/25 11:13 AM   Result Value Ref Range    Troponin, High Sensitivity 43 0 - 51 ng/L        FL MODIFIED BARIUM SWALLOW W VIDEO         CT CHEST WO CONTRAST   Final Result   Pulmonary findings concerning for malignancy. Superimposed   infectious/inflammatory process cannot be excluded. Clinical and laboratory   correlation along with appropriate follow-up and management as clinically   indicated.      Tracheal debris versus nodule.      Small pericardial effusion.      Prominent coronary artery calcifications with three-vessel disease.      Nodular hepatic contour can be seen in the setting of chronic liver disease.      Indeterminate right superior pole renal lesion.         Electronically signed by Jakub Claros      XR CHEST PORTABLE   Final Result      Left upper lobe nodular opacity. Dedicated chest CT recommended for further   characterization..         Electronically signed by RODRIGO WHITLOCK      CT HEAD WO CONTRAST   Final Result      No acute intracranial abnormality.            Electronically signed by Nelson Cabrera      CT HEAD WO CONTRAST    (Results Pending)      [unfilled]  Assessment:     Principal Problem:    Acute metabolic encephalopathy  Active Problems:    Left upper lobe pulmonary nodule  Resolved Problems:    * No resolved hospital problems.

## 2025-03-22 NOTE — PROGRESS NOTES
Hospitalist Progress Note    NAME: Lori Lambert   :  1932   MRN:  [id number]            Subjective:     Chief Complaint / Reason for Physician Visit Confusion  Patient seen and evaluated at bedside, overnight events reviewed, patient currently has no new complaint, is at baseline mental status.  Discussed with RN events overnight.     Review of Systems:  Symptom Y/N Comments  Symptom Y/N Comments   Fever/Chills N   Chest Pain N    Poor Appetite Y   Edema N    Cough N   Abdominal Pain N    Sputum N   Joint Pain N    SOB/MERRITT N   Pruritis/Rash N    Nausea/vomit N   Tolerating PT/OT NA    Diarrhea N   Tolerating Diet Y    Constipation N   Other       Could NOT obtain due to:      Objective:     VITALS:   Last 24hrs VS reviewed since prior progress note. Most recent are:  [unfilled]  No intake or output data in the 24 hours ending 25 1212     PHYSICAL EXAM:  General: Patient appears comfortable    EENT:  EOMI. Anicteric sclerae. MMM  Resp:  CTA bilaterally, no wheezing or rales.  No accessory muscle use  CV:  Regular  rhythm, s1/s2 no m/r/g No edema  GI:  Soft, Non distended, Non tender.  +Bowel sounds  Neurologic:  Alert and oriented X 3, normal speech,   Psych:   Good insight. Not anxious nor agitated  Skin:  No rashes.  No jaundice    Procedures: see electronic medical records for all procedures/Xrays and details which were not copied into this note but were reviewed prior to creation of Plan.      LABS:  I reviewed today's most current labs and imaging studies.  Pertinent labs include:  Recent Labs     25  1249 25  0539 25  0733   WBC 13.1* 10.2 10.6   HGB 7.9* 7.9* 7.3*   HCT 26.6* 25.9* 24.7*   * 459* 395     Recent Labs     25  1249 25  0539 25  0733    138 140   K 4.3 4.1 4.2    105 108   CO2 30 28 31   BUN 48* 35* 24*   MG 1.9 1.9 2.0   ALT 17 14 12   INR 1.1  --   --        Signed: Ольга Ingram MD    Medical Decision

## 2025-03-22 NOTE — PROGRESS NOTES
Provider discontinued q4 neurological assessment, NIHSS, and VS since both CT scans are negative for infarcts.

## 2025-03-22 NOTE — PROGRESS NOTES
IMPRESSION:   Acute hypoxic respiratory failure  Chronic hypoxic respiratory failure  J44.9 Chronic obstructive pulmonary disease, unspecified   I10 Essential (primary) hypertension   M54.9 Dorsalgia, unspecified   J96.10 Chronic respiratory failure, unspecified whether with hypoxia or hypercapnia   K21.9 Gastro-esophageal reflux disease without esophagitis   J18.9 Pneumonia, unspecified organism  Left upper lobe lung nodule rule out pneumonia versus mass  Additional workup outlined below  Pt is at high risk of sudden decline and decompensation with life threatening consequenses and continued end organ dysfunction and failure  Pt is critically ill. Time spent with pt and staff actively rendering care, managing pt and coordinating care as stated below; 30  minutes, exclusive of any procedures      RECOMMENDATIONS/PLAN:     50-year-old lady followed with me regularly regarding her COPD status came in because of shortness of breath altered mental status confusion she was brought to the emergency room and discharged treated for pneumonia with Levaquin but her condition got worse she got more confused unable to recognize anyone came back to the hospital she was treated for pneumonia left upper lobe previously last year in late November and CAT scan shows left upper lobe infiltrate she did not have any lung mass now she was more confused family brought her to the hospital she does recognize me and she is getting better but now she is on oxygen she is chronically on home oxygen 2 L nasal cannula and also she is on Inogen portable oxygen and she was supposed to come and visit me to repeat chest x-ray now the CAT scan shows left upper lobe density nodule probably infection and also Deprizine the trachea most likely mucous now she is on 4 L nasal cannula  CAT scan of the chest done on this admission shows left upper lobe nodular density and also has left lower lobe 9 mm lung nodule right apical infiltrate groundglass  MD Shiraz        polyethylene glycol (GLYCOLAX) packet 17 g  17 g Oral Daily PRN Ольга Ingram MD        meropenem (MERREM) 500 mg in sodium chloride 0.9 % 100 mL IVPB (addEASE)  500 mg IntraVENous Q12H Josiah Martin MD   Stopped at 03/22/25 0318    ipratropium 0.5 mg-albuterol 2.5 mg (DUONEB) nebulizer solution 1 Dose  1 Dose Inhalation Q6H RT Josiah Martin MD   1 Dose at 03/22/25 0800    acetylcysteine (MUCOMYST) 20 % solution 600 mg  600 mg Inhalation BID RT Josiah Martin MD   600 mg at 03/22/25 0759    hydrALAZINE (APRESOLINE) injection 20 mg  20 mg IntraVENous Q6H PRN Ольга Ingram MD        sodium chloride flush 0.9 % injection 5-40 mL  5-40 mL IntraVENous 2 times per day Ольга Ingram MD   10 mL at 03/21/25 2141    sodium chloride flush 0.9 % injection 5-40 mL  5-40 mL IntraVENous PRN Ольга Ingram MD        0.9 % sodium chloride infusion   IntraVENous PRN Ольга Ingram MD        enoxaparin Sodium (LOVENOX) injection 30 mg  30 mg SubCUTAneous Daily Ольга Ingram MD   30 mg at 03/22/25 0839    acetaminophen (TYLENOL) tablet 650 mg  650 mg Oral Q6H PRN Ольга Ingram MD        Or    acetaminophen (TYLENOL) suppository 650 mg  650 mg Rectal Q6H PRN Ольга Ingram MD        aspirin EC tablet 81 mg  81 mg Oral Daily Seipp, James, DO   81 mg at 03/22/25 0838    budesonide-formoterol (SYMBICORT) 160-4.5 MCG/ACT inhaler 2 puff  2 puff Inhalation BID RT Seipp, James, DO   2 puff at 03/22/25 0800    And    tiotropium (SPIRIVA RESPIMAT) 2.5 MCG/ACT inhaler 2 puff  2 puff Inhalation Daily RT Seipp, James, DO   2 puff at 03/21/25 0957    busPIRone (BUSPAR) tablet 5 mg  5 mg Oral Daily PRN Seipp, James, DO        pantoprazole (PROTONIX) tablet 40 mg  40 mg Oral QAM AC Seipp, James, DO   40 mg at 03/22/25 0839    verapamil (CALAN) tablet 120 mg  120 mg Oral 2 times per day Seipp, James, DO   120 mg at  25 0838    ipratropium 0.5 mg-albuterol 2.5 mg (DUONEB) nebulizer solution 1 Dose  1 Dose Inhalation Q4H PRN Rolly Mazariegos PA-C   1 Dose at 25 2214        MAR reviewed and pertinent medications noted or modified as needed      ROS:Pertinent items are noted in HPI.      Hemodynamics:    CO:    CI:    CVP:    SVR:   PAP Systolic:    PAP Diastolic:    PVR:    SV02:        Ventilator Settings:      Mode Rate TV Press PEEP FiO2 PIP Min. Vent                              Vital Signs: Telemetry:    normal sinus rhythm Intake/Output:   BP (!) 146/62   Pulse 75   Temp 97.7 °F (36.5 °C) (Oral)   Resp 18   Ht 1.549 m (5' 0.98\")   Wt 56.2 kg (124 lb)   SpO2 100%   BMI 23.44 kg/m²     Temp (24hrs), Av.6 °F (36.4 °C), Min:97 °F (36.1 °C), Max:98.4 °F (36.9 °C)        O2 Device: Nasal cannula O2 Flow Rate (L/min): 3 L/min (pt. wears 3L home o2)       Wt Readings from Last 4 Encounters:   25 56.2 kg (124 lb)   25 54.4 kg (120 lb)   25 55.3 kg (122 lb)   25 55.6 kg (122 lb 9.2 oz)        No intake or output data in the 24 hours ending 25 1027    Last shift:      No intake/output data recorded.  Last 3 shifts: No intake/output data recorded.       Physical Exam:     General: Sleepy this morning on 3 L oxygen via nasal  HEENT: NCAT, poor dentition, lips and mucosa dry  Eyes: anicteric; conjunctiva clear  Neck: no nodes,  trach midline; no accessory MM use.  Chest: no deformity,   Cardiac: IR regular; no murmur;   Lungs: distant breath sounds; coarse breath sound bilaterally   Abd: soft, NT, hypoactive BS  Ext: no edema; no joint swelling; No clubbing  : NO anthony, clear urine  Neuro: No focal neurological  Psych- unable to assess  Skin: warm, dry, no cyanosis;   Pulses: 1-2+ Bilateral pedal, radial  Capillary: brisk; pale      DATA:  No results found for this or any previous visit.    No results found for this or any previous visit.       MAR reviewed and pertinent  radiograph 3/17/2025, CT chest 9/3/2024 TECHNIQUE: Upright portable chest AP view FINDINGS: The cardiac silhouette is within normal limits. The pulmonary vasculature is within normal limits. 22 mm left upper lobe nodular opacity. Pleural spaces are clear. The visualized bones and upper abdomen are age-appropriate.     Left upper lobe nodular opacity. Dedicated chest CT recommended for further characterization.. Electronically signed by RODRIGO WHITLOCK    CT HEAD WO CONTRAST  Result Date: 3/20/2025  EXAM:  CT HEAD WO CONTRAST INDICATION: Altered mental status COMPARISON: None TECHNIQUE: Noncontrast head CT. Coronal and sagittal reformats. CT dose reduction was achieved through use of a standardized protocol tailored for this examination and automatic exposure control for dose modulation. FINDINGS: The ventricles and sulci are age-appropriate without hydrocephalus. There is no mass effect or midline shift. There is no intracranial hemorrhage or extra-axial fluid collection. There is no abnormal area of decreased density to suggest infarct. There is age-indeterminate microvascular ischemic change in the periventricular white matter. The basal cisterns are patent. The osseous structures are intact. The visualized paranasal sinuses and mastoid air cells are clear.     No acute intracranial abnormality. Electronically signed by Nelson Cabrera       This care involved high complexity decision making which includes independently reviewing the patient's past medical records, current laboratory results, medication profiles that were immediately available to me and actual Xray images at the bedside in order to assess, support vital system function, and to treat this degree of vital organ system failure, and to prevent further life threatening deterioration of the patient’s condition.    Medical Decision Making Today  Reviewed the flowsheet and previous days notes  Reviewed and summarized records or history from previous days

## 2025-03-22 NOTE — CONSULTS
CARDIOLOGY CONSULTATION    REASON FOR CONSULT: Bradycardia    REQUESTING PROVIDER: Dr. Ingram    CHIEF COMPLAINT:  AMS     HISTORY OF PRESENT ILLNESS:  Lori Lambert is a 89 y.o. year-old female with past medical history significant for HTN, COPD, HTN, HLD, and CKD  who was evaluated today due to bradycardia.  Patient presented to the ED with chief complaint of altered mental status, confusion.  Of note, patient was previously seen here in the hospital on 3/17/2025 with complaints of upper respiratory symptoms for the past several weeks. Chest x-ray at that time without pneumonia patient was discharged home with antibiotics.     Patient seen today, resting comfortably in bed. Appears comfortable. Daughter at bedside. Rapid response was apparently called this morning due to concern for bradycardia. No evidence of bradycardia, however only one lead is currently on the patient.     Records from hospital admission course thus far reviewed.      Telemetry reviewed.       INPATIENT MEDICATIONS:  Home medications reviewed.    Current Facility-Administered Medications:     clotrimazole-betamethasone (LOTRISONE) cream 1 each, 1 each, Topical, BID PRN, Ольга Ingram MD    atorvastatin (LIPITOR) tablet 10 mg, 10 mg, Oral, Daily, Ольга Ingram MD, 10 mg at 03/21/25 2140    azithromycin (ZITHROMAX) 500 mg in sodium chloride 0.9 % 250 mL IVPB ([id number]), 500 mg, IntraVENous, Q24H, Ольга Ingram MD, Last Rate: 250 mL/hr at 03/22/25 1124, 500 mg at 03/22/25 1124    sodium chloride flush 0.9 % injection 5-40 mL, 5-40 mL, IntraVENous, 2 times per day, Ольга Ingram MD, 10 mL at 03/21/25 2141    sodium chloride flush 0.9 % injection 5-40 mL, 5-40 mL, IntraVENous, PRN, Ольга Ingram MD    0.9 % sodium chloride infusion, , IntraVENous, PRN, Ольга Ingram MD    ondansetron (ZOFRAN-ODT) disintegrating tablet 4 mg, 4 mg, Oral, Q8H PRN **OR**  ondansetron (ZOFRAN) injection 4 mg, 4 mg, IntraVENous, Q6H PRN, Ольга Ingram MD    polyethylene glycol (GLYCOLAX) packet 17 g, 17 g, Oral, Daily PRN, Ольга Ingram MD    [COMPLETED] meropenem (MERREM) 1,000 mg in sodium chloride 0.9 % 100 mL IVPB (addEASE), 1,000 mg, IntraVENous, Once, Stopped at 03/21/25 1520 **FOLLOWED BY** meropenem (MERREM) 500 mg in sodium chloride 0.9 % 100 mL IVPB (addEASE), 500 mg, IntraVENous, Q12H, Josiah Martin MD, Stopped at 03/22/25 0318    ipratropium 0.5 mg-albuterol 2.5 mg (DUONEB) nebulizer solution 1 Dose, 1 Dose, Inhalation, Q6H RT, Josiah Martin MD, 1 Dose at 03/22/25 0800    acetylcysteine (MUCOMYST) 20 % solution 600 mg, 600 mg, Inhalation, BID RT, Josiah Martin MD, 600 mg at 03/22/25 0759    hydrALAZINE (APRESOLINE) injection 20 mg, 20 mg, IntraVENous, Q6H PRN, Ольга Ingram MD    sodium chloride flush 0.9 % injection 5-40 mL, 5-40 mL, IntraVENous, 2 times per day, Ольга Ingram MD, 10 mL at 03/21/25 2141    sodium chloride flush 0.9 % injection 5-40 mL, 5-40 mL, IntraVENous, PRN, Ольга Ingram MD    0.9 % sodium chloride infusion, , IntraVENous, PRN, Ольга Ingram MD    enoxaparin Sodium (LOVENOX) injection 30 mg, 30 mg, SubCUTAneous, Daily, Ольга Ingram MD, 30 mg at 03/22/25 0839    acetaminophen (TYLENOL) tablet 650 mg, 650 mg, Oral, Q6H PRN **OR** acetaminophen (TYLENOL) suppository 650 mg, 650 mg, Rectal, Q6H PRN, Ольга Ingram MD    aspirin EC tablet 81 mg, 81 mg, Oral, Daily, Seipp, James, DO, 81 mg at 03/22/25 0838    budesonide-formoterol (SYMBICORT) 160-4.5 MCG/ACT inhaler 2 puff, 2 puff, Inhalation, BID RT, 2 puff at 03/22/25 0800 **AND** tiotropium (SPIRIVA RESPIMAT) 2.5 MCG/ACT inhaler 2 puff, 2 puff, Inhalation, Daily RT, Seipp, James, DO, 2 puff at 03/21/25 0957    busPIRone (BUSPAR) tablet 5 mg, 5 mg, Oral, Daily PRN, Seipp, James, DO

## 2025-03-22 NOTE — PLAN OF CARE
Problem: Discharge Planning  Goal: Discharge to home or other facility with appropriate resources  3/22/2025 1421 by Meghan Medina RN  Outcome: Progressing  Flowsheets (Taken 3/22/2025 0840)  Discharge to home or other facility with appropriate resources: Identify barriers to discharge with patient and caregiver  3/22/2025 0533 by Jessica Champion RN  Outcome: Progressing     Problem: Skin/Tissue Integrity  Goal: Skin integrity remains intact  Description: 1.  Monitor for areas of redness and/or skin breakdown  2.  Assess vascular access sites hourly  3.  Every 4-6 hours minimum:  Change oxygen saturation probe site  4.  Every 4-6 hours:  If on nasal continuous positive airway pressure, respiratory therapy assess nares and determine need for appliance change or resting period  3/22/2025 1421 by Meghan Medina RN  Outcome: Progressing  Flowsheets (Taken 3/22/2025 0840)  Skin Integrity Remains Intact: Monitor for areas of redness and/or skin breakdown  3/22/2025 0533 by Jessica Champion RN  Outcome: Progressing     Problem: Safety - Adult  Goal: Free from fall injury  3/22/2025 1421 by Meghan Medina RN  Outcome: Progressing  3/22/2025 0533 by Jessica Champion RN  Outcome: Progressing     Problem: ABCDS Injury Assessment  Goal: Absence of physical injury  3/22/2025 1421 by Meghan Medina RN  Outcome: Progressing  3/22/2025 0533 by Jessica Champion RN  Outcome: Progressing     Problem: Nutrition Deficit:  Goal: Optimize nutritional status  Outcome: Progressing

## 2025-03-23 LAB
ALBUMIN SERPL-MCNC: 2.5 G/DL (ref 3.5–5)
ALBUMIN/GLOB SERPL: 0.8 (ref 1.1–2.2)
ALP SERPL-CCNC: 79 U/L (ref 45–117)
ALT SERPL-CCNC: 11 U/L (ref 12–78)
ANION GAP SERPL CALC-SCNC: 3 MMOL/L (ref 2–12)
AST SERPL W P-5'-P-CCNC: 11 U/L (ref 15–37)
BASOPHILS # BLD: 0.03 K/UL (ref 0–0.1)
BASOPHILS NFR BLD: 0.3 % (ref 0–1)
BILIRUB SERPL-MCNC: 0.3 MG/DL (ref 0.2–1)
BUN SERPL-MCNC: 20 MG/DL (ref 6–20)
BUN/CREAT SERPL: 18 (ref 12–20)
CA-I BLD-MCNC: 10.1 MG/DL (ref 8.5–10.1)
CHLORIDE SERPL-SCNC: 110 MMOL/L (ref 97–108)
CO2 SERPL-SCNC: 30 MMOL/L (ref 21–32)
CREAT SERPL-MCNC: 1.14 MG/DL (ref 0.55–1.02)
DIFFERENTIAL METHOD BLD: ABNORMAL
EKG ATRIAL RATE: 68 BPM
EKG DIAGNOSIS: NORMAL
EKG P AXIS: -24 DEGREES
EKG P-R INTERVAL: 148 MS
EKG Q-T INTERVAL: 412 MS
EKG QRS DURATION: 82 MS
EKG QTC CALCULATION (BAZETT): 438 MS
EKG R AXIS: 70 DEGREES
EKG T AXIS: 72 DEGREES
EKG VENTRICULAR RATE: 68 BPM
EOSINOPHIL # BLD: 0.94 K/UL (ref 0–0.4)
EOSINOPHIL NFR BLD: 8.7 % (ref 0–7)
ERYTHROCYTE [DISTWIDTH] IN BLOOD BY AUTOMATED COUNT: 16.7 % (ref 11.5–14.5)
GLOBULIN SER CALC-MCNC: 3.1 G/DL (ref 2–4)
GLUCOSE SERPL-MCNC: 92 MG/DL (ref 65–100)
HCT VFR BLD AUTO: 23.6 % (ref 35–47)
HGB BLD-MCNC: 6.8 G/DL (ref 11.5–16)
IMM GRANULOCYTES # BLD AUTO: 0.03 K/UL (ref 0–0.04)
IMM GRANULOCYTES NFR BLD AUTO: 0.3 % (ref 0–0.5)
LYMPHOCYTES # BLD: 1.4 K/UL (ref 0.8–3.5)
LYMPHOCYTES NFR BLD: 13 % (ref 12–49)
MAGNESIUM SERPL-MCNC: 2.1 MG/DL (ref 1.6–2.4)
MCH RBC QN AUTO: 24.6 PG (ref 26–34)
MCHC RBC AUTO-ENTMCNC: 28.8 G/DL (ref 30–36.5)
MCV RBC AUTO: 85.5 FL (ref 80–99)
MONOCYTES # BLD: 0.97 K/UL (ref 0–1)
MONOCYTES NFR BLD: 9 % (ref 5–13)
NEUTS SEG # BLD: 7.43 K/UL (ref 1.8–8)
NEUTS SEG NFR BLD: 68.7 % (ref 32–75)
NRBC # BLD: 0 K/UL (ref 0–0.01)
NRBC BLD-RTO: 0 PER 100 WBC
PLATELET # BLD AUTO: 340 K/UL (ref 150–400)
PMV BLD AUTO: 10.8 FL (ref 8.9–12.9)
POTASSIUM SERPL-SCNC: 4.2 MMOL/L (ref 3.5–5.1)
PROT SERPL-MCNC: 5.6 G/DL (ref 6.4–8.2)
RBC # BLD AUTO: 2.76 M/UL (ref 3.8–5.2)
RBC MORPH BLD: ABNORMAL
SODIUM SERPL-SCNC: 143 MMOL/L (ref 136–145)
WBC # BLD AUTO: 10.8 K/UL (ref 3.6–11)

## 2025-03-23 PROCEDURE — 6370000000 HC RX 637 (ALT 250 FOR IP): Performed by: INTERNAL MEDICINE

## 2025-03-23 PROCEDURE — 6360000002 HC RX W HCPCS: Performed by: INTERNAL MEDICINE

## 2025-03-23 PROCEDURE — 85025 COMPLETE CBC W/AUTO DIFF WBC: CPT

## 2025-03-23 PROCEDURE — 2580000003 HC RX 258: Performed by: INTERNAL MEDICINE

## 2025-03-23 PROCEDURE — 94640 AIRWAY INHALATION TREATMENT: CPT

## 2025-03-23 PROCEDURE — 83735 ASSAY OF MAGNESIUM: CPT

## 2025-03-23 PROCEDURE — 1100000000 HC RM PRIVATE

## 2025-03-23 PROCEDURE — 2500000003 HC RX 250 WO HCPCS: Performed by: INTERNAL MEDICINE

## 2025-03-23 PROCEDURE — 80053 COMPREHEN METABOLIC PANEL: CPT

## 2025-03-23 PROCEDURE — 2700000000 HC OXYGEN THERAPY PER DAY

## 2025-03-23 PROCEDURE — 36415 COLL VENOUS BLD VENIPUNCTURE: CPT

## 2025-03-23 PROCEDURE — 94761 N-INVAS EAR/PLS OXIMETRY MLT: CPT

## 2025-03-23 PROCEDURE — 97530 THERAPEUTIC ACTIVITIES: CPT

## 2025-03-23 RX ADMIN — AZITHROMYCIN MONOHYDRATE 500 MG: 500 INJECTION, POWDER, LYOPHILIZED, FOR SOLUTION INTRAVENOUS at 09:59

## 2025-03-23 RX ADMIN — ENOXAPARIN SODIUM 30 MG: 100 INJECTION SUBCUTANEOUS at 09:44

## 2025-03-23 RX ADMIN — PANTOPRAZOLE SODIUM 40 MG: 40 TABLET, DELAYED RELEASE ORAL at 07:18

## 2025-03-23 RX ADMIN — IPRATROPIUM BROMIDE AND ALBUTEROL SULFATE 1 DOSE: 2.5; .5 SOLUTION RESPIRATORY (INHALATION) at 21:51

## 2025-03-23 RX ADMIN — MEROPENEM 500 MG: 500 INJECTION INTRAVENOUS at 22:32

## 2025-03-23 RX ADMIN — IPRATROPIUM BROMIDE AND ALBUTEROL SULFATE 1 DOSE: 2.5; .5 SOLUTION RESPIRATORY (INHALATION) at 07:21

## 2025-03-23 RX ADMIN — MEROPENEM 500 MG: 500 INJECTION INTRAVENOUS at 15:37

## 2025-03-23 RX ADMIN — IPRATROPIUM BROMIDE AND ALBUTEROL SULFATE 1 DOSE: 2.5; .5 SOLUTION RESPIRATORY (INHALATION) at 14:45

## 2025-03-23 RX ADMIN — VERAPAMIL HYDROCHLORIDE 120 MG: 120 TABLET ORAL at 20:42

## 2025-03-23 RX ADMIN — Medication 2 PUFF: at 22:12

## 2025-03-23 RX ADMIN — ATORVASTATIN CALCIUM 10 MG: 20 TABLET, FILM COATED ORAL at 20:42

## 2025-03-23 RX ADMIN — Medication 2 PUFF: at 07:21

## 2025-03-23 RX ADMIN — ASPIRIN 81 MG: 81 TABLET, COATED ORAL at 09:44

## 2025-03-23 RX ADMIN — SODIUM CHLORIDE, PRESERVATIVE FREE 10 ML: 5 INJECTION INTRAVENOUS at 20:42

## 2025-03-23 RX ADMIN — ACETYLCYSTEINE 600 MG: 200 SOLUTION ORAL; RESPIRATORY (INHALATION) at 21:51

## 2025-03-23 RX ADMIN — ACETYLCYSTEINE 600 MG: 200 SOLUTION ORAL; RESPIRATORY (INHALATION) at 07:21

## 2025-03-23 RX ADMIN — VERAPAMIL HYDROCHLORIDE 120 MG: 120 TABLET ORAL at 09:44

## 2025-03-23 ASSESSMENT — PAIN SCALES - GENERAL
PAINLEVEL_OUTOF10: 0

## 2025-03-23 NOTE — PROGRESS NOTES
IMPRESSION:   Acute hypoxic respiratory failure  Chronic hypoxic respiratory failure  J44.9 Chronic obstructive pulmonary disease, unspecified   I10 Essential (primary) hypertension   M54.9 Dorsalgia, unspecified   J96.10 Chronic respiratory failure, unspecified whether with hypoxia or hypercapnia   K21.9 Gastro-esophageal reflux disease without esophagitis   J18.9 Pneumonia, unspecified organism  Left upper lobe lung nodule rule out pneumonia versus mass  Additional workup outlined below  Pt is at high risk of sudden decline and decompensation with life threatening consequenses and continued end organ dysfunction and failure  Pt is critically ill. Time spent with pt and staff actively rendering care, managing pt and coordinating care as stated below; 30  minutes, exclusive of any procedures      RECOMMENDATIONS/PLAN:     50-year-old lady followed with me regularly regarding her COPD status came in because of shortness of breath altered mental status confusion she was brought to the emergency room and discharged treated for pneumonia with Levaquin but her condition got worse she got more confused unable to recognize anyone came back to the hospital she was treated for pneumonia left upper lobe previously last year in late November and CAT scan shows left upper lobe infiltrate she did not have any lung mass now she was more confused family brought her to the hospital she does recognize me and she is getting better but now she is on oxygen she is chronically on home oxygen 2 L nasal cannula and also she is on Inogen portable oxygen and she was supposed to come and visit me to repeat chest x-ray now the CAT scan shows left upper lobe density nodule probably infection and also Deprizine the trachea most likely mucous now she is on 4 L nasal cannula  CAT scan of the chest done on this admission shows left upper lobe nodular density and also has left lower lobe 9 mm lung nodule right apical infiltrate groundglass  opacity and COPD changes  At home she is Using her inhalers regularly and also nebulizer treatment despite of that is complaining of cough congestion finished rounds of antibiotic and steroids still continues to have cough  She is having upper respiratory symptoms cough congestion shortness of breath wheezing continue to use the nebulizer .  Discussed with the patient and the family showed him the CAT scan of the chest regarding the left upper lobe lung nodule if her condition does not improve probably will do bronchoscopy on Monday depending on performance status and if the family willing to get treatment.  Will schedule for bronchoscopy on Monday  Patient is on Symbicort and Spiriva  Continue with the nebulizer treatment she was taking nebulizer 3-4 times a day will add Mucomyst  I discussed with the patient the current status of their pulmonary disease.   Patient was getting Zithromax and Rocephin will change it to meropenem as she is allergic to penicillin  Transfuse prn to maintain Hgb > 7  Labs to follow electrolytes, renal function and and blood counts  Bronchial hygiene with respiratory therapy techniques, bronchodilators  Pt needs IV fluids with additives and Drug therapy requiring intensive monitoring for toxicity  Prescription drug management with home med reconciliation reviewed  DVT, SUP prophylaxis       3/22  Patient is sleepy this morning as patient was premedicated in anticipation of getting MRI done which was not done according to the daughter Rox she is alert awake and doing well conversation she is hard of hearing but her confusion much improved, discussed with her in detail about the lung finding lung mass nodule she in agreement to getting bronchoscopy done also discussed with her about the possibility of treatment and palliative care she is in agreement but she wants to get bronchoscopy done we will keep the patient in the hospital we will try to get bronchoscopy done on Monday and also she  is having neurowork-up which is still pending and also the GI  3/23  Patient is more alert awake today discussed with her and the daughter at the bedside regarding bronchoscopy we will try to coordinate with GI to do the procedure with the help of anesthesia, discussed with the patient side effect pros and cons regarding the bronchoscopy explained to the daughter and the patient both in agreement, episode of bradycardia which were resolved    [x] High complexity decision making was performed  [x] See my orders for details    PMH:  has a past medical history of Benign essential HTN, Bilateral hearing loss, unspecified hearing loss type, COPD, moderate (HCC), Depression, controlled, Elevated fasting blood sugar, Elevated serum creatinine, Hypercalcemia, and Hypercholesteremia.    PSH:   has a past surgical history that includes Cholecystectomy (03/2016) and Tubal ligation.     FHX: family history includes Heart Failure in her father and mother; Stroke in her mother.     SHX:  reports that she has never smoked. She has never used smokeless tobacco. She reports that she does not drink alcohol and does not use drugs.    ALL:   Allergies   Allergen Reactions    Penicillins      Other reaction(s): Unknown (comments)        MEDS:   [x] Reviewed - As Below   [] Not reviewed    Current Facility-Administered Medications   Medication Dose Route Frequency Provider Last Rate Last Admin    clotrimazole-betamethasone (LOTRISONE) cream 1 each  1 each Topical BID PRN Ольга Ingram MD        atorvastatin (LIPITOR) tablet 10 mg  10 mg Oral Daily Ольга Ingram MD   10 mg at 03/22/25 2050    azithromycin (ZITHROMAX) 500 mg in sodium chloride 0.9 % 250 mL IVPB ([id number])  500 mg IntraVENous Q24H Ольга Ingram  mL/hr at 03/23/25 0959 500 mg at 03/23/25 0959    sodium chloride flush 0.9 % injection 5-40 mL  5-40 mL IntraVENous 2 times per day Ольга Ingram MD   10 mL at 03/22/25  bilaterally   Abd: soft, NT, hypoactive BS  Ext: no edema; no joint swelling; No clubbing  : NO anthony, clear urine  Neuro: No focal neurological  Psych- unable to assess  Skin: warm, dry, no cyanosis;   Pulses: 1-2+ Bilateral pedal, radial  Capillary: brisk; pale      DATA:  No results found for this or any previous visit.    No results found for this or any previous visit.       MAR reviewed and pertinent medications noted or modified as needed    MEDS:   Current Facility-Administered Medications   Medication Dose Route Frequency Provider Last Rate Last Admin    clotrimazole-betamethasone (LOTRISONE) cream 1 each  1 each Topical BID PRN Ольга Ingram MD        atorvastatin (LIPITOR) tablet 10 mg  10 mg Oral Daily Ольга Ingram MD   10 mg at 03/22/25 2050    azithromycin (ZITHROMAX) 500 mg in sodium chloride 0.9 % 250 mL IVPB ([id number])  500 mg IntraVENous Q24H Ольга Ingram  mL/hr at 03/23/25 0959 500 mg at 03/23/25 0959    sodium chloride flush 0.9 % injection 5-40 mL  5-40 mL IntraVENous 2 times per day Ольга Ingram MD   10 mL at 03/22/25 2055    sodium chloride flush 0.9 % injection 5-40 mL  5-40 mL IntraVENous PRN Ольга Ingram MD        0.9 % sodium chloride infusion   IntraVENous PRN Ольга Ingram MD        ondansetron (ZOFRAN-ODT) disintegrating tablet 4 mg  4 mg Oral Q8H PRN Ольга Ingram MD        Or    ondansetron (ZOFRAN) injection 4 mg  4 mg IntraVENous Q6H PRN Ольга Ingram MD        polyethylene glycol (GLYCOLAX) packet 17 g  17 g Oral Daily PRN Ольга Ingram MD        meropenem (MERREM) 500 mg in sodium chloride 0.9 % 100 mL IVPB (addEASE)  500 mg IntraVENous Q12H Josiah Martin MD   Stopped at 03/23/25 0148    ipratropium 0.5 mg-albuterol 2.5 mg (DUONEB) nebulizer solution 1 Dose  1 Dose Inhalation Q6H RT Josiah Martin MD   1 Dose at 03/23/25 0797

## 2025-03-23 NOTE — PLAN OF CARE
Problem: Discharge Planning  Goal: Discharge to home or other facility with appropriate resources  Outcome: Progressing     Problem: Skin/Tissue Integrity  Goal: Skin integrity remains intact  Description: 1.  Monitor for areas of redness and/or skin breakdown  2.  Assess vascular access sites hourly  3.  Every 4-6 hours minimum:  Change oxygen saturation probe site  4.  Every 4-6 hours:  If on nasal continuous positive airway pressure, respiratory therapy assess nares and determine need for appliance change or resting period  3/23/2025 0804 by Meghan Medina RN  Outcome: Progressing  Flowsheets (Taken 3/23/2025 0802)  Skin Integrity Remains Intact: Monitor for areas of redness and/or skin breakdown  3/23/2025 0536 by Magdalena Campos RN  Outcome: Progressing     Problem: Safety - Adult  Goal: Free from fall injury  3/23/2025 0804 by Meghan Medina, RN  Outcome: Progressing  3/23/2025 0536 by Magdalena Campos, RN  Outcome: Progressing     Problem: ABCDS Injury Assessment  Goal: Absence of physical injury  Outcome: Progressing     Problem: Nutrition Deficit:  Goal: Optimize nutritional status  Outcome: Progressing

## 2025-03-23 NOTE — PLAN OF CARE
PHYSICAL THERAPY TREATMENT     Patient: Lori Lambert (89 y.o. female)  Date: 3/23/2025  Diagnosis: Altered mental status, unspecified altered mental status type [R41.82]  Acute metabolic encephalopathy [G93.41]  Left upper lobe pulmonary nodule [R91.1] Acute metabolic encephalopathy  Procedure(s) (LRB):  ESOPHAGOGASTRODUODENOSCOPY (N/A)    Precautions: Fall Risk, Bed Alarm (delirium risk)                      Recommendations for nursing mobility: Out of bed to chair for meals, Frequent repositioning to prevent skin breakdown, and Assist x1    In place during session: Peripheral IV, Nasal Cannula 3L, and External Catheter  Chart, physical therapy assessment, plan of care and goals were reviewed.  ASSESSMENT  Patient continues with skilled PT services and is progressing towards goals. Pt semi supine upon PT arrival, agreeable to session. Pt A&O x self. (See below for objective details and assist levels).     Overall pt tolerated session fair today with bed mobility, transfers and seated therex. Pt demo's improved functional mobility to date requiring less A. Performed bed mobility with increased time and CGA, good sitting balance noted EOB. Performed sit to stand with CG-min A and VC for UE push off. Able to initiate gait training with mostly min A for line management, no overt LOB or knee buckling noted. Able to demo good safety awareness with descent to recliner. Participated in seated therex, pt noted with increased fatigue and SOB, discontinued further mobility and pt left seated with all needs in reach. Discussed current level of function with daughter, requesting pt receive continuous PT treatment during stay.  Will continue to benefit from skilled PT services, and will continue to progress as tolerated. Current PT DC recommendation Continue to assess pending progress once medically appropriate.     Start of Session End of Session   SPO2 (%) 94 95   Heart Rate (BPM) 110 103     GOALS:    Problem:  Mobility Training:  Bed Mobility:  Bed Mobility Training  Supine to Sit: Contact guard assistance  Scooting: Contact guard assistance  Transfers:  Transfer Training  Sit to Stand: Minimal assistance;Contact guard assistance  Stand to Sit: Contact guard assistance  Bed to Chair: Contact guard assistance  Balance:  Balance  Sitting - Static: Fair (occasional)  Sitting - Dynamic: Fair (occasional)  Standing: Impaired;With support  Standing - Static: Fair;Constant support  Standing - Dynamic: Fair;Constant support  Wheelchair Mobility:     Ambulation/Gait Training:                                Gait  Gait Training: Yes  Overall Level of Assistance: Contact guard assistance  Distance (ft): 5 Feet  Assistive Device: Walker, rolling                     Therapeutic Exercises:       EXERCISE   Sets   Reps   Active Active Assist   Passive Self ROM   Comments   Ankle Pumps 1 10 [x] [] [] []    Quad Sets/Glut Sets   [] [] [] []    Hamstring Sets   [] [] [] []    Short Arc Quads   [] [] [] []    Heel Slides   [] [] [] []    Straight Leg Raises   [] [] [] []    Hip abd/add   [] [] [] []    Long Arc Quads   [] [] [] []    Marching 1 10 [x] [] [] []    Seated HR/TR   [] [] [] []       [] [] [] []       Pain Ratin/10  reported  Pain Intervention(s):   pain is at a level acceptable to the patient    Activity Tolerance:   Fair , Poor, requires frequent rest breaks, and observed shortness of breath on exertion    After treatment patient left in no apparent distress:   Bed locked and returned to lowest position, Patient left in no apparent distress sitting up in chair, Call bell within reach, and Caregiver / family present, and nsg updated     COMMUNICATION/COLLABORATION:   The patient’s plan of care was discussed with: Registered nurse    Patient Education  Education Provided: Plan of Care;Transfer Training;Mobility Training;Family Education;Equipment  Education Method: Verbal;Demonstration  Barriers to Learning:

## 2025-03-23 NOTE — PROGRESS NOTES
Hospitalist Progress Note    NAME: Lori Lambert   :  1932   MRN:  [id number]            Subjective:     Chief Complaint / Reason for Physician Visit Confusion  Patient seen and evaluated at bedside, overnight events reviewed, patient currently has no new complaint, is at baseline mental status.  Discussed with RN events overnight.     Review of Systems:  Symptom Y/N Comments  Symptom Y/N Comments   Fever/Chills N   Chest Pain N    Poor Appetite Y   Edema N    Cough N   Abdominal Pain N    Sputum N   Joint Pain N    SOB/MERRITT N   Pruritis/Rash N    Nausea/vomit N   Tolerating PT/OT NA    Diarrhea N   Tolerating Diet Y    Constipation N   Other       Could NOT obtain due to:      Objective:     VITALS:   Last 24hrs VS reviewed since prior progress note. Most recent are:  [unfilled]  No intake or output data in the 24 hours ending 25 1046     PHYSICAL EXAM:  General: Patient appears comfortable    EENT:  EOMI. Anicteric sclerae. MMM  Resp:  CTA bilaterally, no wheezing or rales.  No accessory muscle use  CV:  Regular  rhythm, s1/s2 no m/r/g No edema  GI:  Soft, Non distended, Non tender.  +Bowel sounds  Neurologic:  Alert and oriented X 3, normal speech,   Psych:   Good insight. Not anxious nor agitated  Skin:  No rashes.  No jaundice    Procedures: see electronic medical records for all procedures/Xrays and details which were not copied into this note but were reviewed prior to creation of Plan.      LABS:  I reviewed today's most current labs and imaging studies.  Pertinent labs include:  Recent Labs     25  1249 25  0539 25  0733   WBC 13.1* 10.2 10.6   HGB 7.9* 7.9* 7.3*   HCT 26.6* 25.9* 24.7*   * 459* 395     Recent Labs     25  1249 25  0539 25  0733 25  0842    138 140 143   K 4.3 4.1 4.2 4.2    105 108 110*   CO2 30 28 31 30   BUN 48* 35* 24* 20   MG 1.9 1.9 2.0 2.1   ALT 17 14 12 11*   INR 1.1  --   --   --        Signed: Ольга Li  Shiraz Ingram MD    Medical Decision Making:    Labs reviewed by myself   CBC/CMP    Diagnostic data reviewed by myself   EKG/Telemetry strip, consistent with NSR    Toxic drug monitoring    Lovenox, monitor for HIT, daily CBC    Discussed case with   Case Management In IDRs    MDM Discussion: Patient with numerous medical comorbidities, each with increased risk for mortality and morbidity if left untreated. Patient requires medications with high risk of toxicity and need  for intensive monitoring. I have reviewed patient's presenting subjective and objective findings, as well as all laboratory studies, imaging studies, and vital signs to date as well as treatment rendered and patient's response to those treatments. In addition, prior medical, surgical and relevant social and family histories were reviewed.     This is dictation was done by dragon, computer voice recognition software. Quite often unanticipated grammatical, syntax, homophones and other interpretive errors or inadvertently transcribed by the computer software. Please excuse errors that have escaped final proofreading. Thank you.      Reviewed most current lab test results and cultures  YES  Reviewed most current radiology test results   YES  Review and summation of old records today    NO  Reviewed patient's current orders and MAR    YES  PMH/ reviewed - no change compared to H&P          Assessment / Plan:  Altered mental status  Metabolic encephalopathy  Aphasia  Of note patient CT head negative, patient back to baseline mental status, symptomatology could be secondary to infectious etiologies as well as metabolic encephalopathy patient unable to tolerate MRI brain  Obtain repeat CT head to rule out CVA  Continue aspirin once daily  Continue Lipitor once daily  Aspiration seizure precautions  Physical therapy Occupational Therapy evaluation  Continue to monitor patient's clinical status    Bradycardia-of note patient noted to have evidence of

## 2025-03-24 ENCOUNTER — APPOINTMENT (OUTPATIENT)
Facility: HOSPITAL | Age: 89
DRG: 177 | End: 2025-03-24
Attending: INTERNAL MEDICINE
Payer: MEDICARE

## 2025-03-24 LAB
ALBUMIN SERPL-MCNC: 2.3 G/DL (ref 3.5–5)
ALBUMIN/GLOB SERPL: 0.8 (ref 1.1–2.2)
ALP SERPL-CCNC: 70 U/L (ref 45–117)
ALT SERPL-CCNC: 12 U/L (ref 12–78)
ANION GAP SERPL CALC-SCNC: 2 MMOL/L (ref 2–12)
AST SERPL W P-5'-P-CCNC: 11 U/L (ref 15–37)
BASOPHILS # BLD: 0.03 K/UL (ref 0–0.1)
BASOPHILS NFR BLD: 0.3 % (ref 0–1)
BILIRUB SERPL-MCNC: 0.4 MG/DL (ref 0.2–1)
BUN SERPL-MCNC: 18 MG/DL (ref 6–20)
BUN/CREAT SERPL: 18 (ref 12–20)
CA-I BLD-MCNC: 9.8 MG/DL (ref 8.5–10.1)
CHLORIDE SERPL-SCNC: 109 MMOL/L (ref 97–108)
CO2 SERPL-SCNC: 31 MMOL/L (ref 21–32)
CREAT SERPL-MCNC: 0.98 MG/DL (ref 0.55–1.02)
DIFFERENTIAL METHOD BLD: ABNORMAL
ECHO AO ASC DIAM: 2.9 CM
ECHO AO ASCENDING AORTA INDEX: 1.88 CM/M2
ECHO AO ROOT DIAM: 2.8 CM
ECHO AO ROOT INDEX: 1.82 CM/M2
ECHO AV AREA PEAK VELOCITY: 1.6 CM2
ECHO AV AREA VTI: 1.6 CM2
ECHO AV AREA/BSA PEAK VELOCITY: 1 CM2/M2
ECHO AV AREA/BSA VTI: 1 CM2/M2
ECHO AV MEAN GRADIENT: 9 MMHG
ECHO AV MEAN VELOCITY: 1.4 M/S
ECHO AV PEAK GRADIENT: 17 MMHG
ECHO AV PEAK VELOCITY: 2.1 M/S
ECHO AV VELOCITY RATIO: 0.62
ECHO AV VTI: 44.8 CM
ECHO BSA: 1.56 M2
ECHO EST RA PRESSURE: 3 MMHG
ECHO IVC EXP: 1.3 CM
ECHO LA AREA 2C: 19.8 CM2
ECHO LA AREA 4C: 14.2 CM2
ECHO LA DIAMETER INDEX: 1.62 CM/M2
ECHO LA DIAMETER: 2.5 CM
ECHO LA MAJOR AXIS: 5.8 CM
ECHO LA MINOR AXIS: 6 CM
ECHO LA TO AORTIC ROOT RATIO: 0.89
ECHO LA VOL BP: 38 ML (ref 22–52)
ECHO LA VOL MOD A2C: 52 ML (ref 22–52)
ECHO LA VOL MOD A4C: 28 ML (ref 22–52)
ECHO LA VOL/BSA BIPLANE: 25 ML/M2 (ref 16–34)
ECHO LA VOLUME INDEX MOD A2C: 34 ML/M2 (ref 16–34)
ECHO LA VOLUME INDEX MOD A4C: 18 ML/M2 (ref 16–34)
ECHO LV E' LATERAL VELOCITY: 7.07 CM/S
ECHO LV E' SEPTAL VELOCITY: 5.44 CM/S
ECHO LV EDV A2C: 67 ML
ECHO LV EDV A4C: 58 ML
ECHO LV EDV INDEX A4C: 38 ML/M2
ECHO LV EDV NDEX A2C: 44 ML/M2
ECHO LV EF PHYSICIAN: 64 %
ECHO LV EJECTION FRACTION A2C: 65 %
ECHO LV EJECTION FRACTION A4C: 63 %
ECHO LV EJECTION FRACTION BIPLANE: 64 % (ref 55–100)
ECHO LV ESV A2C: 23 ML
ECHO LV ESV A4C: 21 ML
ECHO LV ESV INDEX A2C: 15 ML/M2
ECHO LV ESV INDEX A4C: 14 ML/M2
ECHO LV FRACTIONAL SHORTENING: 30 % (ref 28–44)
ECHO LV INTERNAL DIMENSION DIASTOLE INDEX: 2.79 CM/M2
ECHO LV INTERNAL DIMENSION DIASTOLIC: 4.3 CM (ref 3.9–5.3)
ECHO LV INTERNAL DIMENSION SYSTOLIC INDEX: 1.95 CM/M2
ECHO LV INTERNAL DIMENSION SYSTOLIC: 3 CM
ECHO LV IVSD: 1.2 CM (ref 0.6–0.9)
ECHO LV MASS 2D: 173.6 G (ref 67–162)
ECHO LV MASS INDEX 2D: 112.8 G/M2 (ref 43–95)
ECHO LV POSTERIOR WALL DIASTOLIC: 1.1 CM (ref 0.6–0.9)
ECHO LV RELATIVE WALL THICKNESS RATIO: 0.51
ECHO LVOT AREA: 2.5 CM2
ECHO LVOT AV VTI INDEX: 0.62
ECHO LVOT DIAM: 1.8 CM
ECHO LVOT MEAN GRADIENT: 4 MMHG
ECHO LVOT PEAK GRADIENT: 7 MMHG
ECHO LVOT PEAK VELOCITY: 1.3 M/S
ECHO LVOT STROKE VOLUME INDEX: 45.9 ML/M2
ECHO LVOT SV: 70.7 ML
ECHO LVOT VTI: 27.8 CM
ECHO MV A VELOCITY: 1.37 M/S
ECHO MV AREA VTI: 1.7 CM2
ECHO MV E DECELERATION TIME (DT): 412 MS
ECHO MV E VELOCITY: 1 M/S
ECHO MV E/A RATIO: 0.73
ECHO MV E/E' LATERAL: 14.14
ECHO MV E/E' RATIO (AVERAGED): 16.26
ECHO MV E/E' SEPTAL: 18.38
ECHO MV LVOT VTI INDEX: 1.47
ECHO MV MAX VELOCITY: 1.7 M/S
ECHO MV MEAN GRADIENT: 5 MMHG
ECHO MV MEAN VELOCITY: 1.1 M/S
ECHO MV PEAK GRADIENT: 12 MMHG
ECHO MV VTI: 41 CM
ECHO PV MAX VELOCITY: 0.9 M/S
ECHO PV MEAN GRADIENT: 2 MMHG
ECHO PV MEAN VELOCITY: 0.6 M/S
ECHO PV PEAK GRADIENT: 3 MMHG
ECHO PV VTI: 18.1 CM
ECHO RA AREA 4C: 12.8 CM2
ECHO RA END SYSTOLIC VOLUME APICAL 4 CHAMBER INDEX BSA: 18 ML/M2
ECHO RA VOLUME: 27 ML
ECHO RIGHT VENTRICULAR SYSTOLIC PRESSURE (RVSP): 30 MMHG
ECHO RV BASAL DIMENSION: 3.6 CM
ECHO RV FREE WALL PEAK S': 16.9 CM/S
ECHO RV TAPSE: 2.1 CM (ref 1.7–?)
ECHO TV REGURGITANT MAX VELOCITY: 2.58 M/S
ECHO TV REGURGITANT PEAK GRADIENT: 27 MMHG
EOSINOPHIL # BLD: 1.09 K/UL (ref 0–0.4)
EOSINOPHIL NFR BLD: 11.8 % (ref 0–7)
ERYTHROCYTE [DISTWIDTH] IN BLOOD BY AUTOMATED COUNT: 16.7 % (ref 11.5–14.5)
GLOBULIN SER CALC-MCNC: 2.8 G/DL (ref 2–4)
GLUCOSE SERPL-MCNC: 85 MG/DL (ref 65–100)
HCT VFR BLD AUTO: 23.4 % (ref 35–47)
HGB BLD-MCNC: 6.9 G/DL (ref 11.5–16)
IMM GRANULOCYTES # BLD AUTO: 0.03 K/UL (ref 0–0.04)
IMM GRANULOCYTES NFR BLD AUTO: 0.3 % (ref 0–0.5)
LYMPHOCYTES # BLD: 0.99 K/UL (ref 0.8–3.5)
LYMPHOCYTES NFR BLD: 10.8 % (ref 12–49)
MAGNESIUM SERPL-MCNC: 2 MG/DL (ref 1.6–2.4)
MCH RBC QN AUTO: 25.1 PG (ref 26–34)
MCHC RBC AUTO-ENTMCNC: 29.5 G/DL (ref 30–36.5)
MCV RBC AUTO: 85.1 FL (ref 80–99)
MONOCYTES # BLD: 0.92 K/UL (ref 0–1)
MONOCYTES NFR BLD: 10 % (ref 5–13)
NEUTS SEG # BLD: 6.15 K/UL (ref 1.8–8)
NEUTS SEG NFR BLD: 66.8 % (ref 32–75)
NRBC # BLD: 0 K/UL (ref 0–0.01)
NRBC BLD-RTO: 0 PER 100 WBC
PLATELET # BLD AUTO: 293 K/UL (ref 150–400)
PMV BLD AUTO: 11.1 FL (ref 8.9–12.9)
POTASSIUM SERPL-SCNC: 4.1 MMOL/L (ref 3.5–5.1)
PROT SERPL-MCNC: 5.1 G/DL (ref 6.4–8.2)
RBC # BLD AUTO: 2.75 M/UL (ref 3.8–5.2)
RBC MORPH BLD: ABNORMAL
RBC MORPH BLD: ABNORMAL
SODIUM SERPL-SCNC: 142 MMOL/L (ref 136–145)
WBC # BLD AUTO: 9.2 K/UL (ref 3.6–11)

## 2025-03-24 PROCEDURE — 6370000000 HC RX 637 (ALT 250 FOR IP): Performed by: INTERNAL MEDICINE

## 2025-03-24 PROCEDURE — 94761 N-INVAS EAR/PLS OXIMETRY MLT: CPT

## 2025-03-24 PROCEDURE — 36415 COLL VENOUS BLD VENIPUNCTURE: CPT

## 2025-03-24 PROCEDURE — 36430 TRANSFUSION BLD/BLD COMPNT: CPT

## 2025-03-24 PROCEDURE — 80053 COMPREHEN METABOLIC PANEL: CPT

## 2025-03-24 PROCEDURE — 30233N1 TRANSFUSION OF NONAUTOLOGOUS RED BLOOD CELLS INTO PERIPHERAL VEIN, PERCUTANEOUS APPROACH: ICD-10-PCS | Performed by: INTERNAL MEDICINE

## 2025-03-24 PROCEDURE — 85025 COMPLETE CBC W/AUTO DIFF WBC: CPT

## 2025-03-24 PROCEDURE — 2580000003 HC RX 258: Performed by: INTERNAL MEDICINE

## 2025-03-24 PROCEDURE — P9016 RBC LEUKOCYTES REDUCED: HCPCS

## 2025-03-24 PROCEDURE — 2700000000 HC OXYGEN THERAPY PER DAY

## 2025-03-24 PROCEDURE — 97530 THERAPEUTIC ACTIVITIES: CPT

## 2025-03-24 PROCEDURE — 93306 TTE W/DOPPLER COMPLETE: CPT

## 2025-03-24 PROCEDURE — 86901 BLOOD TYPING SEROLOGIC RH(D): CPT

## 2025-03-24 PROCEDURE — 86850 RBC ANTIBODY SCREEN: CPT

## 2025-03-24 PROCEDURE — 1100000000 HC RM PRIVATE

## 2025-03-24 PROCEDURE — 86900 BLOOD TYPING SEROLOGIC ABO: CPT

## 2025-03-24 PROCEDURE — 97165 OT EVAL LOW COMPLEX 30 MIN: CPT

## 2025-03-24 PROCEDURE — 83735 ASSAY OF MAGNESIUM: CPT

## 2025-03-24 PROCEDURE — 6360000002 HC RX W HCPCS: Performed by: INTERNAL MEDICINE

## 2025-03-24 PROCEDURE — 86923 COMPATIBILITY TEST ELECTRIC: CPT

## 2025-03-24 PROCEDURE — 2500000003 HC RX 250 WO HCPCS: Performed by: INTERNAL MEDICINE

## 2025-03-24 PROCEDURE — 99223 1ST HOSP IP/OBS HIGH 75: CPT

## 2025-03-24 PROCEDURE — 94640 AIRWAY INHALATION TREATMENT: CPT

## 2025-03-24 RX ORDER — SODIUM CHLORIDE 9 MG/ML
INJECTION, SOLUTION INTRAVENOUS PRN
Status: DISCONTINUED | OUTPATIENT
Start: 2025-03-24 | End: 2025-03-27 | Stop reason: HOSPADM

## 2025-03-24 RX ADMIN — Medication 2 PUFF: at 07:48

## 2025-03-24 RX ADMIN — MEROPENEM 500 MG: 500 INJECTION INTRAVENOUS at 23:05

## 2025-03-24 RX ADMIN — SODIUM CHLORIDE, PRESERVATIVE FREE 10 ML: 5 INJECTION INTRAVENOUS at 09:41

## 2025-03-24 RX ADMIN — VERAPAMIL HYDROCHLORIDE 120 MG: 120 TABLET ORAL at 09:41

## 2025-03-24 RX ADMIN — IPRATROPIUM BROMIDE AND ALBUTEROL SULFATE 1 DOSE: 2.5; .5 SOLUTION RESPIRATORY (INHALATION) at 07:48

## 2025-03-24 RX ADMIN — AZITHROMYCIN MONOHYDRATE 500 MG: 500 INJECTION, POWDER, LYOPHILIZED, FOR SOLUTION INTRAVENOUS at 10:41

## 2025-03-24 RX ADMIN — PANTOPRAZOLE SODIUM 40 MG: 40 TABLET, DELAYED RELEASE ORAL at 05:49

## 2025-03-24 RX ADMIN — IPRATROPIUM BROMIDE AND ALBUTEROL SULFATE 1 DOSE: 2.5; .5 SOLUTION RESPIRATORY (INHALATION) at 13:33

## 2025-03-24 RX ADMIN — VERAPAMIL HYDROCHLORIDE 120 MG: 120 TABLET ORAL at 21:24

## 2025-03-24 RX ADMIN — MEROPENEM 500 MG: 500 INJECTION INTRAVENOUS at 14:09

## 2025-03-24 RX ADMIN — SODIUM CHLORIDE, PRESERVATIVE FREE 10 ML: 5 INJECTION INTRAVENOUS at 21:24

## 2025-03-24 RX ADMIN — BUSPIRONE HYDROCHLORIDE 5 MG: 5 TABLET ORAL at 21:42

## 2025-03-24 RX ADMIN — ACETYLCYSTEINE 600 MG: 200 SOLUTION ORAL; RESPIRATORY (INHALATION) at 07:48

## 2025-03-24 RX ADMIN — ATORVASTATIN CALCIUM 10 MG: 20 TABLET, FILM COATED ORAL at 21:24

## 2025-03-24 RX ADMIN — ACETYLCYSTEINE 600 MG: 200 SOLUTION ORAL; RESPIRATORY (INHALATION) at 20:20

## 2025-03-24 RX ADMIN — IPRATROPIUM BROMIDE AND ALBUTEROL SULFATE 1 DOSE: 2.5; .5 SOLUTION RESPIRATORY (INHALATION) at 20:20

## 2025-03-24 RX ADMIN — Medication 2 PUFF: at 20:48

## 2025-03-24 ASSESSMENT — PAIN SCALES - GENERAL
PAINLEVEL_OUTOF10: 0

## 2025-03-24 NOTE — CARE COORDINATION
CM reviewed chart. CM met with patient and family. Declined therapy rec, gave preference for , referral sent. CM will continue to follow.

## 2025-03-24 NOTE — PROGRESS NOTES
IMPRESSION:   Acute hypoxic respiratory failure  Chronic hypoxic respiratory failure  J44.9 Chronic obstructive pulmonary disease, unspecified   I10 Essential (primary) hypertension   M54.9 Dorsalgia, unspecified   J96.10 Chronic respiratory failure, unspecified whether with hypoxia or hypercapnia   K21.9 Gastro-esophageal reflux disease without esophagitis   J18.9 Pneumonia, unspecified organism  Left upper lobe lung nodule rule out pneumonia versus mass  Additional workup outlined below  Pt is at high risk of sudden decline and decompensation with life threatening consequenses and continued end organ dysfunction and failure  Pt is critically ill. Time spent with pt and staff actively rendering care, managing pt and coordinating care as stated below; 30  minutes, exclusive of any procedures      RECOMMENDATIONS/PLAN:     50-year-old lady followed with me regularly regarding her COPD status came in because of shortness of breath altered mental status confusion she was brought to the emergency room and discharged treated for pneumonia with Levaquin but her condition got worse she got more confused unable to recognize anyone came back to the hospital she was treated for pneumonia left upper lobe previously last year in late November and CAT scan shows left upper lobe infiltrate she did not have any lung mass now she was more confused family brought her to the hospital she does recognize me and she is getting better but now she is on oxygen she is chronically on home oxygen 2 L nasal cannula and also she is on In[employer] portable oxygen and she was supposed to come and visit me to repeat chest x-ray now the CAT scan shows left upper lobe density nodule probably infection and also Deprizine the trachea most likely mucous now she is on 4 L nasal cannula  CAT scan of the chest done on this admission shows left upper lobe nodular density and also has left lower lobe 9 mm lung nodule right apical infiltrate groundglass  Electronically signed by Jakub Claros    XR CHEST PORTABLE  Result Date: 3/20/2025  EXAM:  XR CHEST PORTABLE INDICATION: AMS COMPARISON: Chest radiograph 3/17/2025, CT chest 9/3/2024 TECHNIQUE: Upright portable chest AP view FINDINGS: The cardiac silhouette is within normal limits. The pulmonary vasculature is within normal limits. 22 mm left upper lobe nodular opacity. Pleural spaces are clear. The visualized bones and upper abdomen are age-appropriate.     Left upper lobe nodular opacity. Dedicated chest CT recommended for further characterization.. Electronically signed by RODRIGO WHITLOCK    CT HEAD WO CONTRAST  Result Date: 3/20/2025  EXAM:  CT HEAD WO CONTRAST INDICATION: Altered mental status COMPARISON: None TECHNIQUE: Noncontrast head CT. Coronal and sagittal reformats. CT dose reduction was achieved through use of a standardized protocol tailored for this examination and automatic exposure control for dose modulation. FINDINGS: The ventricles and sulci are age-appropriate without hydrocephalus. There is no mass effect or midline shift. There is no intracranial hemorrhage or extra-axial fluid collection. There is no abnormal area of decreased density to suggest infarct. There is age-indeterminate microvascular ischemic change in the periventricular white matter. The basal cisterns are patent. The osseous structures are intact. The visualized paranasal sinuses and mastoid air cells are clear.     No acute intracranial abnormality. Electronically signed by Nelson Cabrera       This care involved high complexity decision making which includes independently reviewing the patient's past medical records, current laboratory results, medication profiles that were immediately available to me and actual Xray images at the bedside in order to assess, support vital system function, and to treat this degree of vital organ system failure, and to prevent further life threatening deterioration of the patient’s

## 2025-03-24 NOTE — PROGRESS NOTES
CARDIOLOGY PROGRESS NOTE      Patient Name: Lori Lambert  Age: 89 y.o.  Gender:female  :1932  MRN: [id number]    Patient seen and examined. This is a patient with a history of HTN, COPD, HTN, HLD, and CKD who presented with AMS now being followed for questionable bradycardia. Resting comfortably in bed. Denies chest pain and shortness of breath. No other complaints reported.    Telemetry reviewed, there were no events noted in the past 24 hours.    Pertinent review of systems items noted above, all other systems are negative. Current medications reviewed.    Physical Examination    Allergies   Allergen Reactions    Penicillins      Other reaction(s): Unknown (comments)     Vitals:    25 1333   BP:    Pulse: 69   Resp: 18   Temp:    SpO2: 97%     Vital signs are stable  No apparent distress.  Heart has a RRR  Lungs are diminished  Abdomen is soft, nontender, normal bowel sounds.  Extremities have no edema  Skin is dry and warm.  Normal affect    Labs reviewed:  Recent Results (from the past 12 hours)   CBC with Auto Differential    Collection Time: 25  6:07 AM   Result Value Ref Range    WBC 9.2 3.6 - 11.0 K/uL    RBC 2.75 (L) 3.80 - 5.20 M/uL    Hemoglobin 6.9 (L) 11.5 - 16.0 g/dL    Hematocrit 23.4 (L) 35.0 - 47.0 %    MCV 85.1 80.0 - 99.0 FL    MCH 25.1 (L) 26.0 - 34.0 PG    MCHC 29.5 (L) 30.0 - 36.5 g/dL    RDW 16.7 (H) 11.5 - 14.5 %    Platelets 293 150 - 400 K/uL    MPV 11.1 8.9 - 12.9 FL    Nucleated RBCs 0.0 0.0  WBC    nRBC 0.00 0.00 - 0.01 K/uL    Neutrophils % 66.8 32.0 - 75.0 %    Lymphocytes % 10.8 (L) 12.0 - 49.0 %    Monocytes % 10.0 5.0 - 13.0 %    Eosinophils % 11.8 (H) 0.0 - 7.0 %    Basophils % 0.3 0.0 - 1.0 %    Immature Granulocytes % 0.3 0 - 0.5 %    Neutrophils Absolute 6.15 1.80 - 8.00 K/UL    Lymphocytes Absolute 0.99 0.80 - 3.50 K/UL    Monocytes Absolute 0.92 0.00 - 1.00 K/UL    Eosinophils Absolute 1.09 (H) 0.00 - 0.40 K/UL    Basophils Absolute 0.03  Gradient 9 mmHg    AV VTI 44.8 cm    AV Mean Velocity 1.4 m/s    AV Peak Velocity 2.1 m/s    AV Peak Gradient 17 mmHg    AV Area by VTI 1.6 cm2    AV Area by Peak Velocity 1.6 cm2    Aortic Root 2.8 cm    Ascending Aorta 2.9 cm    IVC Expiration 1.3 cm    MV E Wave Deceleration Time 412.0 ms    MV A Velocity 1.37 m/s    MV E Velocity 1.00 m/s    MV Mean Gradient 5 mmHg    MV VTI 41.0 cm    MV Mean Velocity 1.1 m/s    MV Max Velocity 1.7 m/s    MV Peak Gradient 12 mmHg    MV Area by VTI 1.7 cm2    PV Mean Gradient 2 mmHg    PV VTI 18.1 cm    PV Mean Velocity 0.6 m/s    PV Max Velocity 0.9 m/s    PV Peak Gradient 3 mmHg    RV Basal Dimension 3.6 cm    RV Free Wall Peak S' 16.9 cm/s    TAPSE 2.1 >=1.7 cm    TR Max Velocity 2.58 m/s    TR Peak Gradient 27 mmHg    Fractional Shortening 2D 30 28 - 44 %    LV ESV Index A4C 14 mL/m2    LV EDV Index A4C 38 mL/m2    LV ESV Index A2C 15 mL/m2    LV EDV Index A2C 44 mL/m2    LVIDd Index 2.79 cm/m2    LVIDs Index 1.95 cm/m2    LV RWT Ratio 0.51     LV Mass 2D 173.6 (A) 67 - 162 g    LV Mass 2D Index 112.8 (A) 43 - 95 g/m2    MV E/A 0.73     E/E' Ratio (Averaged) 16.26     E/E' Lateral 14.14     E/E' Septal 18.38     LA Volume Index BP 25 16 - 34 ml/m2    LVOT Stroke Volume Index 45.9 mL/m2    LA Volume Index MOD A2C 34 16 - 34 ml/m2    LA Volume Index MOD A4C 18 16 - 34 ml/m2    LA Size Index 1.62 cm/m2    LA/AO Root Ratio 0.89     RA Volume Index A4C 18 mL/m2    Ao Root Index 1.82 cm/m2    Ascending Aorta Index 1.88 cm/m2    AV Velocity Ratio 0.62     LVOT:AV VTI Index 0.62     DES/BSA VTI 1.0 cm2/m2    DES/BSA Peak Velocity 1.0 cm2/m2    MV:LVOT VTI Index 1.47     Est. RA Pressure 3 mmHg    RVSP 30 mmHg    EF Physician 64 %      Case discussed with Dr. Alexander and our impression and recommendations are as follows:  Bradycardia, resolved   EKG with NSR   Serial ECGs   Continue telemetry  TSH WNL    Echo with EF 60-65%, NWM   Keep electrolytes WNL, K+ 4-5, Mg >2   Avoid AV tucker  blocking agents   AMS, CT head negative. Unable to tolerate MRI brain. Continue asa, statin   Pneumonia,   CT chest with pulmonary findings concerning for malignancy. Superimposed infectious/inflammatory process cannot be excluded  Continue IV abx, steroids, nebs per primary team   CKD, Cr at baseline     Stable from cardiac standpoint  Will follow peripherally     Please do not hesitate to call if additional questions arise.    MARICARMEN THOMAS, APRN - NP  3/24/2025

## 2025-03-24 NOTE — PROGRESS NOTES
Hospitalist Progress Note    NAME: Lori Lambert   :  1932   MRN:  [id number]            Subjective:     Chief Complaint / Reason for Physician Visit Confusion  Patient seen and evaluated at bedside, overnight events reviewed, patient currently has no new complaints, patient was scheduled to have endoscopy as well as bronchoscopy however is high risk for anesthesia, patient's family to decide with regards to continuing with procedures.  Discussed with RN events overnight.     Review of Systems:  Symptom Y/N Comments  Symptom Y/N Comments   Fever/Chills N   Chest Pain N    Poor Appetite Y   Edema N    Cough N   Abdominal Pain N    Sputum N   Joint Pain N    SOB/MERRITT N   Pruritis/Rash N    Nausea/vomit N   Tolerating PT/OT NA    Diarrhea N   Tolerating Diet Y    Constipation N   Other       Could NOT obtain due to:      Objective:     VITALS:   Last 24hrs VS reviewed since prior progress note. Most recent are:  [unfilled]    Intake/Output Summary (Last 24 hours) at 3/24/2025 1144  Last data filed at 3/23/2025 2110  Gross per 24 hour   Intake 100 ml   Output --   Net 100 ml        PHYSICAL EXAM:  General: Patient appears comfortable    EENT:  EOMI. Anicteric sclerae. MMM  Resp:  CTA bilaterally, no wheezing or rales.  No accessory muscle use  CV:  Regular  rhythm, s1/s2 no m/r/g No edema  GI:  Soft, Non distended, Non tender.  +Bowel sounds  Neurologic:  Alert and oriented X 3, normal speech,   Psych:   Good insight. Not anxious nor agitated  Skin:  No rashes.  No jaundice    Procedures: see electronic medical records for all procedures/Xrays and details which were not copied into this note but were reviewed prior to creation of Plan.      LABS:  I reviewed today's most current labs and imaging studies.  Pertinent labs include:  Recent Labs     25  0733 25  0842 25  0607   WBC 10.6 10.8 9.2   HGB 7.3* 6.8* 6.9*   HCT 24.7* 23.6* 23.4*    340 293     Recent Labs     25  0733  seizure precautions  Physical therapy Occupational Therapy evaluation  Continue to monitor patient's clinical status    Bradycardia-of note patient noted to have evidence of bradycardia, rapid response was called, heart rate was in the 50s  Avoid AV tucker blocking agents  Continuous telemetry monitoring  Cardiology consult appreciated, continue to follow recommendations    Pneumonia  Bilateral lung masses  Chronic respiratory failure with hypoxia  COPD  Follow blood cultures  Follow sputum cultures  Patient scheduled for bronchoscopy today however patient high risk for anesthesia, patient's family to decide with regards to going through with procedure, currently postponed  Continue meropenem azithromycin for broad-spectrum antibiotic coverage  Pulmonology consult further evaluation    History of chronic kidney disease stage III-serum creatinine currently remains at baseline  Continue to trend serum creatinine    Anemia - of note patient had a Hb drop yesterday, initially patient and family refused prbc transfusion however now agreeable. Verbal consent obtained from patient.  Transfuse 1uprbc, obtain post transfusion CBC to assess for appropriate response  Continue to trend Hb/Hct    Dysphagia  Inability to swallow  Gastroenterology consult appreciated, patient to undergo upper endoscopy today however patient deemed high risk for procedure, currently postponed until family makes decision      Prophylaxis-SCDs  FEN-dysphagia diet, n.p.o. after midnight, replete potassium magnesium  DNR, will obtain Palliative care assistance with regards to overall goals of care/disposition, surrogate decision maker is patients daughter    Disposition-pending clinical improvement, culture results, PT OT evaluation, pulmonology, neurology and cardiology clearance clearance, Palliative eval anticipate 24-48 hours    18.5 - 24.9 Normal weight / Body mass index is 23.44 kg/m².    Code status: Full code  Prophylaxis: SCD's  Recommended

## 2025-03-24 NOTE — PLAN OF CARE
OCCUPATIONAL THERAPY EVALUATION  Patient: Lori Lambert (89 y.o. female)  Date: 3/24/2025  Primary Diagnosis: Altered mental status, unspecified altered mental status type [R41.82]  Acute metabolic encephalopathy [G93.41]  Left upper lobe pulmonary nodule [R91.1]  Procedure(s) (LRB):  ESOPHAGOGASTRODUODENOSCOPY (N/A)     Precautions: Fall Risk, Bed Alarm                Recommendations for nursing mobility: Out of bed to chair for meals, Use of bed/chair alarm for safety, Use of BSC for toileting , and Assist x1    In place during session:Peripheral IV and Nasal Cannula 3L  ASSESSMENT  Pt is a 89 y.o. female presenting to [facility] with c/o shortness of breath, and confusion admitted 3/20/35 and currently being treated for altered mental status, metabolic encephalopathy, aphasia, bradycardia, pneumonia, bilateral lung masses, chronic respiratory failure with hypoxia, COPD. Pt received semi-supine in bed upon arrival, AXO x3-4, and agreeable to OT evaluation.     Based on current observations, pt presents with decreased  functional mobility, independence in ADLs, high-level IADLs, strength, activity tolerance, endurance, safety awareness, balance (see below for objective details and assist levels).     Overall, pt tolerates session fair with inc fatigue with mobility. Pt transferring from semi supine to sitting up at EOB with inc time and CGA. Pt donning b/l socks sitting at EOB with min A and inc time. Pt transferring into standing with use of RW and min A. Pt taking 3-4 steps to pivot transfer to bedside commode, mild unsteadiness with mobility noted and requiring assist due to 1-2 LOB. Pt requiring safety cues for hand placement with transfer onto bedside commode. Pt completing kim care with SBA and vcs to initiate. Pt then washing hands with set up A. Pt transferring into standing with min A  and ambulating around bed to recliner chair. Pt mildly incontinent with mobility and requiring max A posterior kim care  retraining, patient education, home safety training, and family training/education    Recommend next OT session: LB dressing    Frequency/Duration: Patient will be followed by occupational therapy:  3-5x/week to address goals.    Recommendation for discharge: (in order for the patient to meet his/her long term goals)  Moderate intensity short-term skilled occupational therapy up to 5x/week    Potential barriers for safe discharge: pt has poor safety awareness, pt is a high fall risk, pt is not safe to be alone, and concern for pt safely navigating or managing the home environment.     IF patient discharges home will need the following DME: patient owns DME required for discharge     SUBJECTIVE:   Patient stated “Thanks for helping!”    OBJECTIVE DATA SUMMARY:     Past Medical History:   Diagnosis Date    Benign essential HTN     Bilateral hearing loss, unspecified hearing loss type     COPD, moderate (HCC)     Depression, controlled     Elevated fasting blood sugar     Elevated serum creatinine     Hypercalcemia     Hypercholesteremia      Past Surgical History:   Procedure Laterality Date    CHOLECYSTECTOMY  03/2016    Dr. Tineo    TUBAL LIGATION          Expanded or extensive additional review of patient history:   Lives With: Daughter, Family (daughter, SERGIO, adult granddaughter, 24/7  supervision by family)  Type of Home: House  Home Layout: Able to Live on Main level with bedroom/bathroom              Bathroom Toilet: Bedside commode  Bathroom Equipment:  (completes sponge baths)     Home Equipment: Wheelchair - Manual, Rollator, Electric scooter, Oxygen (normally uses no AD)     Social/Functional History  Lives With: Daughter, Family (daughter, SERGIO, adult granddaughter, 24/7  supervision by family)  Type of Home: House  Home Layout: Able to Live on Main level with bedroom/bathroom  Bathroom Toilet: Bedside commode  Bathroom Equipment:  (completes sponge baths)  Home Equipment: Wheelchair - Manual, Rollator,

## 2025-03-24 NOTE — PLAN OF CARE
Problem: Discharge Planning  Goal: Discharge to home or other facility with appropriate resources  Outcome: Progressing  Flowsheets (Taken 3/24/2025 0930)  Discharge to home or other facility with appropriate resources: Identify barriers to discharge with patient and caregiver     Problem: Skin/Tissue Integrity  Goal: Skin integrity remains intact  Description: 1.  Monitor for areas of redness and/or skin breakdown  2.  Assess vascular access sites hourly  3.  Every 4-6 hours minimum:  Change oxygen saturation probe site  4.  Every 4-6 hours:  If on nasal continuous positive airway pressure, respiratory therapy assess nares and determine need for appliance change or resting period  Outcome: Progressing  Flowsheets (Taken 3/24/2025 1141)  Skin Integrity Remains Intact: Monitor for areas of redness and/or skin breakdown     Problem: Safety - Adult  Goal: Free from fall injury  Outcome: Progressing     Problem: ABCDS Injury Assessment  Goal: Absence of physical injury  Outcome: Progressing     Problem: Nutrition Deficit:  Goal: Optimize nutritional status  Outcome: Progressing

## 2025-03-24 NOTE — PLAN OF CARE
Problem: Discharge Planning  Goal: Discharge to home or other facility with appropriate resources  3/23/2025 2139 by Mejia Cho RN  Outcome: Progressing  Flowsheets (Taken 3/23/2025 0949 by Meghan Medina RN)  Discharge to home or other facility with appropriate resources: Identify barriers to discharge with patient and caregiver  3/23/2025 0804 by Meghan Medina RN  Outcome: Progressing     Problem: Skin/Tissue Integrity  Goal: Skin integrity remains intact  Description: 1.  Monitor for areas of redness and/or skin breakdown  2.  Assess vascular access sites hourly  3.  Every 4-6 hours minimum:  Change oxygen saturation probe site  4.  Every 4-6 hours:  If on nasal continuous positive airway pressure, respiratory therapy assess nares and determine need for appliance change or resting period  3/23/2025 2139 by Mejia Cho RN  Outcome: Progressing  Flowsheets (Taken 3/23/2025 0949 by Meghan Medina RN)  Skin Integrity Remains Intact: Monitor for areas of redness and/or skin breakdown  3/23/2025 0804 by Meghan Medina RN  Outcome: Progressing  Flowsheets (Taken 3/23/2025 0802)  Skin Integrity Remains Intact: Monitor for areas of redness and/or skin breakdown     Problem: Safety - Adult  Goal: Free from fall injury  3/23/2025 2139 by Mejia Cho RN  Outcome: Progressing  3/23/2025 0804 by Meghan Medina RN  Outcome: Progressing     Problem: ABCDS Injury Assessment  Goal: Absence of physical injury  3/23/2025 2139 by Mejia Cho RN  Outcome: Progressing  3/23/2025 0804 by Meghan Medina RN  Outcome: Progressing     Problem: Physical Therapy - Adult  Goal: By Discharge: Performs mobility at highest level of function for planned discharge setting.  See evaluation for individualized goals.  Description: FUNCTIONAL STATUS PRIOR TO ADMISSION: Patient was supervision using a rolling walker for functional mobility, and home O2.    HOME SUPPORT PRIOR TO ADMISSION: The patient lives with daughter,

## 2025-03-24 NOTE — PLAN OF CARE
PHYSICAL THERAPY TREATMENT     Patient: Lori Lambert (89 y.o. female)  Date: 3/24/2025  Diagnosis: Altered mental status, unspecified altered mental status type [R41.82]  Acute metabolic encephalopathy [G93.41]  Left upper lobe pulmonary nodule [R91.1] Acute metabolic encephalopathy  Procedure(s) (LRB):  ESOPHAGOGASTRODUODENOSCOPY (N/A)    Precautions: Fall Risk, Bed Alarm   Recommendations for nursing mobility: Out of bed to chair for meals, Use of bed/chair alarm for safety, AD and gt belt for bed to chair , Amb to bathroom with AD and gait belt, and Assist x1    In place during session: Peripheral IV and EKG/telemetry   Chart, physical therapy assessment, plan of care and goals were reviewed.  ASSESSMENT  Patient continues with skilled PT services and is progressing towards goals. Pt sleeping in bedside recliner upon PT arrival, agreeable to session. Pt A&O x person, place, and time; daughter and son in law present throughout session. (See below for objective details and assist levels).     Overall pt tolerated session fair today with no c/o pain throughout session. Pt required CGA with additional time to min A for sit <> stand transfers. Pt amb 12 feet with RW, gt belt, and CGA; demonstrates NBOS with short, shuffling, step through gt pattern with no LOB or knee buckling noted. CGA to min A for stand to sit transfers for stand to sit transfers due to sitting while holding on to RW, verbal cues for sit to stand with use of grab bar. Pt amb 8 feet from bathroom to bed with RW, gt belt, and CGA. Will continue to benefit from skilled PT services, and will continue to progress as tolerated.  Current PT DC recommendation Intermittent physical therapy up to 2-3x/week in previous living setting with 24/7 assistance from family once medically appropriate.    GOALS:    Problem: Physical Therapy - Adult  Goal: By Discharge: Performs mobility at highest level of function for planned discharge setting.  See  need for safety;Decreased awareness of need for assistance  Problem Solving: Impaired  Insights: Decreased awareness of deficits  Initiation: Requires cues for some  Sequencing: Requires cues for some    Functional Mobility Training:  Bed Mobility:  Bed Mobility Training  Bed Mobility Training: Yes  Supine to Sit: Contact guard assistance  Sit to Supine: Contact guard assistance  Scooting: Contact guard assistance  Transfers:  Transfer Training  Transfer Training: Yes  Sit to Stand: Contact guard assistance;Minimal assistance  Stand to Sit: Contact guard assistance;Minimal assistance  Toilet Transfer: Minimal assistance  Balance:  Balance  Sitting: Impaired  Sitting - Static: Good (unsupported)  Sitting - Dynamic: Fair (occasional)  Standing: Impaired  Standing - Static: Fair;Constant support  Standing - Dynamic: Fair;Constant support     Ambulation/Gait Training:    Gait  Gait Training: Yes  Overall Level of Assistance: Contact guard assistance  Distance (ft): 20 Feet (12 feet chair to commode, 8 feet commode to bed)  Assistive Device: Walker, rolling  Base of Support: Narrowed  Speed/Saida: Slow       Therapeutic Exercises:   bed mobility , OOB transfers, amb with AD, seated static and dynamic balance, and standing static and dynamic balance    Pain Ratin/10   Pain Intervention(s):   pain is at a level acceptable to the patient    Activity Tolerance:   Fair , requires rest breaks, observed shortness of breath on exertion, desaturates with activity and requires oxygen, and SPO2 stable on 3L    After treatment patient left in no apparent distress:   Bed locked and returned to lowest position, Patient left in no apparent distress in bed, Call bell within reach, Bed/ chair alarm activated, Caregiver / family present, and Side rails x3, and nsg updated       COMMUNICATION/COLLABORATION:   The patient’s plan of care was discussed with: Occupational therapist, Registered nurse, and Case management    Patient

## 2025-03-24 NOTE — CONSULTS
Palliative Medicine  Patient Name: Lori Lambert  YOB: 1932  MRN: [id number]  Age: 89 y.o.  Gender: female    Date of Initial Consult: 3/24/25  Date of Service: 3/24/2025  Time: 3:47 PM  Provider: GRICELDA Du CNP  Hospital Day: 5  Admit Date: 3/20/2025  Referring Provider: Dr. Ingram        Reasons for Consultation:  Goals of Care    HISTORY OF PRESENT ILLNESS (HPI):   Lori Lambert is a 89 y.o. female with a past medical history of COPD, HTN, CKD III, who was admitted on 3/20/2025 from home with altered mental status. Initially presented to ED on 3/17/25 for URI symptoms, given antibiotics and discharged home as was stable. Presented back to ED with her daughter after at home she had altered mentation and confusion that was very different than her baseline mental status. Significant labs showed Cr 1.27, slightly elevated WBC (13.1, improved from labs on 3/17/15 showing 14/5), anemia with Hgb 7.9. Admitted for further workup of metabolic encephalopathy and broad spectrum antibiotics for concern of pneumonia not resolved by initial oral antibiotics. Initial CT head negative. Mental status slowly improving, albeit having some delirium with waxing/waning mentation. Goal for brain MRI however pt unable to tolerate. Pulmonology on board as pt sees Dr. Martin outpatient pulmonology for COPD. Imaging also revealed left upper and lower lobe nodules concerning for malignancy, possible tracheal nodule. Hospitalization also complicated by dysphagia with failed MBS, bradycardia prompting RRT, waxing/waning mental status. Palliative consulted to assist with goals of care.     Psychosocial: , spouse passed \"many years ago\". Has four children - three sons and one daughter. One son in [place], one son in [place], and one son local here in [place], [place]. Pt lives with her daughter, Aleida and her spouse and daughter. She has lived with Aleida for roughly 18 years. Aleida drives her to/from  appointments, enjoys spending time in her recliner at home watching game shows and watching birds.       PALLIATIVE DIAGNOSES:    Acute on chronic hypoxic respiratory failure with advanced COPD, pneumonia, left lung nodules concerning for malignancy   Metabolic encephalopathy   Dysphagia   Poor oral intake, anorexia   Frailty   Palliative medicine encounter  Goals of care     ASSESSMENT AND PLAN:   Chart reviewed thoroughly prior to seeing pt; including notes, labs, imaging, prior ED visit a few days before admission. Pt seen sitting up in chair, appears fatigued, alert and oriented but do not think she has capacity for complex medical decision making. Unable to tell me why she is here or what brought her to the hospital on my exam.   Assessment required an independent historian, so additional information was obtained from pt daughter, Aleida, and Aleida's spouse, Julio. They are both primary care givers to pt as she has lived with them for 18 years.   Obtained background/baseline information on pt how she has been over the past few weeks to months. She is actually independent with most ADLs, does not assistance with meal prep and food often has to be made into a thickened/pureed consistency at home. Needed some supervision given frailty and generalized weakness but otherwise did not need much assistance per daughter's report. She does share that she has had poor appetite over past few weeks.   Next we talked through current events and hospital trajectory thus far. They find her mental status to be slowly improving. As above, pt has seen pulmonology Dr. Martin for many years and has an excellent relationship with him. They have understanding of current acute issues, but share with me their goal for prioritizing her quality of life. Inquired what quality of life means to her -- which is spending time with family, pets, and watching TV in her recliner at home. They share she has been clear on not wanting to be  patient for cultural preferences / practices and a referral made as appropriate to needs (Cultural Services, Patient Advocacy, Ethics, etc.)    Spiritual Affiliation: Spiritism    Any spiritual / Catholic concerns:  [] Yes /  [x] No   If \"Yes\" to discuss with pastoral care during IDT     Does caregiver feel burdened by caring for their loved one:   [] Yes /  [x] No /  [] No Caregiver Present/Available [] No Caregiver [] Pt Lives at Facility  If \"Yes\" to discuss with social work during IDT    Anticipatory grief assessment:   [x] Normal  / [] Maladaptive     If \"Maladaptive\" to discuss with social work during IDT    ESAS Anxiety:      ESAS Depression:          LAB AND IMAGING FINDINGS:   Objective data reviewed:  labs, images, records, medication use, vitals, and chart     FINAL COMMENTS   Thank you for allowing Palliative Medicine to participate in the care of Lori Lambert.    Only check if applicable and billing time based rather than MDM  [] The total encounter time on this service date was __75__ minutes which was spent performing a face-to-face encounter and personally completing the provider-level activities documented in the note. This includes time spent prior to the visit and after the visit in direct care of the patient. This time does not include time spent in any separately reportable services.    Electronically signed by   GRICELDA Du CNP  Palliative Care Team  on 3/24/2025 at 3:47 PM

## 2025-03-25 LAB
ABO + RH BLD: NORMAL
ALBUMIN SERPL-MCNC: 2.6 G/DL (ref 3.5–5)
ALBUMIN/GLOB SERPL: 0.8 (ref 1.1–2.2)
ALP SERPL-CCNC: 87 U/L (ref 45–117)
ALT SERPL-CCNC: 11 U/L (ref 12–78)
ANION GAP SERPL CALC-SCNC: 3 MMOL/L (ref 2–12)
AST SERPL W P-5'-P-CCNC: 17 U/L (ref 15–37)
BASOPHILS # BLD: 0.04 K/UL (ref 0–0.1)
BASOPHILS NFR BLD: 0.4 % (ref 0–1)
BILIRUB SERPL-MCNC: 0.4 MG/DL (ref 0.2–1)
BLD PROD TYP BPU: NORMAL
BLOOD BANK BLOOD PRODUCT EXPIRATION DATE: NORMAL
BLOOD BANK DISPENSE STATUS: NORMAL
BLOOD BANK ISBT PRODUCT BLOOD TYPE: 5100
BLOOD BANK PRODUCT CODE: NORMAL
BLOOD BANK UNIT TYPE AND RH: NORMAL
BLOOD GROUP ANTIBODIES SERPL: NEGATIVE
BPU ID: NORMAL
BUN SERPL-MCNC: 14 MG/DL (ref 6–20)
BUN/CREAT SERPL: 14 (ref 12–20)
CA-I BLD-MCNC: 10 MG/DL (ref 8.5–10.1)
CHLORIDE SERPL-SCNC: 106 MMOL/L (ref 97–108)
CO2 SERPL-SCNC: 31 MMOL/L (ref 21–32)
CREAT SERPL-MCNC: 1.01 MG/DL (ref 0.55–1.02)
CROSSMATCH RESULT: NORMAL
DIFFERENTIAL METHOD BLD: ABNORMAL
EOSINOPHIL # BLD: 1.13 K/UL (ref 0–0.4)
EOSINOPHIL NFR BLD: 10.5 % (ref 0–7)
ERYTHROCYTE [DISTWIDTH] IN BLOOD BY AUTOMATED COUNT: 16.4 % (ref 11.5–14.5)
GLOBULIN SER CALC-MCNC: 3.3 G/DL (ref 2–4)
GLUCOSE SERPL-MCNC: 87 MG/DL (ref 65–100)
HCT VFR BLD AUTO: 29.2 % (ref 35–47)
HCT VFR BLD AUTO: 29.2 % (ref 35–47)
HGB BLD-MCNC: 8.9 G/DL (ref 11.5–16)
HGB BLD-MCNC: 8.9 G/DL (ref 11.5–16)
IMM GRANULOCYTES # BLD AUTO: 0.05 K/UL (ref 0–0.04)
IMM GRANULOCYTES NFR BLD AUTO: 0.5 % (ref 0–0.5)
LYMPHOCYTES # BLD: 1.27 K/UL (ref 0.8–3.5)
LYMPHOCYTES NFR BLD: 11.8 % (ref 12–49)
MAGNESIUM SERPL-MCNC: 2 MG/DL (ref 1.6–2.4)
MCH RBC QN AUTO: 25.9 PG (ref 26–34)
MCHC RBC AUTO-ENTMCNC: 30.5 G/DL (ref 30–36.5)
MCV RBC AUTO: 85.1 FL (ref 80–99)
MONOCYTES # BLD: 1.14 K/UL (ref 0–1)
MONOCYTES NFR BLD: 10.6 % (ref 5–13)
NEUTS SEG # BLD: 7.17 K/UL (ref 1.8–8)
NEUTS SEG NFR BLD: 66.2 % (ref 32–75)
NRBC # BLD: 0 K/UL (ref 0–0.01)
NRBC BLD-RTO: 0 PER 100 WBC
PLATELET # BLD AUTO: 302 K/UL (ref 150–400)
PMV BLD AUTO: 11.4 FL (ref 8.9–12.9)
POTASSIUM SERPL-SCNC: 3.9 MMOL/L (ref 3.5–5.1)
PROT SERPL-MCNC: 5.9 G/DL (ref 6.4–8.2)
RBC # BLD AUTO: 3.43 M/UL (ref 3.8–5.2)
RBC MORPH BLD: ABNORMAL
RBC MORPH BLD: ABNORMAL
SODIUM SERPL-SCNC: 140 MMOL/L (ref 136–145)
SPECIMEN EXP DATE BLD: NORMAL
TRANSFUSION STATUS PATIENT QL: NORMAL
UNIT DIVISION: 0
UNIT ISSUE DATE/TIME: NORMAL
WBC # BLD AUTO: 10.8 K/UL (ref 3.6–11)

## 2025-03-25 PROCEDURE — 85018 HEMOGLOBIN: CPT

## 2025-03-25 PROCEDURE — 99232 SBSQ HOSP IP/OBS MODERATE 35: CPT

## 2025-03-25 PROCEDURE — 6360000002 HC RX W HCPCS: Performed by: INTERNAL MEDICINE

## 2025-03-25 PROCEDURE — 85025 COMPLETE CBC W/AUTO DIFF WBC: CPT

## 2025-03-25 PROCEDURE — 6370000000 HC RX 637 (ALT 250 FOR IP): Performed by: INTERNAL MEDICINE

## 2025-03-25 PROCEDURE — 83735 ASSAY OF MAGNESIUM: CPT

## 2025-03-25 PROCEDURE — 94761 N-INVAS EAR/PLS OXIMETRY MLT: CPT

## 2025-03-25 PROCEDURE — 94640 AIRWAY INHALATION TREATMENT: CPT

## 2025-03-25 PROCEDURE — 36415 COLL VENOUS BLD VENIPUNCTURE: CPT

## 2025-03-25 PROCEDURE — 1100000000 HC RM PRIVATE

## 2025-03-25 PROCEDURE — 2580000003 HC RX 258: Performed by: INTERNAL MEDICINE

## 2025-03-25 PROCEDURE — 97550 CAREGIVER TRAING 1ST 30 MIN: CPT

## 2025-03-25 PROCEDURE — 2700000000 HC OXYGEN THERAPY PER DAY

## 2025-03-25 PROCEDURE — 85014 HEMATOCRIT: CPT

## 2025-03-25 PROCEDURE — 97530 THERAPEUTIC ACTIVITIES: CPT

## 2025-03-25 PROCEDURE — 92526 ORAL FUNCTION THERAPY: CPT

## 2025-03-25 PROCEDURE — 2500000003 HC RX 250 WO HCPCS: Performed by: INTERNAL MEDICINE

## 2025-03-25 PROCEDURE — 80053 COMPREHEN METABOLIC PANEL: CPT

## 2025-03-25 RX ORDER — DIPHENHYDRAMINE HYDROCHLORIDE AND LIDOCAINE HYDROCHLORIDE AND ALUMINUM HYDROXIDE AND MAGNESIUM HYDRO
5 KIT 4 TIMES DAILY PRN
Status: DISCONTINUED | OUTPATIENT
Start: 2025-03-25 | End: 2025-03-27 | Stop reason: HOSPADM

## 2025-03-25 RX ADMIN — Medication 2 PUFF: at 21:54

## 2025-03-25 RX ADMIN — MEROPENEM 500 MG: 500 INJECTION INTRAVENOUS at 23:18

## 2025-03-25 RX ADMIN — IPRATROPIUM BROMIDE AND ALBUTEROL SULFATE 1 DOSE: 2.5; .5 SOLUTION RESPIRATORY (INHALATION) at 01:29

## 2025-03-25 RX ADMIN — BUSPIRONE HYDROCHLORIDE 5 MG: 5 TABLET ORAL at 20:47

## 2025-03-25 RX ADMIN — PANTOPRAZOLE SODIUM 40 MG: 40 TABLET, DELAYED RELEASE ORAL at 05:45

## 2025-03-25 RX ADMIN — IPRATROPIUM BROMIDE AND ALBUTEROL SULFATE 1 DOSE: 2.5; .5 SOLUTION RESPIRATORY (INHALATION) at 21:36

## 2025-03-25 RX ADMIN — SODIUM CHLORIDE, PRESERVATIVE FREE 10 ML: 5 INJECTION INTRAVENOUS at 08:54

## 2025-03-25 RX ADMIN — VERAPAMIL HYDROCHLORIDE 120 MG: 120 TABLET ORAL at 08:48

## 2025-03-25 RX ADMIN — SODIUM CHLORIDE, PRESERVATIVE FREE 10 ML: 5 INJECTION INTRAVENOUS at 20:56

## 2025-03-25 RX ADMIN — ACETYLCYSTEINE 600 MG: 200 SOLUTION ORAL; RESPIRATORY (INHALATION) at 21:36

## 2025-03-25 RX ADMIN — AZITHROMYCIN MONOHYDRATE 500 MG: 500 INJECTION, POWDER, LYOPHILIZED, FOR SOLUTION INTRAVENOUS at 08:48

## 2025-03-25 RX ADMIN — ACETYLCYSTEINE 600 MG: 200 SOLUTION ORAL; RESPIRATORY (INHALATION) at 05:59

## 2025-03-25 RX ADMIN — IPRATROPIUM BROMIDE AND ALBUTEROL SULFATE 1 DOSE: 2.5; .5 SOLUTION RESPIRATORY (INHALATION) at 13:29

## 2025-03-25 RX ADMIN — ATORVASTATIN CALCIUM 10 MG: 20 TABLET, FILM COATED ORAL at 20:47

## 2025-03-25 RX ADMIN — MEROPENEM 500 MG: 500 INJECTION INTRAVENOUS at 10:17

## 2025-03-25 RX ADMIN — IPRATROPIUM BROMIDE AND ALBUTEROL SULFATE 1 DOSE: 2.5; .5 SOLUTION RESPIRATORY (INHALATION) at 05:58

## 2025-03-25 RX ADMIN — Medication 2 PUFF: at 05:59

## 2025-03-25 RX ADMIN — VERAPAMIL HYDROCHLORIDE 120 MG: 120 TABLET ORAL at 20:47

## 2025-03-25 ASSESSMENT — PAIN SCALES - GENERAL
PAINLEVEL_OUTOF10: 0

## 2025-03-25 NOTE — PLAN OF CARE
Problem: Discharge Planning  Goal: Discharge to home or other facility with appropriate resources  3/25/2025 1946 by Ana Carmona RN  Outcome: Progressing  Flowsheets (Taken 3/25/2025 0947 by Rosalina Connors RN)  Discharge to home or other facility with appropriate resources: Identify barriers to discharge with patient and caregiver  3/25/2025 1000 by Rosalina Connors RN  Outcome: Progressing  Flowsheets (Taken 3/25/2025 0947)  Discharge to home or other facility with appropriate resources: Identify barriers to discharge with patient and caregiver     Problem: Skin/Tissue Integrity  Goal: Skin integrity remains intact  Description: 1.  Monitor for areas of redness and/or skin breakdown  2.  Assess vascular access sites hourly  3.  Every 4-6 hours minimum:  Change oxygen saturation probe site  4.  Every 4-6 hours:  If on nasal continuous positive airway pressure, respiratory therapy assess nares and determine need for appliance change or resting period  3/25/2025 1946 by Ana Carmona RN  Outcome: Progressing  Flowsheets (Taken 3/25/2025 0947 by Rosalina Connors RN)  Skin Integrity Remains Intact: Monitor for areas of redness and/or skin breakdown  3/25/2025 1000 by Rosalina Connors RN  Outcome: Progressing  Flowsheets  Taken 3/25/2025 0947 by Rosalina Connors RN  Skin Integrity Remains Intact: Monitor for areas of redness and/or skin breakdown  Taken 3/24/2025 2333 by Ana Carmona RN  Skin Integrity Remains Intact: Monitor for areas of redness and/or skin breakdown     Problem: Safety - Adult  Goal: Free from fall injury  3/25/2025 1946 by Ana Carmona RN  Outcome: Progressing  Flowsheets (Taken 3/25/2025 0954 by Rosalina Connors RN)  Free From Fall Injury: Instruct family/caregiver on patient safety  3/25/2025 1000 by Rosalina Connors RN  Outcome: Progressing  Flowsheets  Taken 3/25/2025 0954 by Rosalina Connors RN  Free From Fall Injury: Instruct family/caregiver on patient safety  Taken 3/24/2025 2333 by Kristine  at home.     HOME SUPPORT: The patient lived with family  but did not require assistance, had general supervision for safety 24/7.    Occupational Therapy Goals:  Initiated 3/24/2025  Patient/Family stated goal: go home  1.  Patient will perform bathing with Modified [place] within 7 day(s).  2.  Patient will perform upper body dressing with [place] within 7 day(s).  3.  Patient will perform lower body dressing with [place] within 7 day(s).  4.  Patient will perform toilet transfers with [facility] [place]  within 7 day(s).  5.  Patient will perform all aspects of toileting with [facility] [place] within 7 day(s).  6.  Patient will participate in upper extremity therapeutic exercise/activities with [place] within 7 day(s).        3/25/2025 1551 by Sharda Savage OTA  Outcome: Progressing     Problem: Nutrition Deficit:  Goal: Optimize nutritional status  3/25/2025 1946 by Ana Carmona, RN  Outcome: Progressing  Flowsheets (Taken 3/24/2025 2918)  Nutrient intake appropriate for improving, restoring, or maintaining nutritional needs: Assess nutritional status and recommend course of action  3/25/2025 1000 by Rosalina Connors, RN  Outcome: Progressing

## 2025-03-25 NOTE — CARE COORDINATION
CM reviewed chart. DCP home with family and [facility] HH when medicallly ready. CM spoke to patient and family at beside, daughter confirmed choice for HH. CM will continue to follow.

## 2025-03-25 NOTE — PLAN OF CARE
Speech LAnguage Pathology Dysphagia TREATMENT/DISCHARGE    Patient: Lori Lambert (89 y.o. female)  Date: 3/25/2025  Primary Diagnosis: Altered mental status, unspecified altered mental status type [R41.82]  Acute metabolic encephalopathy [G93.41]  Left upper lobe pulmonary nodule [R91.1]  Procedure(s) (LRB):  ESOPHAGOGASTRODUODENOSCOPY (N/A)     Precautions: Aspiration Fall Risk, Bed Alarm                DIET RECOMMENDATIONS: Minced and moist and thin liquids, meds crushed if able in applesauce or pudding,    SWALLOW SAFETY PRECAUTIONS: Rec slow rate of intake, controlled sips/volumes w/ use of straw, small bites/sips, 6 small meals, double swallow, liquid wash, remain upright 1 hour after PO and do not eat 3 hours before bedtime.       ASSESSMENT :  Patient sleeping upon arrival w/ daughter at bedside. Patient unable to maintain alertness for education/swallow strategy education. EGD and bronch canceled today and discussed swallow/feeding goals w/ daughter. Daughter as observed decrease in PO intake and general dislike for thickener. Extensive education regarding MBS findings. Patient likely w/ esophageal dysmotility and educated daughter this cannot be targeted w/ exercises as these muscles are involuntary muscles. Patient has been avoiding rice, tough meats for some time likely s/t chronic esophageal dysphagia. Discussed least restrictive diets that continue to carry aspiration risks but may improve QOL. Daughter would like to d/c mildly thick and resume thin liquids for QOL and understanding aspiration risk. Given patient w/ pharyngoesophageal dysphagia also focused on reducing risk of post prandial aspiration. Provided starter thickener packet for home if daughter decides to change and thicken liquids.   Follow up once patient alert and able to demonstrate feeding strategies w/thin liquids. Demonstrated pinching straw to reduce volume this appears safer than pulling straw as pulling straw appears to

## 2025-03-25 NOTE — CONSULTS
Palliative Medicine  Patient Name: Lori Lambert  YOB: 1932  MRN: [id number]  Age: 89 y.o.  Gender: female    Date of Initial Consult: 3/24/25  Date of Service: 3/25/2025  Time: 10:51 AM  Provider: GRICELDA Du CNP  Hospital Day: 6  Admit Date: 3/20/2025  Referring Provider: Dr. Ingram        Reasons for Consultation:  Goals of Care    HISTORY OF PRESENT ILLNESS (HPI):   Lori Lambert is a 89 y.o. female with a past medical history of COPD, HTN, CKD III, who was admitted on 3/20/2025 from home with altered mental status. Initially presented to ED on 3/17/25 for URI symptoms, given antibiotics and discharged home as was stable. Presented back to ED with her daughter after at home she had altered mentation and confusion that was very different than her baseline mental status. Significant labs showed Cr 1.27, slightly elevated WBC (13.1, improved from labs on 3/17/15 showing 14/5), anemia with Hgb 7.9. Admitted for further workup of metabolic encephalopathy and broad spectrum antibiotics for concern of pneumonia not resolved by initial oral antibiotics. Initial CT head negative. Mental status slowly improving, albeit having some delirium with waxing/waning mentation. Goal for brain MRI however pt unable to tolerate. Pulmonology on board as pt sees Dr. Martin outpatient pulmonology for COPD. Imaging also revealed left upper and lower lobe nodules concerning for malignancy, possible tracheal nodule. Hospitalization also complicated by dysphagia with failed MBS, bradycardia prompting RRT, waxing/waning mental status. Palliative consulted to assist with goals of care.     Psychosocial: , spouse passed \"many years ago\". Has four children - three sons and one daughter. One son in [place], one son in [place], and one son local here in [place], [place]. Pt lives with her daughter, Aleida and her spouse and daughter. She has lived with Aleida for roughly 18 years. Aleida drives her to/from  Palliative Care  [] Home Based Palliative Care  [] Home Based Primary Care  [] Hospice       ADVANCE CARE PLANNING:   [] [facility] Interdisciplinary Team has updated the ACP Navigator with Health Care Decision Maker and Patient Capacity      Primary Decision Maker: Aleida Barrios - [phone]  Confirm Advance Directive: Yes, not on file    Current Code Status: DNR     Goals of Care: Goals of Care and Interventions  Patient/Health Care Proxy Stated Goals: Recovery from acute illness  Medical Interventions: Full interventions  Life Goals  Patient and Family Personal Goals: Recover from acute illness    Please refer to Palliative Medicine ACP notes for further details.    PALLIATIVE ASSESSMENT:      Palliative Performance Scale (PPS):  PPS: 50    ECOG:   ECOG Status : Limited self-care [3]    Modified ESAS:  Modified-[place] Symptom Assessment Scale (ESAS)  Pain Score: No pain  Dyspnea Score: No shortness of breath    Clinical Pain Assessment (nonverbal scale for severity on nonverbal patients):   Clinical Pain Assessment  Severity: 0  Location: N/A  Character: N/A  Duration: N/A  Factors: N/A  Frequency: N/A       NVPS:       RDOS:         Vital Signs: Blood pressure (!) 125/47, pulse 71, temperature 98.2 °F (36.8 °C), temperature source Oral, resp. rate 17, height 1.549 m (5' 0.98\"), weight 56.2 kg (124 lb), SpO2 100%.    PHYSICAL ASSESSMENT:   General: [x] Oriented x3  [] Well appearing  [] Intubated  [x]Ill appearing  []Other: fatigued  Mental Status: [x] Normal mental status exam  [x] Drowsy  [] Confused  []Other: fatigued  Cardiovascular: [x] Regular rate/rhythm  [] Arrhythmia  [] Other:  Chest: [x] Effort normal  []Lungs clear  [] Respiratory distress  []Tachypnea  [] Other: 2L NC, no wheezing, crackles   Abdomen: [x] Soft/non-tender  [] Normal appearance  [] Distended  [] Ascites  [] Other:  Neurological: [x] Normal speech  [] Normal sensation  []Deficits present: [place], follows commands with

## 2025-03-25 NOTE — PLAN OF CARE
OCCUPATIONAL THERAPY TREATMENT  Patient: Lori Lambert (89 y.o. female)  Date: 3/25/2025  Primary Diagnosis: Altered mental status, unspecified altered mental status type [R41.82]  Acute metabolic encephalopathy [G93.41]  Left upper lobe pulmonary nodule [R91.1]  Procedure(s) (LRB):  ESOPHAGOGASTRODUODENOSCOPY (N/A)     Precautions: Fall Risk, Bed Alarm                Recommendations for nursing mobility: Out of bed to chair for meals, Encourage HEP in prep for ADLs/mobility; see handout for details, Frequent repositioning to prevent skin breakdown, Use of bed/chair alarm for safety, Use of BSC for toileting , AD and gt belt for bed to chair , Amb to bathroom with AD and gait belt, and Assist x1    In place during session: Peripheral IV, Nasal Cannula 2L, and EKG/telemetry   Chart, occupational therapy assessment, plan of care, and goals were reviewed.  ASSESSMENT  Patient continues with skilled OT services and is progressing towards goals. Pt seated on BSC upon ALVAREZ arrival, agreeable to session. Pt A&O x 3. Pt had limited insight into situation.  Pt t/f'ed  from bsc to eob w/ cga and rw.  Pt required rest break prior ambulating to sink to complete ADL tasks.  Pt required CGA for sts and walking w/ rw to/from sink. Pt completed oral care, face/hand hygiene and washed hair w/ s/u and vc's for sequencing and safety. Pt required verbal cues to incorporate rest breaks during ADL tasks. Pt returned to eob and dried and combed hair after rest break.  Pt returned to semi supine in bed w/ cga. Discussed d/c plan w/ pt and daughter.  OT recommending additional moderate intensity short term skilled OT however daughter voicing desire home w/ home health. Discussed DME and daughter stating she has everything needed.  Recommended a gait belt d/t decreased standing balance. Daughter verbalized understanding. Pt left semi supine in bed with all known needs met. (See below for objective details and assist levels).  assistance   Grooming Skilled Clinical Factors: s/u to wash face/ hand/ hair and oral care standing at sink. CGA for standing balance. Vc's to take standing rest breaks      Pain Ratin/10   Pain Intervention(s):   pain is at a level acceptable to the patient    Activity Tolerance:   Fair  and requires rest breaks    After treatment patient left in no apparent distress:   Bed locked and returned to lowest position, Patient left in no apparent distress in bed, Call bell within reach, Bed/ chair alarm activated, Caregiver / family present, and Side rails x3, and nsg updated     COMMUNICATION/EDUCATION:   The patient’s plan of care was discussed with: Physical therapy assistant and Registered nurse    Patient Education  Education Given To: Patient;Family  Education Provided: Role of Therapy;Plan of Care;Transfer Training  Education Method: Verbal;Demonstration  Barriers to Learning: Hearing;Cognition  Education Outcome: Continued education needed;Verbalized understanding    Thank you for this referral.  OPAL Madden  Minutes: 40

## 2025-03-25 NOTE — PLAN OF CARE
Problem: Discharge Planning  Goal: Discharge to home or other facility with appropriate resources  3/25/2025 1000 by Rosalina Connors RN  Outcome: Progressing  Flowsheets (Taken 3/25/2025 0947)  Discharge to home or other facility with appropriate resources: Identify barriers to discharge with patient and caregiver  3/24/2025 2328 by Ana Carmona RN  Outcome: Progressing  Flowsheets  Taken 3/24/2025 2100 by Ana Carmona RN  Discharge to home or other facility with appropriate resources: Identify barriers to discharge with patient and caregiver  Taken 3/24/2025 0930 by Meghan Medina RN  Discharge to home or other facility with appropriate resources: Identify barriers to discharge with patient and caregiver     Problem: Skin/Tissue Integrity  Goal: Skin integrity remains intact  Description: 1.  Monitor for areas of redness and/or skin breakdown  2.  Assess vascular access sites hourly  3.  Every 4-6 hours minimum:  Change oxygen saturation probe site  4.  Every 4-6 hours:  If on nasal continuous positive airway pressure, respiratory therapy assess nares and determine need for appliance change or resting period  3/25/2025 1000 by Rosalina Connors RN  Outcome: Progressing  Flowsheets  Taken 3/25/2025 0947 by Rosalina Connors RN  Skin Integrity Remains Intact: Monitor for areas of redness and/or skin breakdown  Taken 3/24/2025 2333 by Ana Carmona RN  Skin Integrity Remains Intact: Monitor for areas of redness and/or skin breakdown  3/24/2025 2328 by Ana Carmona RN  Outcome: Progressing  Flowsheets  Taken 3/24/2025 2100 by Ana Carmona RN  Skin Integrity Remains Intact: Monitor for areas of redness and/or skin breakdown  Taken 3/24/2025 1141 by Meghan Medina RN  Skin Integrity Remains Intact: Monitor for areas of redness and/or skin breakdown     Problem: Safety - Adult  Goal: Free from fall injury  3/25/2025 1000 by Rosalina Connors RN  Outcome: Progressing  Flowsheets  Taken 3/25/2025 0954 by Dory

## 2025-03-25 NOTE — PLAN OF CARE
Problem: Discharge Planning  Goal: Discharge to home or other facility with appropriate resources  3/24/2025 2328 by Ana Carmona RN  Outcome: Progressing  Flowsheets (Taken 3/24/2025 0930 by Meghan Medina, RN)  Discharge to home or other facility with appropriate resources: Identify barriers to discharge with patient and caregiver  3/24/2025 1142 by Meghan Medina RN  Outcome: Progressing  Flowsheets (Taken 3/24/2025 0930)  Discharge to home or other facility with appropriate resources: Identify barriers to discharge with patient and caregiver     Problem: Skin/Tissue Integrity  Goal: Skin integrity remains intact  Description: 1.  Monitor for areas of redness and/or skin breakdown  2.  Assess vascular access sites hourly  3.  Every 4-6 hours minimum:  Change oxygen saturation probe site  4.  Every 4-6 hours:  If on nasal continuous positive airway pressure, respiratory therapy assess nares and determine need for appliance change or resting period  3/24/2025 2328 by Ana Carmona RN  Outcome: Progressing  Flowsheets (Taken 3/24/2025 1141 by Meghan Medina RN)  Skin Integrity Remains Intact: Monitor for areas of redness and/or skin breakdown  3/24/2025 1142 by Meghan Medina RN  Outcome: Progressing  Flowsheets (Taken 3/24/2025 1141)  Skin Integrity Remains Intact: Monitor for areas of redness and/or skin breakdown     Problem: Safety - Adult  Goal: Free from fall injury  3/24/2025 2328 by Ana Carmona RN  Outcome: Progressing  Flowsheets (Taken 3/24/2025 2328)  Free From Fall Injury: Instruct family/caregiver on patient safety  3/24/2025 1142 by Meghan Medina RN  Outcome: Progressing     Problem: ABCDS Injury Assessment  Goal: Absence of physical injury  3/24/2025 2328 by Ana Carmona RN  Outcome: Progressing  Flowsheets (Taken 3/24/2025 2328)  Absence of Physical Injury: Implement safety measures based on patient assessment  3/24/2025 1142 by Meghan Medina RN  Outcome: Progressing     Problem:  perform all aspects of toileting with Modified [place] within 7 day(s).  6.  Patient will participate in upper extremity therapeutic exercise/activities with [place] within 7 day(s).        3/24/2025 1344 by Isha Ocasio OT  Outcome: Progressing     Problem: Nutrition Deficit:  Goal: Optimize nutritional status  3/24/2025 2328 by Ana Carmona, RN  Flowsheets (Taken 3/24/2025 2328)  Nutrient intake appropriate for improving, restoring, or maintaining nutritional needs: Assess nutritional status and recommend course of action  3/24/2025 1142 by Meghan Medina RN  Outcome: Progressing

## 2025-03-25 NOTE — PROGRESS NOTES
IMPRESSION:   Acute hypoxic respiratory failure  Chronic hypoxic respiratory failure  J44.9 Chronic obstructive pulmonary disease, unspecified   I10 Essential (primary) hypertension   M54.9 Dorsalgia, unspecified   J96.10 Chronic respiratory failure, unspecified whether with hypoxia or hypercapnia   K21.9 Gastro-esophageal reflux disease without esophagitis   J18.9 Pneumonia, unspecified organism  Left upper lobe lung nodule rule out pneumonia versus mass  Additional workup outlined below  Pt is at high risk of sudden decline and decompensation with life threatening consequenses and continued end organ dysfunction and failure  Pt is critically ill. Time spent with pt and staff actively rendering care, managing pt and coordinating care as stated below; 30  minutes, exclusive of any procedures      RECOMMENDATIONS/PLAN:     50-year-old lady followed with me regularly regarding her COPD status came in because of shortness of breath altered mental status confusion she was brought to the emergency room and discharged treated for pneumonia with Levaquin but her condition got worse she got more confused unable to recognize anyone came back to the hospital she was treated for pneumonia left upper lobe previously last year in late November and CAT scan shows left upper lobe infiltrate she did not have any lung mass now she was more confused family brought her to the hospital she does recognize me and she is getting better but now she is on oxygen she is chronically on home oxygen 2 L nasal cannula and also she is on [employer] portable oxygen and she was supposed to come and visit me to repeat chest x-ray now the CAT scan shows left upper lobe density nodule probably infection and also Deprizine the trachea most likely mucous now she is on 4 L nasal cannula  CAT scan of the chest done on this admission shows left upper lobe nodular density and also has left lower lobe 9 mm lung nodule right apical infiltrate groundglass       Comment: Not Detected results do not preclude SARS-CoV-2 infection and should not be used as the sole basis for patient management decisions. Results must be combined with clinical observations, patient history, and epidemiological information        Rapid Influenza A By PCR Not Detected        Rapid Influenza B By PCR Not Detected        Comment:    Testing was performed using guicho Rhonda SARS-CoV-2 and Influenza A/B nucleic acid assay.  This test is a multiplex Real-Time Reverse Transcriptas         Culture, Respiratory [[id number]] Collected: 03/21/25 1100    Order Status: Canceled Specimen: Sputum Expectorated     Urine Culture [[id number]] Collected: 03/20/25 1739    Order Status: Completed Specimen: Urine Updated: 03/22/25 1100     Special Requests No Special Requests        Culture No Growth (<1000 cfu/mL)              Imaging:  CT HEAD WO CONTRAST   Final Result   No evidence of acute intracranial abnormality.            Electronically signed by DINH PAREDES      FL MODIFIED BARIUM SWALLOW W VIDEO   Final Result   Modified barium swallow as above.  Please see the separately   dictated Speech Pathology report for additional details and recommendations.      Electronically signed by VIELKA MEDEIROS      CT CHEST WO CONTRAST   Final Result   Pulmonary findings concerning for malignancy. Superimposed   infectious/inflammatory process cannot be excluded. Clinical and laboratory   correlation along with appropriate follow-up and management as clinically   indicated.      Tracheal debris versus nodule.      Small pericardial effusion.      Prominent coronary artery calcifications with three-vessel disease.      Nodular hepatic contour can be seen in the setting of chronic liver disease.      Indeterminate right superior pole renal lesion.         Electronically signed by Jakub Claros      XR CHEST PORTABLE   Final Result      Left upper lobe nodular opacity. Dedicated chest CT recommended for further    characterization..         Electronically signed by RODRIGO WHITLOCK      CT HEAD WO CONTRAST   Final Result      No acute intracranial abnormality.            Electronically signed by Nelson Cabrera          CT CHEST WO CONTRAST  Result Date: 3/20/2025  EXAM: CT Chest without contrast INDICATION: eval for occult pna TECHNIQUE: Helical CT of the chest was obtained without intravenous contrast. Axial, coronal and sagittal images were created. Dose reduction technique was used including one or more of the following: automated exposure control, adjustment of mA and kV according to patient size, and/or iterative reconstruction. The absence of intravenous contrast reduces the sensitivity for evaluation of the mediastinum, jose, vasculature, and upper abdominal organs. COMPARISON: September 2024 FINDINGS: LUNGS: Left upper lobe nodule measuring 2.8 x 2.4 cm with adjacent satellite micronodules. Nodules at the left base measure up to 9 mm. Right apical consolidation along with lingular groundglass opacities. Emphysematous changes of the lungs. AIRWAYS: Tracheal debris versus nodule. PLEURA: No pleural effusion or pneumothorax. MEDIASTINUM AND JOSE: Right hilar lymph node conglomerate/mass measures up to 3.4 x 1.9 cm incompletely evaluated noncontrast examination. HEART AND PERICARDIUM: Normal in size. Small pericardial effusion. VESSELS: Coronary: Present atherosclerotic calcification, three-vessel disease. Ascending aorta diameter: Normal caliber. Pulmonic trunk: Pulmonic trunk is normal in caliber. CHEST WALL: No threshold enlarged lymph nodes. NECK BASE: No concerning thyroid nodules. VISUALIZED UPPER ABDOMEN: Nodular hepatic contours. Numerous bilateral renal cysts. Punctate nonobstructing right renal calculus. Indeterminate superior pole right renal lesion measuring 1.9 cm. Prior cholecystectomy. BONES: No destructive osseous lesions.     Pulmonary findings concerning for malignancy. Superimposed

## 2025-03-25 NOTE — PROGRESS NOTES
0000 Daughter informed that she changed her mind and not to proceed with the bronchoscopy schedule today, she will talk to the attending provider this morning with regards to her decision in the plan of care for her mother.

## 2025-03-25 NOTE — PROGRESS NOTES
Hospitalist Progress Note    NAME: Lori Lambert   :  1932   MRN:  [id number]            Subjective:     Chief Complaint / Reason for Physician Visit Confusion  Patient seen and evaluated at bedside, overnight events reviewed, patient currently has no new complaints, discussed with patient's daughter, they would like to avoid bronchoscopy as well as endoscopy, want to continue antibiotics, would like to proceed with placement.  Discussed with RN events overnight.     Review of Systems:  Symptom Y/N Comments  Symptom Y/N Comments   Fever/Chills N   Chest Pain N    Poor Appetite Y   Edema N    Cough N   Abdominal Pain N    Sputum N   Joint Pain N    SOB/MERRITT N   Pruritis/Rash N    Nausea/vomit N   Tolerating PT/OT NA    Diarrhea N   Tolerating Diet Y    Constipation N   Other       Could NOT obtain due to:      Objective:     VITALS:   Last 24hrs VS reviewed since prior progress note. Most recent are:  [unfilled]    Intake/Output Summary (Last 24 hours) at 3/25/2025 1204  Last data filed at 3/24/2025 2145  Gross per 24 hour   Intake 355.67 ml   Output --   Net 355.67 ml        PHYSICAL EXAM:  General: Patient appears comfortable    EENT:  EOMI. Anicteric sclerae. MMM  Resp:  CTA bilaterally, no wheezing or rales.  No accessory muscle use  CV:  Regular  rhythm, s1/s2 no m/r/g No edema  GI:  Soft, Non distended, Non tender.  +Bowel sounds  Neurologic:  Alert and oriented X 3, normal speech,   Psych:   Good insight. Not anxious nor agitated  Skin:  No rashes.  No jaundice    Procedures: see electronic medical records for all procedures/Xrays and details which were not copied into this note but were reviewed prior to creation of Plan.      LABS:  I reviewed today's most current labs and imaging studies.  Pertinent labs include:  Recent Labs     25  0842 25  0607 25  0525 25  0919   WBC 10.8 9.2 10.8  --    HGB 6.8* 6.9* 8.9* 8.9*   HCT 23.6* 23.4* 29.2* 29.2*    293 302  --       Recent Labs     03/23/25  0842 03/24/25  0608 03/25/25  0525    142 140   K 4.2 4.1 3.9   * 109* 106   CO2 30 31 31   BUN 20 18 14   MG 2.1 2.0 2.0   ALT 11* 12 11*       Signed: Ольга Ingram MD    Medical Decision Making:    Labs reviewed by myself   CBC/CMP    Diagnostic data reviewed by myself   EKG/Telemetry strip, consistent with NSR    Toxic drug monitoring    Lovenox, monitor for HIT, daily CBC    Discussed case with   Case Management In IDRs    MDM Discussion: Patient with numerous medical comorbidities, each with increased risk for mortality and morbidity if left untreated. Patient requires medications with high risk of toxicity and need  for intensive monitoring. I have reviewed patient's presenting subjective and objective findings, as well as all laboratory studies, imaging studies, and vital signs to date as well as treatment rendered and patient's response to those treatments. In addition, prior medical, surgical and relevant social and family histories were reviewed.     This is dictation was done by dragon, computer voice recognition software. Quite often unanticipated grammatical, syntax, homophones and other interpretive errors or inadvertently transcribed by the computer software. Please excuse errors that have escaped final proofreading. Thank you.      Reviewed most current lab test results and cultures  YES  Reviewed most current radiology test results   YES  Review and summation of old records today    NO  Reviewed patient's current orders and MAR    YES  PMH/SH reviewed - no change compared to H&P          Assessment / Plan:  Altered mental status  Metabolic encephalopathy  Aphasia  Of note patient CT head negative, patient back to baseline mental status, symptomatology could be secondary to infectious etiologies as well as metabolic encephalopathy patient unable to tolerate MRI brain  Repeat CT head negative for any acute intracranial pathologies  Continue  aspirin once daily  Continue Lipitor once daily  Aspiration seizure precautions  Physical therapy Occupational Therapy evaluation  Continue to monitor patient's clinical status    Bradycardia-of note patient noted to have evidence of bradycardia, rapid response was called, heart rate was in the 50s  Avoid AV tucker blocking agents  Continuous telemetry monitoring  Cardiology consult appreciated, continue to follow recommendations    Pneumonia  Bilateral lung masses  Chronic respiratory failure with hypoxia  COPD  Follow blood cultures  Follow sputum cultures  Continue meropenem azithromycin for broad-spectrum antibiotic coverage  Pulmonology consult further evaluation    History of chronic kidney disease stage III-serum creatinine currently remains at baseline  Continue to trend serum creatinine    Anemia -status post 1 unit packed RBC transfusion yesterday  Currently hemoglobin 8.9, remains hemodynamically stable  Continue trend hemoglobin hematocrit    Dysphagia  Inability to swallow  Gastroenterology consult appreciated, patient would like to avoid endoscopy at this time    Goals of care  Disposition planning-will follow-up with palliative care with regards to further assistance with regards to disposition planning as well as goals of care      Prophylaxis-SCDs  FEN-dysphagia diet replete potassium magnesium  DNR, will obtain Palliative care assistance with regards to overall goals of care/disposition, surrogate decision maker is patients daughter    Disposition-pending clinical improvement, culture results, PT OT evaluation, pulmonology, neurology and cardiology clearance clearance, Palliative eval anticipate 24-48 hours    18.5 - 24.9 Normal weight / Body mass index is 23.44 kg/m².    Code status: Full code  Prophylaxis: SCD's  Recommended Disposition:  TBD     ________________________________________________________________________  Care Plan discussed with:    Comments   Patient x    Family      RN x    Care

## 2025-03-26 LAB
ALBUMIN SERPL-MCNC: 2.4 G/DL (ref 3.5–5)
ALBUMIN/GLOB SERPL: 0.8 (ref 1.1–2.2)
ALP SERPL-CCNC: 84 U/L (ref 45–117)
ALT SERPL-CCNC: 9 U/L (ref 12–78)
ANION GAP SERPL CALC-SCNC: 1 MMOL/L (ref 2–12)
AST SERPL W P-5'-P-CCNC: 17 U/L (ref 15–37)
BASOPHILS # BLD: 0.04 K/UL (ref 0–0.1)
BASOPHILS NFR BLD: 0.4 % (ref 0–1)
BILIRUB SERPL-MCNC: 0.4 MG/DL (ref 0.2–1)
BUN SERPL-MCNC: 12 MG/DL (ref 6–20)
BUN/CREAT SERPL: 14 (ref 12–20)
CA-I BLD-MCNC: 10 MG/DL (ref 8.5–10.1)
CHLORIDE SERPL-SCNC: 107 MMOL/L (ref 97–108)
CO2 SERPL-SCNC: 32 MMOL/L (ref 21–32)
CREAT SERPL-MCNC: 0.85 MG/DL (ref 0.55–1.02)
DIFFERENTIAL METHOD BLD: ABNORMAL
EOSINOPHIL # BLD: 1.08 K/UL (ref 0–0.4)
EOSINOPHIL NFR BLD: 11.2 % (ref 0–7)
ERYTHROCYTE [DISTWIDTH] IN BLOOD BY AUTOMATED COUNT: 16.9 % (ref 11.5–14.5)
GLOBULIN SER CALC-MCNC: 3 G/DL (ref 2–4)
GLUCOSE SERPL-MCNC: 92 MG/DL (ref 65–100)
HCT VFR BLD AUTO: 27 % (ref 35–47)
HGB BLD-MCNC: 8.2 G/DL (ref 11.5–16)
IMM GRANULOCYTES # BLD AUTO: 0.03 K/UL (ref 0–0.04)
IMM GRANULOCYTES NFR BLD AUTO: 0.3 % (ref 0–0.5)
LYMPHOCYTES # BLD: 1.16 K/UL (ref 0.8–3.5)
LYMPHOCYTES NFR BLD: 12.1 % (ref 12–49)
MCH RBC QN AUTO: 25.9 PG (ref 26–34)
MCHC RBC AUTO-ENTMCNC: 30.4 G/DL (ref 30–36.5)
MCV RBC AUTO: 85.4 FL (ref 80–99)
MONOCYTES # BLD: 1.08 K/UL (ref 0–1)
MONOCYTES NFR BLD: 11.2 % (ref 5–13)
NEUTS SEG # BLD: 6.21 K/UL (ref 1.8–8)
NEUTS SEG NFR BLD: 64.8 % (ref 32–75)
NRBC # BLD: 0 K/UL (ref 0–0.01)
NRBC BLD-RTO: 0 PER 100 WBC
PLATELET # BLD AUTO: 274 K/UL (ref 150–400)
PMV BLD AUTO: 11.6 FL (ref 8.9–12.9)
POTASSIUM SERPL-SCNC: 4.2 MMOL/L (ref 3.5–5.1)
PROT SERPL-MCNC: 5.4 G/DL (ref 6.4–8.2)
RBC # BLD AUTO: 3.16 M/UL (ref 3.8–5.2)
RBC MORPH BLD: ABNORMAL
SODIUM SERPL-SCNC: 140 MMOL/L (ref 136–145)
WBC # BLD AUTO: 9.6 K/UL (ref 3.6–11)

## 2025-03-26 PROCEDURE — 2500000003 HC RX 250 WO HCPCS: Performed by: INTERNAL MEDICINE

## 2025-03-26 PROCEDURE — 94640 AIRWAY INHALATION TREATMENT: CPT

## 2025-03-26 PROCEDURE — 2580000003 HC RX 258: Performed by: INTERNAL MEDICINE

## 2025-03-26 PROCEDURE — 97530 THERAPEUTIC ACTIVITIES: CPT

## 2025-03-26 PROCEDURE — 6370000000 HC RX 637 (ALT 250 FOR IP): Performed by: INTERNAL MEDICINE

## 2025-03-26 PROCEDURE — 85025 COMPLETE CBC W/AUTO DIFF WBC: CPT

## 2025-03-26 PROCEDURE — 1100000000 HC RM PRIVATE

## 2025-03-26 PROCEDURE — 36415 COLL VENOUS BLD VENIPUNCTURE: CPT

## 2025-03-26 PROCEDURE — 6360000002 HC RX W HCPCS: Performed by: INTERNAL MEDICINE

## 2025-03-26 PROCEDURE — 94761 N-INVAS EAR/PLS OXIMETRY MLT: CPT

## 2025-03-26 PROCEDURE — 80053 COMPREHEN METABOLIC PANEL: CPT

## 2025-03-26 PROCEDURE — 99233 SBSQ HOSP IP/OBS HIGH 50: CPT

## 2025-03-26 PROCEDURE — 2700000000 HC OXYGEN THERAPY PER DAY

## 2025-03-26 RX ORDER — QUETIAPINE FUMARATE 25 MG/1
25 TABLET, FILM COATED ORAL
Status: DISCONTINUED | OUTPATIENT
Start: 2025-03-26 | End: 2025-03-27 | Stop reason: HOSPADM

## 2025-03-26 RX ORDER — CEFUROXIME AXETIL 500 MG/1
500 TABLET ORAL 2 TIMES DAILY
Qty: 14 TABLET | Refills: 0 | Status: SHIPPED | OUTPATIENT
Start: 2025-03-26 | End: 2025-04-02

## 2025-03-26 RX ADMIN — PANTOPRAZOLE SODIUM 40 MG: 40 TABLET, DELAYED RELEASE ORAL at 08:49

## 2025-03-26 RX ADMIN — QUETIAPINE FUMARATE 25 MG: 25 TABLET ORAL at 20:42

## 2025-03-26 RX ADMIN — ATORVASTATIN CALCIUM 10 MG: 20 TABLET, FILM COATED ORAL at 20:42

## 2025-03-26 RX ADMIN — VERAPAMIL HYDROCHLORIDE 120 MG: 120 TABLET ORAL at 08:49

## 2025-03-26 RX ADMIN — SODIUM CHLORIDE, PRESERVATIVE FREE 10 ML: 5 INJECTION INTRAVENOUS at 20:46

## 2025-03-26 RX ADMIN — VERAPAMIL HYDROCHLORIDE 120 MG: 120 TABLET ORAL at 20:43

## 2025-03-26 RX ADMIN — ACETYLCYSTEINE 600 MG: 200 SOLUTION ORAL; RESPIRATORY (INHALATION) at 07:40

## 2025-03-26 RX ADMIN — Medication 5 ML: at 11:52

## 2025-03-26 RX ADMIN — IPRATROPIUM BROMIDE AND ALBUTEROL SULFATE 1 DOSE: 2.5; .5 SOLUTION RESPIRATORY (INHALATION) at 13:46

## 2025-03-26 RX ADMIN — IPRATROPIUM BROMIDE AND ALBUTEROL SULFATE 1 DOSE: 2.5; .5 SOLUTION RESPIRATORY (INHALATION) at 07:40

## 2025-03-26 RX ADMIN — MEROPENEM 500 MG: 500 INJECTION INTRAVENOUS at 10:59

## 2025-03-26 RX ADMIN — Medication 5 ML: at 20:42

## 2025-03-26 RX ADMIN — Medication 2 PUFF: at 07:40

## 2025-03-26 RX ADMIN — SODIUM CHLORIDE, PRESERVATIVE FREE 10 ML: 5 INJECTION INTRAVENOUS at 08:51

## 2025-03-26 ASSESSMENT — PAIN DESCRIPTION - ORIENTATION: ORIENTATION: INNER

## 2025-03-26 ASSESSMENT — PAIN DESCRIPTION - DESCRIPTORS: DESCRIPTORS: ACHING

## 2025-03-26 ASSESSMENT — PAIN SCALES - GENERAL
PAINLEVEL_OUTOF10: 5
PAINLEVEL_OUTOF10: 0
PAINLEVEL_OUTOF10: 0

## 2025-03-26 ASSESSMENT — PAIN DESCRIPTION - LOCATION: LOCATION: MOUTH

## 2025-03-26 NOTE — PROGRESS NOTES
IMPRESSION:   Acute hypoxic respiratory failure  Chronic hypoxic respiratory failure  J44.9 Chronic obstructive pulmonary disease, unspecified   I10 Essential (primary) hypertension   M54.9 Dorsalgia, unspecified   J96.10 Chronic respiratory failure, unspecified whether with hypoxia or hypercapnia   K21.9 Gastro-esophageal reflux disease without esophagitis   J18.9 Pneumonia, unspecified organism  Left upper lobe lung nodule rule out pneumonia versus mass      RECOMMENDATIONS/PLAN:     50-year-old lady followed with me regularly regarding her COPD status came in because of shortness of breath altered mental status confusion she was brought to the emergency room and discharged treated for pneumonia with Levaquin but her condition got worse she got more confused unable to recognize anyone came back to the hospital she was treated for pneumonia left upper lobe previously last year in late November and CAT scan shows left upper lobe infiltrate she did not have any lung mass now she was more confused family brought her to the hospital she does recognize me and she is getting better but now she is on oxygen she is chronically on home oxygen 2 L nasal cannula and also she is on Inogen portable oxygen and she was supposed to come and visit me to repeat chest x-ray now the CAT scan shows left upper lobe density nodule probably infection and also Deprizine the trachea most likely mucous now she is on 4 L nasal cannula  CAT scan of the chest done on this admission shows left upper lobe nodular density and also has left lower lobe 9 mm lung nodule right apical infiltrate groundglass opacity and COPD changes  At home she is Using her inhalers regularly and also nebulizer treatment despite of that is complaining of cough congestion finished rounds of antibiotic and steroids still continues to have cough  She is having upper respiratory symptoms cough congestion shortness of breath wheezing continue to use the nebulizer  CHEST WO CONTRAST  Result Date: 3/20/2025  EXAM: CT Chest without contrast INDICATION: eval for occult pna TECHNIQUE: Helical CT of the chest was obtained without intravenous contrast. Axial, coronal and sagittal images were created. Dose reduction technique was used including one or more of the following: automated exposure control, adjustment of mA and kV according to patient size, and/or iterative reconstruction. The absence of intravenous contrast reduces the sensitivity for evaluation of the mediastinum, jose, vasculature, and upper abdominal organs. COMPARISON: September 2024 FINDINGS: LUNGS: Left upper lobe nodule measuring 2.8 x 2.4 cm with adjacent satellite micronodules. Nodules at the left base measure up to 9 mm. Right apical consolidation along with lingular groundglass opacities. Emphysematous changes of the lungs. AIRWAYS: Tracheal debris versus nodule. PLEURA: No pleural effusion or pneumothorax. MEDIASTINUM AND JOSE: Right hilar lymph node conglomerate/mass measures up to 3.4 x 1.9 cm incompletely evaluated noncontrast examination. HEART AND PERICARDIUM: Normal in size. Small pericardial effusion. VESSELS: Coronary: Present atherosclerotic calcification, three-vessel disease. Ascending aorta diameter: Normal caliber. Pulmonic trunk: Pulmonic trunk is normal in caliber. CHEST WALL: No threshold enlarged lymph nodes. NECK BASE: No concerning thyroid nodules. VISUALIZED UPPER ABDOMEN: Nodular hepatic contours. Numerous bilateral renal cysts. Punctate nonobstructing right renal calculus. Indeterminate superior pole right renal lesion measuring 1.9 cm. Prior cholecystectomy. BONES: No destructive osseous lesions.     Pulmonary findings concerning for malignancy. Superimposed infectious/inflammatory process cannot be excluded. Clinical and laboratory correlation along with appropriate follow-up and management as clinically indicated. Tracheal debris versus nodule. Small pericardial effusion. Prominent  coronary artery calcifications with three-vessel disease. Nodular hepatic contour can be seen in the setting of chronic liver disease. Indeterminate right superior pole renal lesion. Electronically signed by Jakub Claros    XR CHEST PORTABLE  Result Date: 3/20/2025  EXAM:  XR CHEST PORTABLE INDICATION: AMS COMPARISON: Chest radiograph 3/17/2025, CT chest 9/3/2024 TECHNIQUE: Upright portable chest AP view FINDINGS: The cardiac silhouette is within normal limits. The pulmonary vasculature is within normal limits. 22 mm left upper lobe nodular opacity. Pleural spaces are clear. The visualized bones and upper abdomen are age-appropriate.     Left upper lobe nodular opacity. Dedicated chest CT recommended for further characterization.. Electronically signed by RODRIGO WHITLOCK    CT HEAD WO CONTRAST  Result Date: 3/20/2025  EXAM:  CT HEAD WO CONTRAST INDICATION: Altered mental status COMPARISON: None TECHNIQUE: Noncontrast head CT. Coronal and sagittal reformats. CT dose reduction was achieved through use of a standardized protocol tailored for this examination and automatic exposure control for dose modulation. FINDINGS: The ventricles and sulci are age-appropriate without hydrocephalus. There is no mass effect or midline shift. There is no intracranial hemorrhage or extra-axial fluid collection. There is no abnormal area of decreased density to suggest infarct. There is age-indeterminate microvascular ischemic change in the periventricular white matter. The basal cisterns are patent. The osseous structures are intact. The visualized paranasal sinuses and mastoid air cells are clear.     No acute intracranial abnormality. Electronically signed by Nelson Cabrera       This care involved high complexity decision making which includes independently reviewing the patient's past medical records, current laboratory results, medication profiles that were immediately available to me and actual Xray images at the bedside in

## 2025-03-26 NOTE — CONSULTS
Palliative Medicine  Patient Name: Lori Lambert  YOB: 1932  MRN: [id number]  Age: 89 y.o.  Gender: female    Date of Initial Consult: 3/24/25  Date of Service: 3/26/2025  Time: 12:50 PM  Provider: GRICELDA Du CNP  Hospital Day: 7  Admit Date: 3/20/2025  Referring Provider: Dr. Ingram        Reasons for Consultation:  Goals of Care    HISTORY OF PRESENT ILLNESS (HPI):   Lori Lambert is a 89 y.o. female with a past medical history of COPD, HTN, CKD III, who was admitted on 3/20/2025 from home with altered mental status. Initially presented to ED on 3/17/25 for URI symptoms, given antibiotics and discharged home as was stable. Presented back to ED with her daughter after at home she had altered mentation and confusion that was very different than her baseline mental status. Significant labs showed Cr 1.27, slightly elevated WBC (13.1, improved from labs on 3/17/15 showing 14/5), anemia with Hgb 7.9. Admitted for further workup of metabolic encephalopathy and broad spectrum antibiotics for concern of pneumonia not resolved by initial oral antibiotics. Initial CT head negative. Mental status slowly improving, albeit having some delirium with waxing/waning mentation. Goal for brain MRI however pt unable to tolerate. Pulmonology on board as pt sees Dr. Martin outpatient pulmonology for COPD. Imaging also revealed left upper and lower lobe nodules concerning for malignancy, possible tracheal nodule. Hospitalization also complicated by dysphagia with failed MBS, bradycardia prompting RRT, waxing/waning mental status. Palliative consulted to assist with goals of care.     Psychosocial: , spouse passed \"many years ago\". Has four children - three sons and one daughter. One son in [place], one son in [place], and one son local here in [place], [place]. Pt lives with her daughter, Aleida and her spouse and daughter. She has lived with Aleida for roughly 18 years. Aleida drives her to/from

## 2025-03-26 NOTE — PLAN OF CARE
Problem: Discharge Planning  Goal: Discharge to home or other facility with appropriate resources  3/26/2025 1931 by Ana Carmona RN  Outcome: Progressing  Flowsheets (Taken 3/26/2025 1002 by Rosalina Connors RN)  Discharge to home or other facility with appropriate resources: Identify barriers to discharge with patient and caregiver  3/26/2025 1010 by Rosalina Connors RN  Outcome: Progressing  Flowsheets  Taken 3/26/2025 1002 by Rosalina Connors RN  Discharge to home or other facility with appropriate resources: Identify barriers to discharge with patient and caregiver  Taken 3/25/2025 2030 by Ana Carmona RN  Discharge to home or other facility with appropriate resources: Identify barriers to discharge with patient and caregiver     Problem: Skin/Tissue Integrity  Goal: Skin integrity remains intact  Description: 1.  Monitor for areas of redness and/or skin breakdown  2.  Assess vascular access sites hourly  3.  Every 4-6 hours minimum:  Change oxygen saturation probe site  4.  Every 4-6 hours:  If on nasal continuous positive airway pressure, respiratory therapy assess nares and determine need for appliance change or resting period  3/26/2025 1931 by Ana Carmona RN  Outcome: Progressing  Flowsheets (Taken 3/26/2025 1002 by Rosalina Connors RN)  Skin Integrity Remains Intact: Monitor for areas of redness and/or skin breakdown  3/26/2025 1010 by Rosalina Connors RN  Outcome: Progressing  Flowsheets  Taken 3/26/2025 1002 by Rosalina Connors RN  Skin Integrity Remains Intact: Monitor for areas of redness and/or skin breakdown  Taken 3/25/2025 2210 by Ana Carmona RN  Skin Integrity Remains Intact: Monitor for areas of redness and/or skin breakdown  Taken 3/25/2025 2030 by Ana Carmona RN  Skin Integrity Remains Intact: Monitor for areas of redness and/or skin breakdown     Problem: Safety - Adult  Goal: Free from fall injury  3/26/2025 1931 by Ana Carmona RN  Outcome: Progressing  Flowsheets (Taken  3/26/2025 1006 by Rosalina Connors RN)  Free From Fall Injury: Instruct family/caregiver on patient safety  3/26/2025 1010 by Rosalina Connors RN  Outcome: Progressing  Flowsheets  Taken 3/26/2025 1006 by Rosalina Connors RN  Free From Fall Injury: Instruct family/caregiver on patient safety  Taken 3/25/2025 2210 by Ana Carmona RN  Free From Fall Injury: Instruct family/caregiver on patient safety     Problem: ABCDS Injury Assessment  Goal: Absence of physical injury  3/26/2025 1931 by Ana Carmona RN  Outcome: Progressing  Flowsheets (Taken 3/26/2025 1006 by Rosalina Connors RN)  Absence of Physical Injury: Implement safety measures based on patient assessment  3/26/2025 1010 by Rosalina Connors RN  Outcome: Progressing  Flowsheets  Taken 3/26/2025 1006 by Rosalina Connors RN  Absence of Physical Injury: Implement safety measures based on patient assessment  Taken 3/25/2025 2210 by Ana Carmona RN  Absence of Physical Injury: Implement safety measures based on patient assessment     Problem: Physical Therapy - Adult  Goal: By Discharge: Performs mobility at highest level of function for planned discharge setting.  See evaluation for individualized goals.  Description: FUNCTIONAL STATUS PRIOR TO ADMISSION: Patient was supervision using a rolling walker for functional mobility, and home O2.    HOME SUPPORT PRIOR TO ADMISSION: The patient lives with daughter, son-in-law, and adult granddaughter who provide 24/7 supervision in the home and total support for ADL/IADL.    Physical Therapy Goals  Initiated 3/21/2025  Pt stated goal: \"I want to pee on the toilet\"  Pt will be I with LE HEP in 7 days.  Pt will perform bed mobility with Contact Guard Assist in 7 days.  Pt will perform transfers with Contact Guard Assist in 7 days.   Pt will amb 20 feet with LRAD safely with Contact Guard Assist in 7 days.  Pt will demonstrate improvement in dynamic balance from Min A to supervision in 7 days.     3/26/2025 1509 by Heather

## 2025-03-26 NOTE — PROGRESS NOTES
4 Eyes Skin Assessment     NAME:  Lori Lambert  YOB: 1932  MEDICAL RECORD NUMBER:  [id number]    The patient is being assessed for  Shift Handoff    I agree that at least one RN has performed a thorough Head to Toe Skin Assessment on the patient. ALL assessment sites listed below have been assessed.      Areas assessed by both nurses:    Head, Face, Ears, Shoulders, Back, Chest, Arms, Elbows, Hands, Sacrum. Buttock, Coccyx, Ischium, and Legs. Feet and Heels        Does the Patient have a Wound? No noted wound(s)       Jermaine Prevention initiated by RN: Yes  Wound Care Orders initiated by RN: No    Pressure Injury (Stage 3,4, Unstageable, DTI, NWPT, and Complex wounds) if present, place Wound referral order by RN under : No    New Ostomies, if present place, Ostomy referral order under : No     Nurse 1 eSignature: Electronically signed by Rosalina Connors RN on 3/26/25 at 1:13 PM EDT    **SHARE this note so that the co-signing nurse can place an eSignature**    Nurse 2 eSignature: Electronically signed by Monique Mendoza RN on 3/26/25 at 3:23 PM EDT

## 2025-03-26 NOTE — CARE COORDINATION
1439: CM spoke to palliative provider informed that family want home hospice. Cm spoke to patient's daughter, gave choice for home hospice no preference given. Referral sent.    5555: CM reviewed chart. DCP home with family and [facility] when medicallly ready. CM will continue to follow.

## 2025-03-26 NOTE — PLAN OF CARE
Problem: Discharge Planning  Goal: Discharge to home or other facility with appropriate resources  Outcome: Progressing  Flowsheets  Taken 3/26/2025 1002 by Rosalina Connors RN  Discharge to home or other facility with appropriate resources: Identify barriers to discharge with patient and caregiver  Taken 3/25/2025 2030 by Ana Carmona RN  Discharge to home or other facility with appropriate resources: Identify barriers to discharge with patient and caregiver     Problem: Skin/Tissue Integrity  Goal: Skin integrity remains intact  Description: 1.  Monitor for areas of redness and/or skin breakdown  2.  Assess vascular access sites hourly  3.  Every 4-6 hours minimum:  Change oxygen saturation probe site  4.  Every 4-6 hours:  If on nasal continuous positive airway pressure, respiratory therapy assess nares and determine need for appliance change or resting period  Outcome: Progressing  Flowsheets  Taken 3/26/2025 1002 by Rosalina Connors RN  Skin Integrity Remains Intact: Monitor for areas of redness and/or skin breakdown  Taken 3/25/2025 2210 by Ana Carmona RN  Skin Integrity Remains Intact: Monitor for areas of redness and/or skin breakdown  Taken 3/25/2025 2030 by Ana Carmona RN  Skin Integrity Remains Intact: Monitor for areas of redness and/or skin breakdown     Problem: Safety - Adult  Goal: Free from fall injury  Outcome: Progressing  Flowsheets  Taken 3/26/2025 1006 by Rosalina Connors RN  Free From Fall Injury: Instruct family/caregiver on patient safety  Taken 3/25/2025 2210 by Ana Carmona RN  Free From Fall Injury: Instruct family/caregiver on patient safety     Problem: ABCDS Injury Assessment  Goal: Absence of physical injury  Outcome: Progressing  Flowsheets  Taken 3/26/2025 1006 by Rosalina Connors RN  Absence of Physical Injury: Implement safety measures based on patient assessment  Taken 3/25/2025 2210 by Ana Carmona RN  Absence of Physical Injury: Implement safety measures based on

## 2025-03-26 NOTE — DISCHARGE SUMMARY
Discharge Summary    Name: Lori Lambert  [id number]  YOB: 1932 (Age: 89 y.o.)   Date of Admission: 3/20/2025  Date of Discharge: 3/26/2025  Attending Physician: Sony Rincon MD    Discharge Diagnosis:     Consultations:  IP CONSULT TO PULMONOLOGY  IP CONSULT TO CASE MANAGEMENT  IP CONSULT TO GI  IP CONSULT TO CARDIOLOGY  IP CONSULT TO CARDIOLOGY  IP CONSULT TO PALLIATIVE CARE  IP CONSULT TO HOSPICE      Brief Admission History/Reason for Admission Per James Seipp, DO:     Chief Complaint / Reason for Physician Visit  \" Shortness of breath\".  Discussed with RN events overnight.      3/20 --   Admission H&P  89 y.o. female with a past medical history as noted below who presents for confusion.  Patient was last seen in the ED on 3/17/2025.  At that time she had been experiencing some URI symptoms for several weeks.  She had finished a Z-Nik course 1 week prior but was still feeling somewhat short of breath.  Patient has been refusing to take her Breztri citing that she does not like to walk to the bathroom afterward to gargle her mouth.  Chest x-ray was performed and showed no pneumonia.  Patient was discharged with levofloxacin 500 mg daily for 5 days.  Patient has been at her baseline mental status until this morning when the daughter and granddaughter noted she seemed confused so they brought her in for further evaluation.  Patient knows what month it is but not her age.  She is following commands but needs to be redirected.  She is pleasant and does not seem to be in any distress.  WBC 13.1 slightly down from 14.53 days prior.  BUN/creatinine 48/1.27 slightly up from 27/1.1.  Urinalysis is bland.        3/26  Patient sitting at bedside chair and being fed by daughter.  Continues to depend on baseline 3 L NC O2.  Daughter states patient has been on supplemental oxygen for at least 1 year.  No overnight fever/chills, chest pain, nausea/vomiting    Dysphagia  Inability to swallow  Gastroenterology consult appreciated, patient would like to avoid endoscopy at this time     Goals of care  Disposition planning-will follow-up with palliative care with regards to further assistance with regards to disposition planning as well as goals of care        Prophylaxis-SCDs  FEN-dysphagia diet replete potassium magnesium  DNR    Disposition  ending clinical improvement, culture results, PT OT evaluation, pulmonology, neurology and cardiology clearance clearance, Palliative eval anticipate 24-48 hours     18.5 - 24.9 Normal weight / Body mass index is 23.44 kg/m².     Code status: DNR/DNI      Discharge Exam:  Patient seen and examined by me on discharge day.  Pertinent Findings:  Patient Vitals for the past 24 hrs:   BP Temp Temp src Pulse Resp SpO2   03/26/25 0750 122/70 97.5 °F (36.4 °C) Axillary 69 18 99 %   03/26/25 0747 -- -- -- -- -- 100 %   03/26/25 0701 -- -- -- 76 -- --   03/26/25 0000 -- -- -- 60 -- --   03/25/25 2318 (!) 116/102 97.5 °F (36.4 °C) Oral 72 19 100 %   03/25/25 2154 -- -- -- 66 21 99 %   03/25/25 2136 -- -- -- 66 18 97 %   03/25/25 2002 (!) 144/46 97.2 °F (36.2 °C) Oral 67 16 100 %   03/25/25 1512 (!) 132/54 97.3 °F (36.3 °C) Oral 63 18 99 %   03/25/25 1505 -- -- -- 69 -- --       Gen:    Not in distress  Chest: Clear lungs  CVS:   Regular rhythm.  No edema  Abd:  Soft, not distended, not tender  Neuro: awake, moving all exts    Discharge/Recent Laboratory Results:  Recent Labs     03/25/25  0525 03/26/25  0658    140   K 3.9 4.2    107   CO2 31 32   BUN 14 12   CREATININE 1.01 0.85   GLUCOSE 87 92   CALCIUM 10.0 10.0   MG 2.0  --      Recent Labs     03/26/25  0658   HGB 8.2*   HCT 27.0*   WBC 9.6          Discharge Medications:     Medication List        START taking these medications      cefUROXime 500 MG tablet  Commonly known as: CEFTIN  Take 1 tablet by mouth 2 times daily for 7 days            CONTINUE taking these  Walter  [address] [id number] [phone]    Follow up in 3 week(s)        Total time in minutes spent coordinating this discharge (includes going over instructions, follow-up, prescriptions, and preparing report for sign off to her PCP) :  35 minutes

## 2025-03-26 NOTE — PROGRESS NOTES
PHYSICAL THERAPY REEVALUATION  Patient: Lori Lambert (89 y.o. female)  Date: 3/26/2025  Primary Diagnosis: Altered mental status, unspecified altered mental status type [R41.82]  Acute metabolic encephalopathy [G93.41]  Left upper lobe pulmonary nodule [R91.1]  Procedure(s) (LRB):  ESOPHAGOGASTRODUODENOSCOPY (N/A)     Precautions: Restrictions/Precautions  Restrictions/Precautions: Fall Risk, Bed Alarm  Recommendations for nursing mobility: Out of bed to chair for meals, AD and gt belt for bed to chair , Amb to bathroom with AD and gait belt, and Assist x1    In place during session: Peripheral IV, Nasal Cannula 3L, and EKG/telemetry   Chart, physical therapy assessment, plan of care and goals were reviewed.    ASSESSMENT  Patient initially seen for PT evaluation 3/21/25 and 2 skilled PT sessions since evalution. Patient seen today for PT reevaluation s/t LOS. Patient A&O x 4 with additional time. Pt seated in bedside recliner upon arrival, agreeable to session.      Based on the objective data described, the patient currently presents with impaired functional mobility, decreased independence in ADLs, impaired strength, decreased activity tolerance, impaired balance, and impaired posture. (See below for objective details and assist levels).    Overall pt tolerated session fair today, currently with no c/o pain throughout session. Pt completed sit<> stand transfers with bedside recliner with CGA and additional time, verbal cueing for hand placement and safety. Pt amb in room ~ 60 feet, RW, gt belt, and CGA; demonstrates NBOS with steady, step through gt pattern with no LOB or knee buckling noted, though required increased time with directional changes. Pt will benefit from continued skilled PT to address above deficits and return to PLOF, PT goals and POC reviewed on this date and continue to remain appropriate at this time. Current PT DC recommendation Intermittent physical therapy up to 2-3x/week in previous  referral.  Yancy Romero, PT, DPT  Minutes: 39

## 2025-03-26 NOTE — PLAN OF CARE
OCCUPATIONAL THERAPY TREATMENT  Patient: Lori Lambert (89 y.o. female)  Date: 3/26/2025  Primary Diagnosis: Altered mental status, unspecified altered mental status type [R41.82]  Acute metabolic encephalopathy [G93.41]  Left upper lobe pulmonary nodule [R91.1]  Procedure(s) (LRB):  ESOPHAGOGASTRODUODENOSCOPY (N/A)     Precautions: Fall Risk, Bed Alarm                Recommendations for nursing mobility: Out of bed to chair for meals, Encourage HEP in prep for ADLs/mobility; see handout for details, AD and gt belt for bed to chair , Amb to bathroom with AD and gait belt, and Assist x1    In place during session: Nasal Cannula 3.5L  Chart, occupational therapy assessment, plan of care, and goals were reviewed.  ASSESSMENT  Patient continues with skilled OT services and is progressing towards goals. Pt seated in recliner upon ALVAREZ arrival, agreeable to session. Pt A&O x 4. Daughter present during session w/ permission from pt and appears very supportive of pt. Pt completed light grooming seated in recliner w/ set up and extra time.  Pt completed UE therex, see grid below for details, to maintain/ increase strength and endurance to aid in adl performance. (See below for objective details and assist levels). Education provided on energy conservation, safety awareness and HEP w/ pt and daughter verbalizing good understanding. Pt encouraged to complete HEP 2-3 times per day to aid in recovery.  Pt left seated in recliner w/ PT with all known needs met.      Overall pt tolerated session fair today with rest breaks.Pt limited by decreased generalized strength and limited activity tolerance.  Pt had several periods of SOB however O 2 stats remained in anoop 90's. Reviewed PLB w/ pt and SOB decreased.  Current OT recommendations for discharge Moderate intensity short-term skilled occupational therapy up to 5x/week. Will continue to benefit from skilled OT services, and will continue to progress as tolerated.      Start

## 2025-03-27 VITALS
TEMPERATURE: 97.5 F | OXYGEN SATURATION: 100 % | DIASTOLIC BLOOD PRESSURE: 44 MMHG | SYSTOLIC BLOOD PRESSURE: 116 MMHG | RESPIRATION RATE: 15 BRPM | BODY MASS INDEX: 23.41 KG/M2 | HEIGHT: 61 IN | HEART RATE: 64 BPM | WEIGHT: 124 LBS

## 2025-03-27 LAB
HCT VFR BLD AUTO: 25.8 % (ref 35–47)
HGB BLD-MCNC: 7.9 G/DL (ref 11.5–16)

## 2025-03-27 PROCEDURE — 2700000000 HC OXYGEN THERAPY PER DAY

## 2025-03-27 PROCEDURE — 2500000003 HC RX 250 WO HCPCS: Performed by: INTERNAL MEDICINE

## 2025-03-27 PROCEDURE — 94640 AIRWAY INHALATION TREATMENT: CPT

## 2025-03-27 PROCEDURE — 6370000000 HC RX 637 (ALT 250 FOR IP): Performed by: INTERNAL MEDICINE

## 2025-03-27 PROCEDURE — 36415 COLL VENOUS BLD VENIPUNCTURE: CPT

## 2025-03-27 PROCEDURE — 85014 HEMATOCRIT: CPT

## 2025-03-27 PROCEDURE — 94761 N-INVAS EAR/PLS OXIMETRY MLT: CPT

## 2025-03-27 PROCEDURE — 85018 HEMOGLOBIN: CPT

## 2025-03-27 PROCEDURE — 6360000002 HC RX W HCPCS: Performed by: INTERNAL MEDICINE

## 2025-03-27 RX ADMIN — SODIUM CHLORIDE, PRESERVATIVE FREE 10 ML: 5 INJECTION INTRAVENOUS at 09:48

## 2025-03-27 RX ADMIN — PANTOPRAZOLE SODIUM 40 MG: 40 TABLET, DELAYED RELEASE ORAL at 09:47

## 2025-03-27 RX ADMIN — Medication 5 ML: at 11:54

## 2025-03-27 RX ADMIN — IPRATROPIUM BROMIDE AND ALBUTEROL SULFATE 1 DOSE: 2.5; .5 SOLUTION RESPIRATORY (INHALATION) at 08:37

## 2025-03-27 RX ADMIN — ACETAMINOPHEN 650 MG: 325 TABLET ORAL at 00:29

## 2025-03-27 RX ADMIN — ACETYLCYSTEINE 600 MG: 200 SOLUTION ORAL; RESPIRATORY (INHALATION) at 08:37

## 2025-03-27 RX ADMIN — BUSPIRONE HYDROCHLORIDE 5 MG: 5 TABLET ORAL at 00:29

## 2025-03-27 RX ADMIN — ACETAMINOPHEN 650 MG: 325 TABLET ORAL at 09:47

## 2025-03-27 RX ADMIN — VERAPAMIL HYDROCHLORIDE 120 MG: 120 TABLET ORAL at 09:47

## 2025-03-27 ASSESSMENT — PAIN DESCRIPTION - DESCRIPTORS
DESCRIPTORS: ACHING;DISCOMFORT;SORE
DESCRIPTORS: ACHING;DISCOMFORT

## 2025-03-27 ASSESSMENT — PAIN - FUNCTIONAL ASSESSMENT
PAIN_FUNCTIONAL_ASSESSMENT: PREVENTS OR INTERFERES SOME ACTIVE ACTIVITIES AND ADLS
PAIN_FUNCTIONAL_ASSESSMENT: ACTIVITIES ARE NOT PREVENTED
PAIN_FUNCTIONAL_ASSESSMENT: ACTIVITIES ARE NOT PREVENTED

## 2025-03-27 ASSESSMENT — PAIN DESCRIPTION - ORIENTATION
ORIENTATION: RIGHT
ORIENTATION: RIGHT

## 2025-03-27 ASSESSMENT — PAIN SCALES - GENERAL
PAINLEVEL_OUTOF10: 4
PAINLEVEL_OUTOF10: 7
PAINLEVEL_OUTOF10: 7

## 2025-03-27 ASSESSMENT — PAIN DESCRIPTION - LOCATION
LOCATION: MOUTH
LOCATION: RIB CAGE
LOCATION: RIB CAGE

## 2025-03-27 NOTE — PROGRESS NOTES
IMPRESSION:   Chronic hypoxic respiratory failure  J44.9 Chronic obstructive pulmonary disease, unspecified   I10 Essential (primary) hypertension   M54.9 Dorsalgia, unspecified   J96.10 Chronic respiratory failure, unspecified whether with hypoxia or hypercapnia   K21.9 Gastro-esophageal reflux disease without esophagitis   J18.9 Pneumonia, unspecified organism  Left upper lobe lung nodule rule out pneumonia versus mass      RECOMMENDATIONS/PLAN:     50-year-old lady followed with me regularly regarding her COPD status came in because of shortness of breath altered mental status confusion she was brought to the emergency room and discharged treated for pneumonia with Levaquin but her condition got worse she got more confused unable to recognize anyone came back to the hospital she was treated for pneumonia left upper lobe previously last year in late November and CAT scan shows left upper lobe infiltrate she did not have any lung mass now she was more confused family brought her to the hospital she does recognize me and she is getting better but now she is on oxygen she is chronically on home oxygen 2 L nasal cannula and also she is on Inogen portable oxygen and she was supposed to come and visit me to repeat chest x-ray now the CAT scan shows left upper lobe density nodule probably infection and also Deprizine the trachea most likely mucous now she is on 4 L nasal cannula  CAT scan of the chest done on this admission shows left upper lobe nodular density and also has left lower lobe 9 mm lung nodule right apical infiltrate groundglass opacity and COPD changes  At home she is Using her inhalers regularly and also nebulizer treatment despite of that is complaining of cough congestion finished rounds of antibiotic and steroids still continues to have cough  She is having upper respiratory symptoms cough congestion shortness of breath wheezing continue to use the nebulizer .  Discussed with the patient and the  for input(s): \"PHART\", \"VLI1OQE\", \"PO2ART\", \"SCX3BZE\", \"BEART\", \"CDL0JUBH\", \"HGBART\", \"PE0LHHEWZ\", \"FIO2A\", \"B5IIDTEZ\", \"OXYHEM\", \"CARBOXHGBART\", \"METHGBART\", \"X1TZXPEUI\", \"PHCORART\", \"TEMP\" in the last 72 hours.    Invalid input(s): \"KQS2RDXYG\"  Labs:    Recent Labs     03/25/25  0525 03/25/25  0919 03/26/25  0658 03/27/25  0617   WBC 10.8  --  9.6  --    HGB 8.9* 8.9* 8.2* 7.9*     --  274  --      Recent Labs     03/25/25  0525 03/26/25  0658    140   K 3.9 4.2    107   CO2 31 32   GLUCOSE 87 92   BUN 14 12   CREATININE 1.01 0.85   CALCIUM 10.0 10.0   MG 2.0  --    BILITOT 0.4 0.4   AST 17 17   ALT 11* 9*     No results for input(s): \"CKTOTAL\", \"CKMB\", \"CKMBINDEX\", \"TROPONINI\" in the last 72 hours.  Lab Results   Component Value Date/Time    PROBNP 534 10/09/2023 04:32 PM      Lab Results   Component Value Date/Time    TSH 1.57 03/22/2025 11:13 AM      Results       Procedure Component Value Units Date/Time    COVID-19 & Influenza Combo [[id number]] Collected: 03/22/25 1230    Order Status: Completed Specimen: Nasopharyngeal Updated: 03/22/25 1339     SARS-CoV-2, PCR Not Detected        Comment: Not Detected results do not preclude SARS-CoV-2 infection and should not be used as the sole basis for patient management decisions. Results must be combined with clinical observations, patient history, and epidemiological information        Rapid Influenza A By PCR Not Detected        Rapid Influenza B By PCR Not Detected        Comment:    Testing was performed using guicho Rhonda SARS-CoV-2 and Influenza A/B nucleic acid assay.  This test is a multiplex Real-Time Reverse Transcriptas         Culture, Respiratory [[id number]] Collected: 03/21/25 1100    Order Status: Canceled Specimen: Sputum Expectorated     Urine Culture [[id number]] Collected: 03/20/25 1739    Order Status: Completed Specimen: Urine Updated: 03/22/25 1100     Special Requests No Special Requests        Culture No Growth (<1000 cfu/mL)               Imaging:  CT HEAD WO CONTRAST   Final Result   No evidence of acute intracranial abnormality.            Electronically signed by DINH BROWN MODIFIED BARIUM SWALLOW W VIDEO   Final Result   Modified barium swallow as above.  Please see the separately   dictated Speech Pathology report for additional details and recommendations.      Electronically signed by VIELKA MEDEIROS      CT CHEST WO CONTRAST   Final Result   Pulmonary findings concerning for malignancy. Superimposed   infectious/inflammatory process cannot be excluded. Clinical and laboratory   correlation along with appropriate follow-up and management as clinically   indicated.      Tracheal debris versus nodule.      Small pericardial effusion.      Prominent coronary artery calcifications with three-vessel disease.      Nodular hepatic contour can be seen in the setting of chronic liver disease.      Indeterminate right superior pole renal lesion.         Electronically signed by Jakub Claros      XR CHEST PORTABLE   Final Result      Left upper lobe nodular opacity. Dedicated chest CT recommended for further   characterization..         Electronically signed by RODRIGO WHITLOCK      CT HEAD WO CONTRAST   Final Result      No acute intracranial abnormality.            Electronically signed by Nelson Cabrera          CT CHEST WO CONTRAST  Result Date: 3/20/2025  EXAM: CT Chest without contrast INDICATION: eval for occult pna TECHNIQUE: Helical CT of the chest was obtained without intravenous contrast. Axial, coronal and sagittal images were created. Dose reduction technique was used including one or more of the following: automated exposure control, adjustment of mA and kV according to patient size, and/or iterative reconstruction. The absence of intravenous contrast reduces the sensitivity for evaluation of the mediastinum, davi, vasculature, and upper abdominal organs. COMPARISON: September 2024 FINDINGS: LUNGS: Left upper lobe nodule

## 2025-03-27 NOTE — DISCHARGE SUMMARY
Discharge Summary    Name: Lori Lambert  [id number]  YOB: 1932 (Age: 89 y.o.)   Date of Admission: 3/20/2025  Date of Discharge: 3/27/2025  Attending Physician: Ольга Ingram*    Discharge Diagnosis:     Consultations:  IP CONSULT TO PULMONOLOGY  IP CONSULT TO CASE MANAGEMENT  IP CONSULT TO GI  IP CONSULT TO CARDIOLOGY  IP CONSULT TO CARDIOLOGY  IP CONSULT TO PALLIATIVE CARE  IP CONSULT TO HOSPICE      Brief Admission History/Reason for Admission Per James Seipp, DO:     Chief Complaint / Reason for Physician Visit  \" Shortness of breath\".  Discussed with RN events overnight.      3/20 --   Admission H&P  89 y.o. female with a past medical history as noted below who presents for confusion.  Patient was last seen in the ED on 3/17/2025.  At that time she had been experiencing some URI symptoms for several weeks.  She had finished a Z-Nik course 1 week prior but was still feeling somewhat short of breath.  Patient has been refusing to take her Breztri citing that she does not like to walk to the bathroom afterward to gargle her mouth.  Chest x-ray was performed and showed no pneumonia.  Patient was discharged with levofloxacin 500 mg daily for 5 days.  Patient has been at her baseline mental status until this morning when the daughter and granddaughter noted she seemed confused so they brought her in for further evaluation.  Patient knows what month it is but not her age.  She is following commands but needs to be redirected.  She is pleasant and does not seem to be in any distress.  WBC 13.1 slightly down from 14.53 days prior.  BUN/creatinine 48/1.27 slightly up from 27/1.1.  Urinalysis is bland.        3/26  Patient sitting at bedside chair and being fed by daughter.  Continues to depend on baseline 3 L NC O2.  Daughter states patient has been on supplemental oxygen for at least 1 year.  No overnight fever/chills, chest pain, nausea/vomiting  remains at baseline  Continue to trend serum creatinine     Anemia   status post 1 unit packed RBC transfusion yesterday  Currently hemoglobin 8.9, remains hemodynamically stable  Continue trend hemoglobin hematocrit     Dysphagia  Inability to swallow  Gastroenterology consult appreciated, patient would like to avoid endoscopy at this time     Goals of care  Disposition planning-will follow-up with palliative care with regards to further assistance with regards to disposition planning as well as goals of care        Prophylaxis-SCDs  FEN-dysphagia diet replete potassium magnesium  DNR    Disposition  ending clinical improvement, culture results, PT OT evaluation, pulmonology, neurology and cardiology clearance clearance, Palliative eval anticipate 24-48 hours     18.5 - 24.9 Normal weight / Body mass index is 23.44 kg/m².     Code status: DNR/DNI      Discharge Exam:  Patient seen and examined by me on discharge day.  Pertinent Findings:  Patient Vitals for the past 24 hrs:   BP Temp Temp src Pulse Resp SpO2   03/27/25 1102 (!) 116/44 97.5 °F (36.4 °C) Axillary 64 15 100 %   03/27/25 0926 (!) 173/70 97.5 °F (36.4 °C) -- 72 -- 99 %   03/27/25 0839 -- -- -- -- -- 100 %   03/26/25 2319 (!) 154/42 98.4 °F (36.9 °C) Axillary 71 18 100 %   03/26/25 1955 (!) 145/53 98.6 °F (37 °C) Oral 70 16 100 %   03/26/25 1531 (!) 130/54 97.5 °F (36.4 °C) Axillary 70 18 99 %       Gen:    Not in distress  Chest: Clear lungs  CVS:   Regular rhythm.  No edema  Abd:  Soft, not distended, not tender  Neuro: awake, moving all exts    Discharge/Recent Laboratory Results:  Recent Labs     03/25/25  0525 03/26/25  0658    140   K 3.9 4.2    107   CO2 31 32   BUN 14 12   CREATININE 1.01 0.85   GLUCOSE 87 92   CALCIUM 10.0 10.0   MG 2.0  --      Recent Labs     03/26/25  0658 03/27/25  0617   HGB 8.2* 7.9*   HCT 27.0* 25.8*   WBC 9.6  --      --        Discharge Medications:     Medication List        START taking these  medications      cefUROXime 500 MG tablet  Commonly known as: CEFTIN  Take 1 tablet by mouth 2 times daily for 7 days            CONTINUE taking these medications      albuterol sulfate  (90 Base) MCG/ACT inhaler  Commonly known as: PROVENTIL;VENTOLIN;PROAIR  Inhale 2 puffs into the lungs as needed for Wheezing     Breztri Aerosphere 160-9-4.8 MCG/ACT Aero  Generic drug: Budeson-Glycopyrrol-Formoterol     busPIRone 5 MG tablet  Commonly known as: BUSPAR  Take 1 tablet by mouth daily as needed (anxiety)     clotrimazole-betamethasone 1-0.05 % cream  Commonly known as: LOTRISONE  Apply 1 each topically as needed (itching)     omeprazole 20 MG delayed release capsule  Commonly known as: PRILOSEC  Take 1 capsule by mouth daily     polyethylene glycol 17 GM/SCOOP powder  Commonly known as: GLYCOLAX     simvastatin 10 MG tablet  Commonly known as: ZOCOR  TAKE 1 TABLET BY MOUTH AT BEDTIME     TURMERIC PO     verapamil 120 MG tablet  Commonly known as: CALAN  TAKE 1 TABLET BY MOUTH TWICE DAILY FOR HIGH BLOOD PRESSURE            STOP taking these medications      aspirin 81 MG EC tablet     azithromycin 250 MG tablet  Commonly known as: Zithromax Z-Nik     ipratropium 0.5 mg-albuterol 2.5 mg 0.5-2.5 (3) MG/3ML Soln nebulizer solution  Commonly known as: DUONEB     levoFLOXacin 500 MG tablet  Commonly known as: LEVAQUIN               Where to Get Your Medications        These medications were sent to [id number] [address] [facility] P [phone] - F [phone] [address] [facility] [address]      Phone: [phone]   cefUROXime 500 MG tablet           DISPOSITION:    Home with Family:    xx   Home with HH/PT/OT/RN:    SNF/LTC:    IVÁN:    OTHER: Hospice       Code status:   DNR/DNI  Recommended diet:  Dysphagia diet with soft and bite-size with full aspiration precautions  Recommended activity: activity as tolerated      Follow up with:   PCP : Tiki Coronado  Tiki Vega MD  [address] [facility] [address] [phone]    Follow up in 2 week(s)      Josiah Martin MD  [address] [address] [phone]    Follow up in 3 week(s)        Total time in minutes spent coordinating this discharge (includes going over instructions, follow-up, prescriptions, and preparing report for sign off to her PCP) :  35 minutes

## 2025-03-27 NOTE — PROGRESS NOTES
Pt discharged home w/ hospice via pts daughter to transport. Pts IV removed. This RN educated pt and family and answered any questions and concerns. Pt and family verbalize understanding.

## 2025-03-27 NOTE — PLAN OF CARE
Problem: Discharge Planning  Goal: Discharge to home or other facility with appropriate resources  3/27/2025 1049 by Eden Casillas RN  Outcome: Adequate for Discharge  3/27/2025 1047 by Eden Casillas RN  Outcome: Progressing  Flowsheets (Taken 3/27/2025 1047)  Discharge to home or other facility with appropriate resources:   Identify barriers to discharge with patient and caregiver   Arrange for needed discharge resources and transportation as appropriate     Problem: Skin/Tissue Integrity  Goal: Skin integrity remains intact  Description: 1.  Monitor for areas of redness and/or skin breakdown  2.  Assess vascular access sites hourly  3.  Every 4-6 hours minimum:  Change oxygen saturation probe site  4.  Every 4-6 hours:  If on nasal continuous positive airway pressure, respiratory therapy assess nares and determine need for appliance change or resting period  3/27/2025 1049 by Eden Caisllas RN  Outcome: Adequate for Discharge  3/27/2025 1047 by Eden Casillas RN  Outcome: Progressing  Flowsheets  Taken 3/27/2025 1047 by Eden Casillas RN  Skin Integrity Remains Intact:   Assess vascular access sites hourly   Monitor for areas of redness and/or skin breakdown  Taken 3/26/2025 2123 by Ana Carmona RN  Skin Integrity Remains Intact: Monitor for areas of redness and/or skin breakdown     Problem: Safety - Adult  Goal: Free from fall injury  3/27/2025 1049 by Eden Casillas RN  Outcome: Adequate for Discharge  3/27/2025 1047 by Eden Casillas RN  Outcome: Progressing  Flowsheets  Taken 3/27/2025 1047 by Eden Casillas RN  Free From Fall Injury:   Instruct family/caregiver on patient safety   Based on caregiver fall risk screen, instruct family/caregiver to ask for assistance with transferring infant if caregiver noted to have fall risk factors  Taken 3/26/2025 2123 by Ana Carmona RN  Free From Fall Injury: Instruct family/caregiver on patient safety     Problem: ABCDS Injury Assessment  Goal: Absence

## 2025-03-27 NOTE — CARE COORDINATION
CM noted discharge order. Patient going home with [facility]. Family agreeable with DCP. Daugther to transport.    Transition of Care Plan:    RUR: 16%  Prior Level of Functioning: Needs Assistance  Disposition: Home with hospice  PEDRITO: today  If SNF or IPR: Date FOC offered: n/a  Date FOC received: n/a  Accepting facility: n/a  Date authorization started with reference number: n/a  Date authorization received and expires: n/a  Follow up appointments: Per MD  DME needed: n/a  Transportation at discharge: family  IM/IMM Medicare/ letter given: hospice  Is patient a [place] and connected with [facility]? N/a   If yes, was  transfer form completed and [facility] notified?   Caregiver Contact: Aleida  Discharge Caregiver contacted prior to discharge? yes  Care Conference needed? no  Barriers to discharge: none

## 2025-03-27 NOTE — PROGRESS NOTES
During session yesterday PT discussed use of gt belt at home for transfers as needed with daughter, daughter agreeable to use. Gt belt obtained and issued to pt and pt daughter today prior to DC today. Reviewed use and adjustments. All questions addressed and answered regarding use. Thank you.

## 2025-03-27 NOTE — PLAN OF CARE
Problem: Discharge Planning  Goal: Discharge to home or other facility with appropriate resources  Outcome: Progressing  Flowsheets (Taken 3/27/2025 1047)  Discharge to home or other facility with appropriate resources:   Identify barriers to discharge with patient and caregiver   Arrange for needed discharge resources and transportation as appropriate     Problem: Skin/Tissue Integrity  Goal: Skin integrity remains intact  Description: 1.  Monitor for areas of redness and/or skin breakdown  2.  Assess vascular access sites hourly  3.  Every 4-6 hours minimum:  Change oxygen saturation probe site  4.  Every 4-6 hours:  If on nasal continuous positive airway pressure, respiratory therapy assess nares and determine need for appliance change or resting period  Outcome: Progressing  Flowsheets  Taken 3/27/2025 1047 by Eden Casillas RN  Skin Integrity Remains Intact:   Assess vascular access sites hourly   Monitor for areas of redness and/or skin breakdown  Taken 3/26/2025 2123 by Ana Carmona RN  Skin Integrity Remains Intact: Monitor for areas of redness and/or skin breakdown     Problem: Safety - Adult  Goal: Free from fall injury  Outcome: Progressing  Flowsheets  Taken 3/27/2025 1047 by Eden Casillas RN  Free From Fall Injury:   Instruct family/caregiver on patient safety   Based on caregiver fall risk screen, instruct family/caregiver to ask for assistance with transferring infant if caregiver noted to have fall risk factors  Taken 3/26/2025 2123 by Ana Carmona RN  Free From Fall Injury: Instruct family/caregiver on patient safety     Problem: ABCDS Injury Assessment  Goal: Absence of physical injury  Outcome: Progressing  Flowsheets  Taken 3/27/2025 1047 by Eden Casillas RN  Absence of Physical Injury: Implement safety measures based on patient assessment  Taken 3/26/2025 2123 by Ana Carmona RN  Absence of Physical Injury: Implement safety measures based on patient assessment     Problem:  Nutrition Deficit:  Goal: Optimize nutritional status  Outcome: Progressing  Flowsheets (Taken 3/27/2025 1047)  Nutrient intake appropriate for improving, restoring, or maintaining nutritional needs:   Monitor oral intake, labs, and treatment plans   Assess nutritional status and recommend course of action     Problem: Pain  Goal: Verbalizes/displays adequate comfort level or baseline comfort level  Outcome: Progressing  Flowsheets (Taken 3/27/2025 1047)  Verbalizes/displays adequate comfort level or baseline comfort level:   Encourage patient to monitor pain and request assistance   Assess pain using appropriate pain scale   Implement non-pharmacological measures as appropriate and evaluate response   Administer analgesics based on type and severity of pain and evaluate response

## 2025-04-17 NOTE — PROGRESS NOTES
Physician Progress Note      PATIENT:               DENNY URIBE  CSN #:                  [id number]  :                       1932  ADMIT DATE:       3/20/2025 12:11 PM  DISCH DATE:        3/27/2025 11:59 AM  RESPONDING  PROVIDER #:        Ольга Ingram MD          QUERY TEXT:    Please clarify in documentation the etiology of Metabolic Encephalopathy:    The clinical indicators include:  Patient clinical indicators include:    -AMS/confusion  -Aspiration PNA  -Per DS 3/26 \"Initially with metabolic encephalopathy associated with   aphasia.\" \"Secondary to acute illness and bilateral aspiration pneumonia.\" by   Dr. Rincon  Options provided:  -- Metabolic encephalopathy related to Aspiration PNA  -- Metabolic encephalopathy related to other cause, Please specify other cause  -- Other - I will add my own diagnosis  -- Disagree - Not applicable / Not valid  -- Disagree - Clinically unable to determine / Unknown  -- Refer to Clinical Documentation Reviewer    PROVIDER RESPONSE TEXT:    This patient has Metabolic encephalopathy related to Aspiration PNA    Query created by: Maggie Haq on 2025 6:44 AM      Electronically signed by:  Ольга Ingram MD 2025 7:16 AM